# Patient Record
Sex: MALE | URBAN - NONMETROPOLITAN AREA
[De-identification: names, ages, dates, MRNs, and addresses within clinical notes are randomized per-mention and may not be internally consistent; named-entity substitution may affect disease eponyms.]

---

## 2017-05-03 ENCOUNTER — DOCTOR'S OFFICE (OUTPATIENT)
Dept: URBAN - NONMETROPOLITAN AREA CLINIC 1 | Facility: CLINIC | Age: 57
Setting detail: OPHTHALMOLOGY
End: 2017-05-03
Payer: COMMERCIAL

## 2017-05-03 DIAGNOSIS — H33.22: ICD-10-CM

## 2017-05-03 DIAGNOSIS — H27.8: ICD-10-CM

## 2017-05-03 DIAGNOSIS — D31.31: ICD-10-CM

## 2017-05-03 DIAGNOSIS — H33.8: ICD-10-CM

## 2017-05-03 DIAGNOSIS — T85.22XA: ICD-10-CM

## 2017-05-03 PROBLEM — H11.32 SUBCONJUNCTIVAL HEMORRHAGE: Status: RESOLVED | Noted: 2017-05-03 | Resolved: 2017-05-03

## 2017-05-03 PROCEDURE — 92014 COMPRE OPH EXAM EST PT 1/>: CPT | Performed by: OPHTHALMOLOGY

## 2017-05-03 PROCEDURE — 92226 OPHTHALMOSCOPY EXT SUBSEQUENT: CPT | Performed by: OPHTHALMOLOGY

## 2017-05-03 ASSESSMENT — VISUAL ACUITY
OS_BCVA: 20/CF @FACE
OD_BCVA: 20/40-2

## 2017-05-03 ASSESSMENT — REFRACTION_CURRENTRX
OD_OVR_VA: 20/
OS_OVR_VA: 20/
OD_OVR_VA: 20/
OD_OVR_VA: 20/
OS_OVR_VA: 20/
OS_OVR_VA: 20/

## 2017-05-03 ASSESSMENT — REFRACTION_MANIFEST
OU_VA: 20/
OS_VA2: 20/
OD_VA1: 20/
OD_VA2: 20/
OD_VA1: 20/
OU_VA: 20/
OS_VA3: 20/
OS_VA2: 20/
OD_VA2: 20/
OD_VA3: 20/
OS_VA1: 20/
OS_VA3: 20/
OS_VA2: 20/
OD_VA2: 20/
OU_VA: 20/
OS_VA3: 20/
OD_VA3: 20/
OD_VA3: 20/
OS_VA1: 20/
OD_VA1: 20/
OS_VA1: 20/

## 2017-05-03 ASSESSMENT — CORNEAL EDEMA CLINICAL DESCRIPTION: OD_CORNEALEDEMA: T

## 2017-05-03 ASSESSMENT — CONFRONTATIONAL VISUAL FIELD TEST (CVF)
OS_FINDINGS: FULL
OD_FINDINGS: FULL

## 2017-05-09 ENCOUNTER — RX ONLY (RX ONLY)
Age: 57
End: 2017-05-09

## 2017-05-09 ENCOUNTER — DOCTOR'S OFFICE (OUTPATIENT)
Dept: URBAN - NONMETROPOLITAN AREA CLINIC 1 | Facility: CLINIC | Age: 57
Setting detail: OPHTHALMOLOGY
End: 2017-05-09
Payer: COMMERCIAL

## 2017-05-09 DIAGNOSIS — H27.8: ICD-10-CM

## 2017-05-09 DIAGNOSIS — T85.22XD: ICD-10-CM

## 2017-05-09 DIAGNOSIS — H43.811: ICD-10-CM

## 2017-05-09 DIAGNOSIS — H33.8: ICD-10-CM

## 2017-05-09 DIAGNOSIS — D31.31: ICD-10-CM

## 2017-05-09 PROCEDURE — 92226 OPHTHALMOSCOPY EXT SUBSEQUENT: CPT | Performed by: OPHTHALMOLOGY

## 2017-05-09 PROCEDURE — 92014 COMPRE OPH EXAM EST PT 1/>: CPT | Performed by: OPHTHALMOLOGY

## 2017-05-09 ASSESSMENT — REFRACTION_MANIFEST
OS_VA2: 20/
OS_VA1: 20/
OS_VA1: 20/
OD_VA1: 20/
OU_VA: 20/
OD_VA2: 20/
OS_VA2: 20/
OS_VA3: 20/
OD_VA3: 20/
OD_VA2: 20/
OS_VA3: 20/
OD_VA1: 20/
OD_VA1: 20/
OU_VA: 20/
OS_VA2: 20/
OD_VA2: 20/
OS_VA3: 20/
OD_VA3: 20/
OD_VA3: 20/
OS_VA1: 20/
OU_VA: 20/

## 2017-05-09 ASSESSMENT — REFRACTION_CURRENTRX
OS_OVR_VA: 20/
OD_OVR_VA: 20/

## 2017-05-09 ASSESSMENT — CONFRONTATIONAL VISUAL FIELD TEST (CVF)
OD_FINDINGS: FULL
OS_FINDINGS: FULL

## 2017-05-09 ASSESSMENT — VISUAL ACUITY: OD_BCVA: 20/40-2

## 2017-05-09 ASSESSMENT — CORNEAL EDEMA CLINICAL DESCRIPTION: OD_CORNEALEDEMA: T

## 2017-05-10 ENCOUNTER — TRANSCRIBE ORDERS (OUTPATIENT)
Dept: ADMINISTRATIVE | Facility: HOSPITAL | Age: 57
End: 2017-05-10

## 2017-05-10 ENCOUNTER — LAB (OUTPATIENT)
Dept: LAB | Facility: HOSPITAL | Age: 57
End: 2017-05-10
Payer: COMMERCIAL

## 2017-05-10 DIAGNOSIS — I10 ESSENTIAL HYPERTENSION, MALIGNANT: Primary | ICD-10-CM

## 2017-05-10 DIAGNOSIS — I10 ESSENTIAL HYPERTENSION, MALIGNANT: ICD-10-CM

## 2017-05-10 PROCEDURE — 93005 ELECTROCARDIOGRAM TRACING: CPT

## 2017-05-11 ENCOUNTER — OUTPATIENT HOSPITAL (OUTPATIENT)
Dept: URBAN - METROPOLITAN AREA CLINIC 130 | Facility: CLINIC | Age: 57
Setting detail: OPHTHALMOLOGY
End: 2017-05-11
Payer: COMMERCIAL

## 2017-05-11 DIAGNOSIS — H33.8: ICD-10-CM

## 2017-05-11 DIAGNOSIS — H43.811: ICD-10-CM

## 2017-05-11 DIAGNOSIS — T85.22XD: ICD-10-CM

## 2017-05-11 LAB
ATRIAL RATE: 105 BPM
P AXIS: 60 DEGREES
PR INTERVAL: 138 MS
QRS AXIS: 49 DEGREES
QRSD INTERVAL: 84 MS
QT INTERVAL: 346 MS
QTC INTERVAL: 457 MS
T WAVE AXIS: 69 DEGREES
VENTRICULAR RATE: 105 BPM

## 2017-05-11 PROCEDURE — 67108 REPAIR DETACHED RETINA: CPT | Performed by: OPHTHALMOLOGY

## 2017-05-11 PROCEDURE — 66825 REPOSITION INTRAOCULAR LENS: CPT | Performed by: OPHTHALMOLOGY

## 2017-05-12 ENCOUNTER — RX ONLY (RX ONLY)
Age: 57
End: 2017-05-12

## 2017-05-12 ENCOUNTER — DOCTOR'S OFFICE (OUTPATIENT)
Dept: URBAN - NONMETROPOLITAN AREA CLINIC 1 | Facility: CLINIC | Age: 57
Setting detail: OPHTHALMOLOGY
End: 2017-05-12
Payer: COMMERCIAL

## 2017-05-12 DIAGNOSIS — T85.22XD: ICD-10-CM

## 2017-05-12 PROCEDURE — 99024 POSTOP FOLLOW-UP VISIT: CPT | Performed by: OPTOMETRIST

## 2017-05-12 ASSESSMENT — VISUAL ACUITY
OS_BCVA: CF 4FT
OD_BCVA: 20/40-2

## 2017-05-12 ASSESSMENT — CORNEAL EDEMA CLINICAL DESCRIPTION: OD_CORNEALEDEMA: 2+

## 2017-05-12 ASSESSMENT — REFRACTION_CURRENTRX
OD_OVR_VA: 20/
OD_OVR_VA: 20/
OS_OVR_VA: 20/
OS_OVR_VA: 20/
OD_OVR_VA: 20/
OS_OVR_VA: 20/

## 2017-05-12 ASSESSMENT — REFRACTION_MANIFEST
OS_VA2: 20/
OU_VA: 20/
OS_VA2: 20/
OS_VA3: 20/
OS_VA1: 20/
OU_VA: 20/
OD_VA2: 20/
OS_VA3: 20/
OD_VA3: 20/
OD_VA3: 20/
OD_VA1: 20/
OS_VA1: 20/
OS_VA1: 20/
OD_VA2: 20/
OS_VA3: 20/
OD_VA3: 20/
OU_VA: 20/
OD_VA1: 20/
OD_VA1: 20/
OD_VA2: 20/
OS_VA2: 20/

## 2017-05-12 ASSESSMENT — VASCULARIZATION: OD_VASCULARIZATION: INFERIOR

## 2017-05-23 ENCOUNTER — DOCTOR'S OFFICE (OUTPATIENT)
Dept: URBAN - NONMETROPOLITAN AREA CLINIC 1 | Facility: CLINIC | Age: 57
Setting detail: OPHTHALMOLOGY
End: 2017-05-23
Payer: COMMERCIAL

## 2017-05-23 DIAGNOSIS — T85.22XS: ICD-10-CM

## 2017-05-23 DIAGNOSIS — H33.001: ICD-10-CM

## 2017-05-23 PROCEDURE — 99024 POSTOP FOLLOW-UP VISIT: CPT | Performed by: OPHTHALMOLOGY

## 2017-05-23 PROCEDURE — 92226 OPHTHALMOSCOPY EXT SUBSEQUENT: CPT | Performed by: OPHTHALMOLOGY

## 2017-05-23 ASSESSMENT — REFRACTION_MANIFEST
OD_VA1: 20/
OD_VA1: 20/
OD_VA2: 20/
OD_VA2: 20/
OD_VA3: 20/
OS_VA1: 20/
OS_VA2: 20/
OU_VA: 20/
OS_VA3: 20/
OD_VA3: 20/
OU_VA: 20/
OS_VA2: 20/
OS_VA1: 20/
OS_VA2: 20/
OD_VA3: 20/
OD_VA1: 20/
OS_VA3: 20/
OD_VA2: 20/
OS_VA3: 20/
OU_VA: 20/
OS_VA1: 20/

## 2017-05-23 ASSESSMENT — REFRACTION_CURRENTRX
OD_OVR_VA: 20/
OS_OVR_VA: 20/
OS_OVR_VA: 20/
OD_OVR_VA: 20/
OD_OVR_VA: 20/
OS_OVR_VA: 20/

## 2017-05-23 ASSESSMENT — CORNEAL EDEMA CLINICAL DESCRIPTION: OD_CORNEALEDEMA: 2+

## 2017-05-23 ASSESSMENT — CONFRONTATIONAL VISUAL FIELD TEST (CVF)
OS_FINDINGS: FULL
OD_FINDINGS: FULL

## 2017-05-23 ASSESSMENT — VASCULARIZATION: OD_VASCULARIZATION: INFERIOR

## 2017-05-23 ASSESSMENT — VISUAL ACUITY
OD_BCVA: 20/40-2
OS_BCVA: CF 4FT

## 2017-06-14 ENCOUNTER — DOCTOR'S OFFICE (OUTPATIENT)
Dept: URBAN - NONMETROPOLITAN AREA CLINIC 1 | Facility: CLINIC | Age: 57
Setting detail: OPHTHALMOLOGY
End: 2017-06-14
Payer: COMMERCIAL

## 2017-06-14 DIAGNOSIS — T85.22XS: ICD-10-CM

## 2017-06-14 DIAGNOSIS — H33.001: ICD-10-CM

## 2017-06-14 PROCEDURE — 99024 POSTOP FOLLOW-UP VISIT: CPT | Performed by: OPHTHALMOLOGY

## 2017-06-14 ASSESSMENT — REFRACTION_MANIFEST
OU_VA: 20/
OS_VA2: 20/
OD_VA1: 20/
OU_VA: 20/
OD_VA2: 20/
OS_VA1: 20/
OS_VA2: 20/
OD_VA1: 20/
OS_VA2: 20/
OU_VA: 20/
OD_VA3: 20/
OS_VA1: 20/
OD_VA1: 20/
OD_VA2: 20/
OS_VA1: 20/
OD_VA3: 20/
OS_VA3: 20/
OD_VA3: 20/
OD_VA2: 20/
OS_VA3: 20/
OS_VA3: 20/

## 2017-06-14 ASSESSMENT — VISUAL ACUITY
OS_BCVA: 20/150-1
OD_BCVA: 20/25

## 2017-06-14 ASSESSMENT — REFRACTION_CURRENTRX
OS_OVR_VA: 20/
OS_OVR_VA: 20/
OD_OVR_VA: 20/
OD_OVR_VA: 20/
OS_OVR_VA: 20/
OD_OVR_VA: 20/

## 2017-06-14 ASSESSMENT — VASCULARIZATION: OD_VASCULARIZATION: INFERIOR

## 2017-06-14 ASSESSMENT — CONFRONTATIONAL VISUAL FIELD TEST (CVF)
OS_FINDINGS: FULL
OD_FINDINGS: FULL

## 2017-06-14 ASSESSMENT — CORNEAL EDEMA CLINICAL DESCRIPTION: OD_CORNEALEDEMA: 2+

## 2017-07-05 ENCOUNTER — DOCTOR'S OFFICE (OUTPATIENT)
Dept: URBAN - NONMETROPOLITAN AREA CLINIC 1 | Facility: CLINIC | Age: 57
Setting detail: OPHTHALMOLOGY
End: 2017-07-05
Payer: COMMERCIAL

## 2017-07-05 DIAGNOSIS — T85.22XS: ICD-10-CM

## 2017-07-05 DIAGNOSIS — H33.001: ICD-10-CM

## 2017-07-05 PROCEDURE — 99024 POSTOP FOLLOW-UP VISIT: CPT | Performed by: OPHTHALMOLOGY

## 2017-07-05 ASSESSMENT — CONFRONTATIONAL VISUAL FIELD TEST (CVF)
OS_FINDINGS: FULL
OD_FINDINGS: FULL

## 2017-07-05 ASSESSMENT — REFRACTION_CURRENTRX
OD_OVR_VA: 20/
OD_OVR_VA: 20/
OS_OVR_VA: 20/
OD_OVR_VA: 20/

## 2017-07-05 ASSESSMENT — REFRACTION_MANIFEST
OS_VA3: 20/
OS_VA3: 20/
OD_VA1: 20/
OD_VA3: 20/
OU_VA: 20/
OD_VA2: 20/
OS_VA2: 20/
OS_VA1: 20/
OD_VA2: 20/
OD_VA1: 20/
OS_VA2: 20/
OD_VA2: 20/
OS_VA2: 20/
OS_VA3: 20/
OS_VA1: 20/
OD_VA1: 20/
OD_VA3: 20/
OU_VA: 20/
OS_VA1: 20/
OD_VA3: 20/
OU_VA: 20/

## 2017-07-05 ASSESSMENT — CORNEAL EDEMA CLINICAL DESCRIPTION: OD_CORNEALEDEMA: ABSENT

## 2017-07-05 ASSESSMENT — VISUAL ACUITY
OD_BCVA: 20/30
OS_BCVA: 20/80-1

## 2017-07-05 ASSESSMENT — VASCULARIZATION: OD_VASCULARIZATION: INFERIOR

## 2017-07-12 ENCOUNTER — DOCTOR'S OFFICE (OUTPATIENT)
Dept: URBAN - NONMETROPOLITAN AREA CLINIC 1 | Facility: CLINIC | Age: 57
Setting detail: OPHTHALMOLOGY
End: 2017-07-12
Payer: COMMERCIAL

## 2017-07-12 DIAGNOSIS — H33.001: ICD-10-CM

## 2017-07-12 DIAGNOSIS — T85.22XS: ICD-10-CM

## 2017-07-12 DIAGNOSIS — Z96.1: ICD-10-CM

## 2017-07-12 DIAGNOSIS — D31.31: ICD-10-CM

## 2017-07-12 PROCEDURE — 99024 POSTOP FOLLOW-UP VISIT: CPT | Performed by: OPHTHALMOLOGY

## 2017-07-12 ASSESSMENT — REFRACTION_MANIFEST
OD_VA3: 20/
OD_VA1: 20/
OU_VA: 20/
OD_VA3: 20/
OD_VA2: 20/
OS_VA1: 20/
OS_VA3: 20/
OU_VA: 20/
OS_VA2: 20/
OS_VA1: 20/
OD_VA2: 20/
OS_VA3: 20/
OD_VA3: 20/
OS_VA2: 20/
OD_VA1: 20/
OS_VA1: 20/
OU_VA: 20/
OS_VA3: 20/
OD_VA2: 20/
OS_VA2: 20/
OD_VA1: 20/

## 2017-07-12 ASSESSMENT — REFRACTION_CURRENTRX
OD_OVR_VA: 20/
OS_OVR_VA: 20/

## 2017-07-12 ASSESSMENT — VISUAL ACUITY
OD_BCVA: 20/30
OS_BCVA: 20/80-1

## 2017-07-12 ASSESSMENT — CONFRONTATIONAL VISUAL FIELD TEST (CVF)
OS_FINDINGS: FULL
OD_FINDINGS: FULL

## 2017-07-12 ASSESSMENT — VASCULARIZATION: OD_VASCULARIZATION: INFERIOR

## 2017-07-12 ASSESSMENT — CORNEAL EDEMA CLINICAL DESCRIPTION: OD_CORNEALEDEMA: ABSENT

## 2018-05-18 ENCOUNTER — DOCTOR'S OFFICE (OUTPATIENT)
Dept: URBAN - NONMETROPOLITAN AREA CLINIC 1 | Facility: CLINIC | Age: 58
Setting detail: OPHTHALMOLOGY
End: 2018-05-18
Payer: COMMERCIAL

## 2018-05-18 DIAGNOSIS — H33.001: ICD-10-CM

## 2018-05-18 DIAGNOSIS — H53.122: ICD-10-CM

## 2018-05-18 DIAGNOSIS — D31.31: ICD-10-CM

## 2018-05-18 DIAGNOSIS — H33.22: ICD-10-CM

## 2018-05-18 PROCEDURE — 92226 OPHTHALMOSCOPY EXT SUBSEQUENT: CPT | Performed by: OPHTHALMOLOGY

## 2018-05-18 PROCEDURE — 92235 FLUORESCEIN ANGRPH MLTIFRAME: CPT | Performed by: OPHTHALMOLOGY

## 2018-05-18 PROCEDURE — 92134 CPTRZ OPH DX IMG PST SGM RTA: CPT | Performed by: OPHTHALMOLOGY

## 2018-05-18 PROCEDURE — 92014 COMPRE OPH EXAM EST PT 1/>: CPT | Performed by: OPHTHALMOLOGY

## 2018-05-18 PROCEDURE — 92250 FUNDUS PHOTOGRAPHY W/I&R: CPT | Performed by: OPHTHALMOLOGY

## 2018-05-18 ASSESSMENT — REFRACTION_MANIFEST
OU_VA: 20/
OD_VA3: 20/
OD_VA2: 20/
OS_VA1: 20/
OD_VA1: 20/
OU_VA: 20/
OD_VA3: 20/
OS_VA2: 20/
OD_VA3: 20/
OS_VA3: 20/
OS_VA3: 20/
OD_VA2: 20/
OU_VA: 20/
OD_VA1: 20/
OS_VA2: 20/
OS_VA1: 20/
OD_VA1: 20/
OD_VA2: 20/
OS_VA3: 20/
OS_VA1: 20/
OS_VA2: 20/

## 2018-05-18 ASSESSMENT — REFRACTION_CURRENTRX
OS_OVR_VA: 20/
OS_OVR_VA: 20/
OD_OVR_VA: 20/
OS_OVR_VA: 20/

## 2018-05-18 ASSESSMENT — VASCULARIZATION: OD_VASCULARIZATION: INFERIOR

## 2018-05-18 ASSESSMENT — CONFRONTATIONAL VISUAL FIELD TEST (CVF)
OD_FINDINGS: FULL
OS_FINDINGS: FULL

## 2018-05-18 ASSESSMENT — VISUAL ACUITY
OS_BCVA: 20/70
OD_BCVA: CF 3FT

## 2018-05-18 ASSESSMENT — CORNEAL EDEMA CLINICAL DESCRIPTION: OD_CORNEALEDEMA: ABSENT

## 2018-05-21 ENCOUNTER — RX ONLY (RX ONLY)
Age: 58
End: 2018-05-21

## 2018-05-21 ENCOUNTER — DOCTOR'S OFFICE (OUTPATIENT)
Dept: URBAN - NONMETROPOLITAN AREA CLINIC 1 | Facility: CLINIC | Age: 58
Setting detail: OPHTHALMOLOGY
End: 2018-05-21
Payer: COMMERCIAL

## 2018-05-21 DIAGNOSIS — H33.22: ICD-10-CM

## 2018-05-21 DIAGNOSIS — H53.122: ICD-10-CM

## 2018-05-21 DIAGNOSIS — H40.052: ICD-10-CM

## 2018-05-21 DIAGNOSIS — T85.22XS: ICD-10-CM

## 2018-05-21 DIAGNOSIS — D31.31: ICD-10-CM

## 2018-05-21 DIAGNOSIS — H33.001: ICD-10-CM

## 2018-05-21 DIAGNOSIS — H27.132: ICD-10-CM

## 2018-05-21 PROCEDURE — 92226 OPHTHALMOSCOPY EXT SUBSEQUENT: CPT | Performed by: OPHTHALMOLOGY

## 2018-05-21 PROCEDURE — 92014 COMPRE OPH EXAM EST PT 1/>: CPT | Performed by: OPHTHALMOLOGY

## 2018-05-21 PROCEDURE — 92134 CPTRZ OPH DX IMG PST SGM RTA: CPT | Performed by: OPHTHALMOLOGY

## 2018-05-21 ASSESSMENT — REFRACTION_MANIFEST
OS_VA3: 20/
OD_VA1: 20/
OS_VA3: 20/
OD_VA2: 20/
OS_VA2: 20/
OD_VA3: 20/
OD_VA2: 20/
OS_VA1: 20/
OS_VA1: 20/
OU_VA: 20/
OS_VA3: 20/
OU_VA: 20/
OS_VA2: 20/
OU_VA: 20/
OD_VA3: 20/
OS_VA1: 20/
OS_VA2: 20/
OD_VA3: 20/
OD_VA1: 20/
OD_VA1: 20/
OD_VA2: 20/

## 2018-05-21 ASSESSMENT — CORNEAL EDEMA CLINICAL DESCRIPTION: OD_CORNEALEDEMA: ABSENT

## 2018-05-21 ASSESSMENT — VASCULARIZATION: OD_VASCULARIZATION: INFERIOR

## 2018-05-21 ASSESSMENT — VISUAL ACUITY
OS_BCVA: 20/70-2
OD_BCVA: 20/CF X 3'

## 2018-05-21 ASSESSMENT — REFRACTION_CURRENTRX
OD_OVR_VA: 20/
OD_OVR_VA: 20/
OS_OVR_VA: 20/
OD_OVR_VA: 20/
OS_OVR_VA: 20/
OS_OVR_VA: 20/

## 2018-05-21 ASSESSMENT — CONFRONTATIONAL VISUAL FIELD TEST (CVF)
OD_FINDINGS: FULL
OS_FINDINGS: FULL

## 2018-06-07 ENCOUNTER — DOCTOR'S OFFICE (OUTPATIENT)
Dept: URBAN - NONMETROPOLITAN AREA CLINIC 1 | Facility: CLINIC | Age: 58
Setting detail: OPHTHALMOLOGY
End: 2018-06-07
Payer: COMMERCIAL

## 2018-06-07 ENCOUNTER — RX ONLY (RX ONLY)
Age: 58
End: 2018-06-07

## 2018-06-07 DIAGNOSIS — H40.052: ICD-10-CM

## 2018-06-07 DIAGNOSIS — H27.132: ICD-10-CM

## 2018-06-07 PROBLEM — H33.001: Status: ACTIVE | Noted: 2017-05-23

## 2018-06-07 PROBLEM — D31.31 BENIGN NEOPLASM OF RIGHT CHOROID: Status: ACTIVE | Noted: 2017-05-03

## 2018-06-07 PROBLEM — H43.811 POSTERIOR VITREOUS DETACHMENT; RIGHT EYE: Status: ACTIVE | Noted: 2017-05-09

## 2018-06-07 PROBLEM — T85.22XS: Status: ACTIVE | Noted: 2017-05-23

## 2018-06-07 PROBLEM — H33.22: Status: ACTIVE | Noted: 2017-05-03

## 2018-06-07 PROBLEM — Z96.1 PSEUDOPHAKIA: Status: ACTIVE | Noted: 2017-07-12

## 2018-06-07 PROBLEM — H53.122: Status: ACTIVE | Noted: 2018-05-18

## 2018-06-07 PROCEDURE — 92014 COMPRE OPH EXAM EST PT 1/>: CPT | Performed by: OPHTHALMOLOGY

## 2018-06-07 ASSESSMENT — VISUAL ACUITY
OD_BCVA: CFX2'
OS_BCVA: 20/70+1

## 2018-06-07 ASSESSMENT — REFRACTION_CURRENTRX
OD_OVR_VA: 20/
OD_OVR_VA: 20/
OS_OVR_VA: 20/
OS_OVR_VA: 20/
OD_OVR_VA: 20/
OS_OVR_VA: 20/

## 2018-06-07 ASSESSMENT — REFRACTION_MANIFEST
OU_VA: 20/
OS_VA3: 20/
OS_VA2: 20/
OS_VA1: 20/
OS_VA1: 20/
OD_VA3: 20/
OS_VA2: 20/
OS_VA2: 20/
OD_VA3: 20/
OS_VA1: 20/
OD_VA2: 20/
OD_VA3: 20/
OS_VA3: 20/
OD_VA1: 20/
OU_VA: 20/
OD_VA2: 20/
OD_VA1: 20/
OU_VA: 20/
OD_VA1: 20/
OD_VA2: 20/
OS_VA3: 20/

## 2018-06-07 ASSESSMENT — CONFRONTATIONAL VISUAL FIELD TEST (CVF)
OS_FINDINGS: FULL
OD_FINDINGS: FULL

## 2018-06-07 ASSESSMENT — VASCULARIZATION: OD_VASCULARIZATION: INFERIOR

## 2018-06-07 ASSESSMENT — CORNEAL EDEMA CLINICAL DESCRIPTION: OD_CORNEALEDEMA: ABSENT

## 2018-09-17 ENCOUNTER — HOSPITAL ENCOUNTER (EMERGENCY)
Facility: HOSPITAL | Age: 58
End: 2018-09-18
Admitting: EMERGENCY MEDICINE

## 2018-09-17 DIAGNOSIS — R45.851 DEPRESSION WITH SUICIDAL IDEATION: Primary | ICD-10-CM

## 2018-09-17 DIAGNOSIS — R73.9 HYPERGLYCEMIA: ICD-10-CM

## 2018-09-17 DIAGNOSIS — F32.A DEPRESSION WITH SUICIDAL IDEATION: Primary | ICD-10-CM

## 2018-09-17 LAB
AMPHETAMINES SERPL QL SCN: NEGATIVE
BARBITURATES UR QL: NEGATIVE
BENZODIAZ UR QL: NEGATIVE
COCAINE UR QL: NEGATIVE
ETHANOL SERPL-MCNC: <10 MG/DL
METHADONE UR QL: NEGATIVE
OPIATES UR QL SCN: NEGATIVE
PCP UR QL: NEGATIVE
THC UR QL: NEGATIVE

## 2018-09-17 PROCEDURE — 81003 URINALYSIS AUTO W/O SCOPE: CPT | Performed by: EMERGENCY MEDICINE

## 2018-09-17 PROCEDURE — 80307 DRUG TEST PRSMV CHEM ANLYZR: CPT

## 2018-09-17 PROCEDURE — 80320 DRUG SCREEN QUANTALCOHOLS: CPT

## 2018-09-17 PROCEDURE — 36415 COLL VENOUS BLD VENIPUNCTURE: CPT

## 2018-09-17 RX ORDER — LISINOPRIL AND HYDROCHLOROTHIAZIDE 12.5; 1 MG/1; MG/1
1 TABLET ORAL DAILY
Status: ON HOLD | COMMUNITY
End: 2018-09-25

## 2018-09-17 NOTE — ED PROVIDER NOTES
History  Chief Complaint   Patient presents with    Psychiatric Evaluation     patient states he is jobless and has legal issues  States he has suicidal ideation because "everything is falling apart" would overdose     Sanjay Miller is a 60-year-old male who was brought to the emergency department by ambulance crew due to depression with the suicidal ideation that has been going on for the last several days prior to arrival in the emergency department  Patient states that everything is falling apart  Patient has legal problems that has difficulty resolving recently  Patient further states that does have plans for suicide by taking a lot of pills  History provided by:  Patient and EMS personnel   used: No    Psychiatric Evaluation   Presenting symptoms: depression, suicidal thoughts and suicidal threats    Degree of incapacity (severity):  Severe  Onset quality:  Gradual  Duration: Several   Timing:  Constant  Progression:  Worsening  Chronicity:  Recurrent  Context: not alcohol use, not drug abuse, not medication, not noncompliant and not recent medication change    Treatment compliance:  Untreated  Relieved by:  Nothing  Worsened by:  Nothing  Ineffective treatments:  None tried  Associated symptoms: anxiety and poor judgment    Associated symptoms: no abdominal pain, no anhedonia, no appetite change, no chest pain, no decreased need for sleep, not distractible, no euphoric mood, no fatigue, no feelings of worthlessness, no headaches, no hypersomnia, no hyperventilation, no insomnia, no irritability and no weight change    Risk factors: hx of mental illness        Prior to Admission Medications   Prescriptions Last Dose Informant Patient Reported? Taking?    Cyclobenzaprine HCl (FLEXERIL PO)   Yes No   Sig: Take 1 tablet by mouth Pt does not know dose   lisinopril-hydrochlorothiazide (PRINZIDE,ZESTORETIC) 10-12 5 MG per tablet   Yes Yes   Sig: Take 1 tablet by mouth daily loratadine (CLARITIN) 10 mg tablet   Yes No   Sig: Take 10 mg by mouth daily      Facility-Administered Medications: None       Past Medical History:   Diagnosis Date    Hypertension        Past Surgical History:   Procedure Laterality Date    RETINAL DETACHMENT SURGERY      SHOULDER ARTHROSCOPY         History reviewed  No pertinent family history  I have reviewed and agree with the history as documented  Social History   Substance Use Topics    Smoking status: Never Smoker    Smokeless tobacco: Never Used    Alcohol use Yes      Comment: occassional        Review of Systems   Constitutional: Negative for appetite change, fatigue and irritability  HENT: Negative  Eyes: Negative  Respiratory: Negative  Cardiovascular: Negative for chest pain  Gastrointestinal: Negative for abdominal pain  Endocrine: Negative  Genitourinary: Negative  Musculoskeletal: Negative  Skin: Negative  Allergic/Immunologic: Negative  Neurological: Negative for headaches  Hematological: Negative  Psychiatric/Behavioral: Positive for suicidal ideas  The patient is nervous/anxious  The patient does not have insomnia  Physical Exam  Physical Exam   Constitutional: He is oriented to person, place, and time  He appears well-developed and well-nourished  No distress  HENT:   Head: Normocephalic and atraumatic  Right Ear: External ear normal    Left Ear: External ear normal    Nose: Nose normal    Mouth/Throat: Oropharynx is clear and moist  No oropharyngeal exudate  Eyes: Conjunctivae and EOM are normal  Pupils are equal, round, and reactive to light  Right eye exhibits no discharge  Left eye exhibits no discharge  No scleral icterus  Neck: Normal range of motion  Neck supple  No tracheal deviation present  No thyromegaly present  Cardiovascular: Normal rate, regular rhythm, normal heart sounds and intact distal pulses      Pulmonary/Chest: Effort normal and breath sounds normal  Abdominal: Soft  Bowel sounds are normal  He exhibits no distension  There is no tenderness  Musculoskeletal: Normal range of motion  He exhibits no edema, tenderness or deformity  Lymphadenopathy:     He has no cervical adenopathy  Neurological: He is alert and oriented to person, place, and time  No cranial nerve deficit or sensory deficit  He exhibits normal muscle tone  Coordination normal    Skin: Skin is warm  No rash noted  He is not diaphoretic  No erythema  No pallor  Psychiatric: His speech is normal and behavior is normal  Cognition and memory are normal  He expresses impulsivity  He exhibits a depressed mood  He expresses suicidal ideation  He expresses suicidal plans  Nursing note and vitals reviewed  Vital Signs  ED Triage Vitals [09/17/18 1658]   Temperature Pulse Respirations Blood Pressure SpO2   98 4 °F (36 9 °C) 81 16 145/72 96 %      Temp Source Heart Rate Source Patient Position - Orthostatic VS BP Location FiO2 (%)   Temporal Monitor Sitting Left arm --      Pain Score       No Pain           Vitals:    09/17/18 1658   BP: 145/72   Pulse: 81   Patient Position - Orthostatic VS: Sitting       Visual Acuity      ED Medications  Medications - No data to display    Diagnostic Studies  Results Reviewed     Procedure Component Value Units Date/Time    Rapid drug screen, urine [[id number]]  (Normal) Collected:  09/17/18 1740    Lab Status:  Final result Specimen:  Urine from Urine, Clean Catch Updated:  09/17/18 1814     Amph/Meth UR Negative     Barbiturate Ur Negative     Benzodiazepine Urine Negative     Cocaine Urine Negative     Methadone Urine Negative     Opiate Urine Negative     PCP Ur Negative     THC Urine Negative    Narrative:         FOR MEDICAL PURPOSES ONLY  IF CONFIRMATION NEEDED PLEASE CONTACT THE LAB WITHIN 5 DAYS      Drug Screen Cutoff Levels:  AMPHETAMINE/METHAMPHETAMINES  1000 ng/mL  BARBITURATES     200 ng/mL  BENZODIAZEPINES     200 ng/mL  COCAINE      300 ng/mL  METHADONE      300 ng/mL  OPIATES      300 ng/mL  PHENCYCLIDINE     25 ng/mL  THC       50 ng/mL    Ethanol [[id number]]  (Normal) Collected:  09/17/18 1740    Lab Status:  Final result Specimen:  Blood from Arm, Right Updated:  09/17/18 1807     Ethanol Lvl <10 mg/dL                  No orders to display              Procedures  Procedures       Phone Contacts  ED Phone Contact    ED Course  ED Course as of Sep 17 1907   Mon Sep 17, 2018   1818 Medically cleared for Mili Lara evaluation    1906 ED care was transferred to Dr Gordy Obrien Time    Disposition  Final diagnoses:   Depression with suicidal ideation     Time reflects when diagnosis was documented in both MDM as applicable and the Disposition within this note     Time User Action Codes Description Comment    9/17/2018  4:58 PM JEAN-PAUL Jhaveri 65 [F32 9,  H84 [id number]] Depression with suicidal ideation       ED Disposition     None      Follow-up Information    None         Patient's Medications   Discharge Prescriptions    No medications on file     No discharge procedures on file      ED Provider  Electronically Signed by           Lucy Bingham MD  09/17/18 6973

## 2018-09-18 ENCOUNTER — HOSPITAL ENCOUNTER (INPATIENT)
Facility: HOSPITAL | Age: 58
LOS: 13 days | Discharge: HOME | DRG: 885 | End: 2018-10-01
Attending: PSYCHIATRY & NEUROLOGY | Admitting: PSYCHIATRY & NEUROLOGY
Payer: OTHER GOVERNMENT

## 2018-09-18 ENCOUNTER — APPOINTMENT (EMERGENCY)
Dept: RADIOLOGY | Facility: HOSPITAL | Age: 58
End: 2018-09-18

## 2018-09-18 VITALS
OXYGEN SATURATION: 98 % | HEIGHT: 66 IN | BODY MASS INDEX: 27.32 KG/M2 | WEIGHT: 170 LBS | TEMPERATURE: 98.2 F | RESPIRATION RATE: 21 BRPM | DIASTOLIC BLOOD PRESSURE: 95 MMHG | SYSTOLIC BLOOD PRESSURE: 169 MMHG | HEART RATE: 66 BPM

## 2018-09-18 DIAGNOSIS — I10 HYPERTENSION: Primary | ICD-10-CM

## 2018-09-18 DIAGNOSIS — E53.8 B12 DEFICIENCY: ICD-10-CM

## 2018-09-18 DIAGNOSIS — R73.9 HYPERGLYCEMIA: ICD-10-CM

## 2018-09-18 DIAGNOSIS — M19.90 DJD (DEGENERATIVE JOINT DISEASE): ICD-10-CM

## 2018-09-18 DIAGNOSIS — F32.2 CURRENT SEVERE EPISODE OF MAJOR DEPRESSIVE DISORDER WITHOUT PSYCHOTIC FEATURES WITHOUT PRIOR EPISODE (HCC): ICD-10-CM

## 2018-09-18 DIAGNOSIS — E55.9 VITAMIN D DEFICIENCY: ICD-10-CM

## 2018-09-18 DIAGNOSIS — E11.9 DIABETES (HCC): ICD-10-CM

## 2018-09-18 LAB
ALBUMIN SERPL BCP-MCNC: 3.7 G/DL (ref 3.5–5.7)
ALP SERPL-CCNC: 58 U/L (ref 40–150)
ALT SERPL W P-5'-P-CCNC: 22 U/L (ref 7–52)
ANION GAP SERPL CALCULATED.3IONS-SCNC: 7 MMOL/L (ref 4–13)
AST SERPL W P-5'-P-CCNC: 16 U/L (ref 13–39)
ATRIAL RATE: 71 BPM
BASOPHILS # BLD AUTO: 0 THOUSANDS/ΜL (ref 0–0.1)
BASOPHILS NFR BLD AUTO: 1 % (ref 0–2)
BILIRUB SERPL-MCNC: 0.7 MG/DL (ref 0.2–1)
BILIRUB UR QL STRIP: NEGATIVE
BUN SERPL-MCNC: 13 MG/DL (ref 7–25)
CALCIUM SERPL-MCNC: 9.5 MG/DL (ref 8.6–10.5)
CHLORIDE SERPL-SCNC: 96 MMOL/L (ref 98–107)
CLARITY UR: CLEAR
CO2 SERPL-SCNC: 31 MMOL/L (ref 21–31)
COLOR UR: YELLOW
CREAT SERPL-MCNC: 0.94 MG/DL (ref 0.7–1.3)
EOSINOPHIL # BLD AUTO: 0.1 THOUSAND/ΜL (ref 0–0.61)
EOSINOPHIL NFR BLD AUTO: 3 % (ref 0–5)
ERYTHROCYTE [DISTWIDTH] IN BLOOD BY AUTOMATED COUNT: 13.4 % (ref 11.5–14.5)
GFR SERPL CREATININE-BSD FRML MDRD: 90 ML/MIN/1.73SQ M
GLUCOSE SERPL-MCNC: 148 MG/DL
GLUCOSE SERPL-MCNC: 258 MG/DL (ref 65–140)
GLUCOSE SERPL-MCNC: 345 MG/DL (ref 65–99)
GLUCOSE UR STRIP-MCNC: ABNORMAL MG/DL
HCT VFR BLD AUTO: 41.3 % (ref 36.5–49.3)
HGB BLD-MCNC: 14.6 G/DL (ref 14–18)
HGB UR QL STRIP.AUTO: NEGATIVE
KETONES UR STRIP-MCNC: NEGATIVE MG/DL
LEUKOCYTE ESTERASE UR QL STRIP: NEGATIVE
LYMPHOCYTES # BLD AUTO: 1.2 THOUSANDS/ΜL (ref 0.6–4.47)
LYMPHOCYTES NFR BLD AUTO: 31 % (ref 21–51)
MCH RBC QN AUTO: 32.7 PG (ref 26–34)
MCHC RBC AUTO-ENTMCNC: 35.2 G/DL (ref 31–37)
MCV RBC AUTO: 93 FL (ref 81–99)
MONOCYTES # BLD AUTO: 0.2 THOUSAND/ΜL (ref 0.17–1.22)
MONOCYTES NFR BLD AUTO: 6 % (ref 2–12)
NEUTROPHILS # BLD AUTO: 2.3 THOUSANDS/ΜL (ref 1.4–6.5)
NEUTS SEG NFR BLD AUTO: 60 % (ref 42–75)
NITRITE UR QL STRIP: NEGATIVE
NRBC BLD AUTO-RTO: 0 /100 WBCS
P AXIS: 69 DEGREES
PH UR STRIP.AUTO: 6 [PH] (ref 5–8)
PLATELET # BLD AUTO: 263 THOUSANDS/UL (ref 149–390)
PMV BLD AUTO: 7.2 FL (ref 8.6–11.7)
POTASSIUM SERPL-SCNC: 3.8 MMOL/L (ref 3.5–5.5)
PR INTERVAL: 130 MS
PROT SERPL-MCNC: 6.4 G/DL (ref 6.4–8.9)
PROT UR STRIP-MCNC: NEGATIVE MG/DL
QRS AXIS: 50 DEGREES
QRSD INTERVAL: 80 MS
QT INTERVAL: 402 MS
QTC INTERVAL: 436 MS
RBC # BLD AUTO: 4.45 MILLION/UL (ref 4.3–5.9)
SODIUM SERPL-SCNC: 134 MMOL/L (ref 134–143)
SP GR UR STRIP.AUTO: 1.01 (ref 1–1.03)
T WAVE AXIS: 57 DEGREES
TSH SERPL DL<=0.05 MIU/L-ACNC: 2.38 UIU/ML (ref 0.45–5.33)
UROBILINOGEN UR QL STRIP.AUTO: 0.2 E.U./DL
VENTRICULAR RATE: 71 BPM
WBC # BLD AUTO: 3.9 THOUSAND/UL (ref 4.8–10.8)

## 2018-09-18 PROCEDURE — 83036 HEMOGLOBIN GLYCOSYLATED A1C: CPT | Performed by: FAMILY MEDICINE

## 2018-09-18 PROCEDURE — 84443 ASSAY THYROID STIM HORMONE: CPT | Performed by: EMERGENCY MEDICINE

## 2018-09-18 PROCEDURE — 96372 THER/PROPH/DIAG INJ SC/IM: CPT

## 2018-09-18 PROCEDURE — 80053 COMPREHEN METABOLIC PANEL: CPT | Performed by: EMERGENCY MEDICINE

## 2018-09-18 PROCEDURE — 82607 VITAMIN B-12: CPT | Performed by: FAMILY MEDICINE

## 2018-09-18 PROCEDURE — 82306 VITAMIN D 25 HYDROXY: CPT | Performed by: FAMILY MEDICINE

## 2018-09-18 PROCEDURE — 36415 COLL VENOUS BLD VENIPUNCTURE: CPT | Performed by: EMERGENCY MEDICINE

## 2018-09-18 PROCEDURE — 82948 REAGENT STRIP/BLOOD GLUCOSE: CPT

## 2018-09-18 PROCEDURE — 93005 ELECTROCARDIOGRAM TRACING: CPT

## 2018-09-18 PROCEDURE — 99285 EMERGENCY DEPT VISIT HI MDM: CPT

## 2018-09-18 PROCEDURE — 71045 X-RAY EXAM CHEST 1 VIEW: CPT

## 2018-09-18 PROCEDURE — 85025 COMPLETE CBC W/AUTO DIFF WBC: CPT | Performed by: EMERGENCY MEDICINE

## 2018-09-18 RX ORDER — LORAZEPAM 0.5 MG/1
0.5 TABLET ORAL EVERY 4 HOURS PRN
Status: DISCONTINUED | OUTPATIENT
Start: 2018-09-18 | End: 2018-09-29

## 2018-09-18 RX ORDER — ACETAMINOPHEN 325 MG/1
650 TABLET ORAL EVERY 4 HOURS PRN
Status: CANCELLED | OUTPATIENT
Start: 2018-09-18

## 2018-09-18 RX ORDER — TRAZODONE HYDROCHLORIDE 50 MG/1
50 TABLET ORAL
Status: DISCONTINUED | OUTPATIENT
Start: 2018-09-18 | End: 2018-09-27

## 2018-09-18 RX ORDER — TRAZODONE HYDROCHLORIDE 50 MG/1
50 TABLET ORAL
Status: CANCELLED | OUTPATIENT
Start: 2018-09-18

## 2018-09-18 RX ORDER — HYDROCHLOROTHIAZIDE 12.5 MG/1
12.5 CAPSULE, GELATIN COATED ORAL DAILY
Status: DISCONTINUED | OUTPATIENT
Start: 2018-09-18 | End: 2018-10-01 | Stop reason: HOSPADM

## 2018-09-18 RX ORDER — ACETAMINOPHEN 325 MG/1
650 TABLET ORAL EVERY 4 HOURS PRN
Status: DISCONTINUED | OUTPATIENT
Start: 2018-09-18 | End: 2018-09-19

## 2018-09-18 RX ORDER — LISINOPRIL 10 MG/1
10 TABLET ORAL DAILY
Status: DISCONTINUED | OUTPATIENT
Start: 2018-09-18 | End: 2018-10-01 | Stop reason: HOSPADM

## 2018-09-18 RX ORDER — ACETAMINOPHEN 325 MG/1
650 TABLET ORAL EVERY 6 HOURS PRN
Status: CANCELLED | OUTPATIENT
Start: 2018-09-18

## 2018-09-18 RX ORDER — LORAZEPAM 2 MG/ML
1 INJECTION INTRAMUSCULAR EVERY 4 HOURS PRN
Status: DISCONTINUED | OUTPATIENT
Start: 2018-09-18 | End: 2018-10-01 | Stop reason: HOSPADM

## 2018-09-18 RX ORDER — RISPERIDONE 0.5 MG/1
0.5 TABLET, ORALLY DISINTEGRATING ORAL EVERY 8 HOURS PRN
Status: CANCELLED | OUTPATIENT
Start: 2018-09-18

## 2018-09-18 RX ORDER — LORATADINE 10 MG/1
10 TABLET ORAL DAILY
Status: DISCONTINUED | OUTPATIENT
Start: 2018-09-19 | End: 2018-10-01 | Stop reason: HOSPADM

## 2018-09-18 RX ORDER — LISINOPRIL 10 MG/1
10 TABLET ORAL ONCE
Status: COMPLETED | OUTPATIENT
Start: 2018-09-18 | End: 2018-09-18

## 2018-09-18 RX ORDER — RISPERIDONE 0.5 MG/1
0.5 TABLET, ORALLY DISINTEGRATING ORAL EVERY 8 HOURS PRN
Status: DISCONTINUED | OUTPATIENT
Start: 2018-09-18 | End: 2018-10-01 | Stop reason: HOSPADM

## 2018-09-18 RX ORDER — HYDROCHLOROTHIAZIDE 12.5 MG/1
12.5 TABLET ORAL DAILY
Status: DISCONTINUED | OUTPATIENT
Start: 2018-09-18 | End: 2018-09-18 | Stop reason: HOSPADM

## 2018-09-18 RX ORDER — LORAZEPAM 2 MG/ML
1 INJECTION INTRAMUSCULAR EVERY 4 HOURS PRN
Status: CANCELLED | OUTPATIENT
Start: 2018-09-18

## 2018-09-18 RX ORDER — ACETAMINOPHEN 325 MG/1
650 TABLET ORAL EVERY 6 HOURS PRN
Status: DISCONTINUED | OUTPATIENT
Start: 2018-09-18 | End: 2018-09-19

## 2018-09-18 RX ORDER — LORAZEPAM 0.5 MG/1
0.5 TABLET ORAL EVERY 4 HOURS PRN
Status: CANCELLED | OUTPATIENT
Start: 2018-09-18

## 2018-09-18 RX ADMIN — LISINOPRIL 10 MG: 10 TABLET ORAL at 17:06

## 2018-09-18 RX ADMIN — HYDROCHLOROTHIAZIDE 12.5 MG: 12.5 TABLET ORAL at 17:08

## 2018-09-18 RX ADMIN — INSULIN HUMAN 12 UNITS: 100 INJECTION, SOLUTION PARENTERAL at 14:39

## 2018-09-18 NOTE — ED NOTES
Susan Hutton, Crisis Lead confirmed no precert needed for VA benefits  At her direction, call to Truman Suh to confirm if notification is needed  No answer  Voice mail message left requesting a return call with direction

## 2018-09-18 NOTE — ED NOTES
Patient is accepted at [address] [place] [address] Drive, Christiano Sanchez  Patient is accepted by Harshad Jackson  Transportation arranged though [facility]   at 17:30  Nurse report is to be called to [phone] prior to patient transfer

## 2018-09-18 NOTE — ED NOTES
Usman Bains of the [place] called in response to referral   She indicated that, per Dr Deanne Mario, they cannot accept the patient at this time  They would request that a fasting blood sugar be repeated in the morning and he could be re-referred at that time  Patient will be referred to [facility], as he previously expressed that he is comfortable with that

## 2018-09-18 NOTE — ED NOTES
Crisis worker met with patient who presents to ED with suicidal ideations, plan, and access to means  Patient plans to overdose on muscle relaxers to get rid of the stressors in his life  Patient reports stressors such as the loss of his job, loss of unemployment, brother moving to [place], and pending legal charges  Patient willing to sign self in for treatment  Patient has VA benefits and a , Royce Kim  Crisis worker to have patient sign 201 and bed search at the [facility]  09/17/2018 2000

## 2018-09-18 NOTE — ED CARE HANDOFF
Emergency Department Sign Out Note        Sign out and transfer of care from Dr Brynn Pisano  See Separate Emergency Department note  The patient, Valery Lackey, was evaluated by the previous provider for psychiatric evaluation      Workup Completed:  Labs Reviewed   UA W REFLEX TO MICROSCOPIC WITH REFLEX TO CULTURE - Abnormal        Result Value Ref Range Status    Color, UA Yellow  Yellow, Straw Final    Clarity, UA Clear  Hazy, Clear Final    Specific Gravity, UA 1 010  >1 005-<1 030 Final    pH, UA 6 0  5 0-<8 0 Final    Leukocytes, UA Negative  Negative Final    Nitrite, UA Negative  Negative Final    Protein, UA Negative  Negative, Interference- unable to analyze mg/dl Final    Glucose, UA 3+ (*) Negative mg/dl Final    Ketones, UA Negative  Negative mg/dl Final    Urobilinogen, UA 0 2  0 2, 1 0 E U /dl E U /dl Final    Bilirubin, UA Negative  Negative Final    Blood, UA Negative  Negative Final   CBC AND DIFFERENTIAL - Abnormal     WBC 3 90 (*) 4 80 - 10 80 Thousand/uL Final    RBC 4 45  4 30 - 5 90 Million/uL Final    Hemoglobin 14 6  14 0 - 18 0 g/dL Final    Hematocrit 41 3  36 5 - 49 3 % Final    MCV 93  81 - 99 fL Final    MCH 32 7  26 0 - 34 0 pg Final    MCHC 35 2  31 0 - 37 0 g/dL Final    RDW 13 4  11 5 - 14 5 % Final    MPV 7 2 (*) 8 6 - 11 7 fL Final    Platelets 978  752 - 390 Thousands/uL Final    nRBC 0  /100 WBCs Final    Neutrophils Relative 60  42 - 75 % Final    Lymphocytes Relative 31  21 - 51 % Final    Monocytes Relative 6  2 - 12 % Final    Eosinophils Relative 3  0 - 5 % Final    Basophils Relative 1  0 - 2 % Final    Neutrophils Absolute 2 30  1 40 - 6 50 Thousands/µL Final    Lymphocytes Absolute 1 20  0 60 - 4 47 Thousands/µL Final    Monocytes Absolute 0 20  0 17 - 1 22 Thousand/µL Final    Eosinophils Absolute 0 10  0 00 - 0 61 Thousand/µL Final    Basophils Absolute 0 00  0 00 - 0 10 Thousands/µL Final   COMPREHENSIVE METABOLIC PANEL - Abnormal     Sodium 134  134 - 143 mmol/L Final    Potassium 3 8  3 5 - 5 5 mmol/L Final    Chloride 96 (*) 98 - 107 mmol/L Final    CO2 31  21 - 31 mmol/L Final    ANION GAP 7  4 - 13 mmol/L Final    BUN 13  7 - 25 mg/dL Final    Creatinine 0 94  0 70 - 1 30 mg/dL Final    Comment: Standardized to IDMS reference method    Glucose 345 (*) 65 - 99 mg/dL Final    Comment:   If the patient is fasting, the ADA then defines impaired fasting glucose as > 100 mg/dL and diabetes as > or equal to 123 mg/dL  Specimen collection should occur prior to Sulfasalazine administration due to the potential for falsely depressed results  Specimen collection should occur prior to Sulfapyridine administration due to the potential for falsely elevated results  Calcium 9 5  8 6 - 10 5 mg/dL Final    AST 16  13 - 39 U/L Final    Comment:   Specimen collection should occur prior to Sulfasalazine administration due to the potential for falsely depressed results  ALT 22  7 - 52 U/L Final    Comment:   Specimen collection should occur prior to Sulfasalazine administration due to the potential for falsely depressed results  Alkaline Phosphatase 58  40 - 150 U/L Final    Total Protein 6 4  6 4 - 8 9 g/dL Final    Albumin 3 7  3 5 - 5 7 g/dL Final    Total Bilirubin 0 70  0 20 - 1 00 mg/dL Final    eGFR 90  ml/min/1 73sq m Final    Narrative:     National Kidney Disease Education Program recommendations are as follows:  GFR calculation is accurate only with a steady state creatinine  Chronic Kidney disease less than 60 ml/min/1 73 sq  meters  Kidney failure less than 15 ml/min/1 73 sq  meters     POCT GLUCOSE - Abnormal     POC Glucose 258 (*) 65 - 140 mg/dl Final   RAPID DRUG SCREEN, URINE - Normal    Amph/Meth UR Negative  Negative Final    Barbiturate Ur Negative  Negative Final    Benzodiazepine Urine Negative  Negative Final    Cocaine Urine Negative  Negative Final    Methadone Urine Negative  Negative Final    Opiate Urine Negative  Negative Final    PCP Ur Negative Negative Final    THC Urine Negative  Negative Final    Narrative:     FOR MEDICAL PURPOSES ONLY  IF CONFIRMATION NEEDED PLEASE CONTACT THE LAB WITHIN 5 DAYS  Drug Screen Cutoff Levels:  AMPHETAMINE/METHAMPHETAMINES  1000 ng/mL  BARBITURATES     200 ng/mL  BENZODIAZEPINES     200 ng/mL  COCAINE      300 ng/mL  METHADONE      300 ng/mL  OPIATES      300 ng/mL  PHENCYCLIDINE     25 ng/mL  THC       50 ng/mL   MEDICAL ALCOHOL - Normal    Ethanol Lvl <10  <10 mg/dL Final   TSH, 3RD GENERATION - Normal    TSH 3RD GENERATON 2 380  0 450 - 5 330 uIU/mL Final    Comment: Using supplements with high doses of biotin 20 to more than 300 times greater than the adequate daily intake for adults of 30 mcg/day as established by the [place] of [employer], can cause falsely depress results  Narrative:     Patients undergoing fluorescein dye angiography may retain small amounts of fluorescein in the body for 48-72 hours post procedure  Samples containing fluorescein can produce falsely depressed TSH values  If the patient had this procedure,a specimen should be resubmitted post fluorescein clearance  ED Course / Workup Pending (followup):  0700 - case discussed and care assumed from Dr Ricardo Khoury reports patient is medically stable has signed 61 [phone] for inpatient psychiatric treatment pending bed assignment and inpatient psychiatric placement  Crisis reports that patient is to be referred to the [place] for inpatient psych treatment and they require blood work chest x-ray and EKG these were obtained and are unremarkable except for blood sugar of 300 for which the patient was medicated and his blood sugar came down into the low 200s and he remains medically stable  patient is medically stable for crisis evaluation and disposition  Plan is for transfer to [facility] [address] [facility] unit as this is where bed availability is at this time                       Procedures  YOJANA Hunter Time      Disposition  Final diagnoses:   Depression with suicidal ideation     Time reflects when diagnosis was documented in both MDM as applicable and the Disposition within this note     Time User Action Codes Description Comment    9/17/2018  4:58 PM Will Jhaveri Add [F32 9, E08 848] Depression with suicidal ideation       ED Disposition     ED Disposition Condition Comment    Transfer to [facility] Francisco Dejesus should be transferred out to [facility] and has been medically cleared  Follow-up Information    None       Patient's Medications   Discharge Prescriptions    No medications on file     No discharge procedures on file         ED Provider  Electronically Signed by     Benigno Mendoza DO  09/18/18 9923

## 2018-09-18 NOTE — ED NOTES
Pt moved from hallway to Room 9 per pt request for privacy and reduction in sound/light so pt can sleep       Octaviano Gosselin, RN  09/18/18 4511

## 2018-09-18 NOTE — PLAN OF CARE
Alteration in Thoughts and Perception     Treatment Goal: Gain control of psychotic behaviors/thinking, reduce/eliminate presenting symptoms and demonstrate improved reality functioning upon discharge Progressing     Verbalize thoughts and feelings Progressing     Refrain from acting on delusional thinking/internal stimuli Progressing     Agree to be compliant with medication regime, as prescribed and report medication side effects Progressing     Attend and participate in unit activities, including therapeutic, recreational, and educational groups Progressing     Recognize dysfunctional thoughts, communicate reality-based thoughts at the time of discharge Progressing     Complete daily ADLs, including personal hygiene independently, as able Progressing        Anxiety     Anxiety is at manageable level Progressing        Depression     Treatment Goal: Demonstrate behavioral control of depressive symptoms, verbalize feelings of improved mood/affect, and adopt new coping skills prior to discharge Progressing     Verbalize thoughts and feelings Progressing     Refrain from harming self Progressing     Refrain from isolation Progressing     Refrain from self-neglect Progressing     Attend and participate in unit activities, including therapeutic, recreational, and educational groups Progressing     Complete daily ADLs, including personal hygiene independently, as able Progressing        Individualized Interventions     Patient will verbalize appropriate use of telephone within 5 days Progressing     Patient will verbalize need for hospitalization and will no longer attempt elopement within 5 days Progressing     Patient will recognize inappropriate behaviors and develop alternative behaviors within 5 days Progressing        Ineffective Coping     Cooperates with admission process Progressing     Identifies ineffective coping skills Progressing     Identifies healthy coping skills Progressing     Demonstrates healthy coping skills Progressing     Participates in unit activities Progressing     Patient/Family participate in treatment and DC plans Progressing     Patient/Family verbalizes awareness of resources Progressing     Understands least restrictive measures Progressing     Free from restraint events Progressing        Risk for Self Injury/Neglect     Treatment Goal: Remain safe during length of stay, learn and adopt new coping skills, and be free of self-injurious ideation, impulses and acts at the time of discharge Progressing     Verbalize thoughts and feelings Progressing     Refrain from harming self Progressing     Attend and participate in unit activities, including therapeutic, recreational, and educational groups Progressing     Recognize maladaptive responses and adopt new coping mechanisms Progressing     Complete daily ADLs, including personal hygiene independently, as able Progressing

## 2018-09-18 NOTE — PROGRESS NOTES
Admission note: Patient arrived to unit at [phone] from [facility] ED  Patient admitted for SI with plan to OD on muscle relaxer's, increased depression over recent loss of job and unemployment benefits  Patient calm and cooperative with admission process  Patient showered and skin assessment completed  Patient denies current SI while inpatient and agrees to come to staff if thoughts are active  Patient pleasant and independent with ADLs  Q 7 minute checks maintained  Monitoring continues

## 2018-09-18 NOTE — NURSING NOTE
Patient does not currently have insurance and reports having run out of blood pressure medications listed  Pharmacy ([facility]) states he has not had medications filled in months  Dr Mortimer Pluck called for medical orders regarding lisinopril-hctz and the need for sliding scale insulin due to patient's uncontrolled blood sugar and reported history of maintaining his sugar utilizing a diet-controlled method

## 2018-09-18 NOTE — ED NOTES
Crisis Worker faxed referral to [facility]  Received response from RN, who indicates no provider available for review at this time, but wanted to confirm that patient is comfortable on an older adult unit  Crisis Worker approached patient, who did agree that he would go to [facility], but expressed interest in a referral to the [place] first   Stated that he has VA benefits and this is his preference  Provided a telephone number for someone he has been talking to through the [place], Dennis Arguelles, [phone]  Call placed to the [place]  They are approaching diversion status, but welcomed a referral, which requires (in addition to already available documents), a UA, EKG and chest X-ray  Dr Ashli Cade notified, and voiced intent to order same  Patient updated on need for additional labs

## 2018-09-18 NOTE — ED NOTES
[facility] called to request additional labs  Dr Gordy Negrete notified  Once labs result, they will be faxed to [place] for review  Patient updated and voiced understanding

## 2018-09-18 NOTE — EMTALA/ACUTE CARE TRANSFER
[address] [facility] [address] [id number]  [phone]  Dept: [phone]      EMTALA TRANSFER CONSENT    NAME Nain Mcnair DOB 1960                              MRN [id number]    I have been informed of my rights regarding examination, treatment, and transfer   by Dr Melissa Olson DO    Benefits: Specialized equipment and/or services available at the receiving facility (Include comment)________________________ (inpatient mental health treatment)    Risks: Potential for delay in receiving treatment, Possible worsening of condition or death during transfer, Increased discomfort during transfer      Consent for Transfer:  I acknowledge that my medical condition has been evaluated and explained to me by the emergency department physician or other qualified medical person and/or my attending physician, who has recommended that I be transferred to the service of  Accepting Physician: LASHELL Duckworth at [facility] Name, [address] : [address] [address] [address]  [place] [address]  The above potential benefits of such transfer, the potential risks associated with such transfer, and the probable risks of not being transferred have been explained to me, and I fully understand them  The doctor has explained that, in my case, the benefits of transfer outweigh the risks  I agree to be transferred  I authorize the performance of emergency medical procedures and treatments upon me in both transit and upon arrival at the receiving facility  Additionally, I authorize the release of any and all medical records to the receiving facility and request they be transported with me, if possible  I understand that the safest mode of transportation during a medical emergency is an ambulance and that the Hospital advocates the use of this mode of transport   Risks of traveling to the receiving facility by car, including absence of medical control, life sustaining equipment, such as oxygen, and medical personnel has been explained to me and I fully understand them  (MAGALI CORRECT BOX BELOW)  [  ]  I consent to the stated transfer and to be transported by ambulance/helicopter  [  ]  I consent to the stated transfer, but refuse transportation by ambulance and accept full responsibility for my transportation by car  I understand the risks of non-ambulance transfers and I exonerate the Hospital and its staff from any deterioration in my condition that results from this refusal     X___________________________________________    DATE  18  TIME________  Signature of patient or legally responsible individual signing on patient behalf           RELATIONSHIP TO PATIENT_________________________          Provider Certification    NAME Oneyda Negrete                                        [facility] 1960                              MRN [id number]    A medical screening exam was performed on the above named patient  Based on the examination:    Condition Necessitating Transfer The primary encounter diagnosis was Depression with suicidal ideation  A diagnosis of Hyperglycemia was also pertinent to this visit  Patient Condition: The patient has been stabilized such that within reasonable medical probability, no material deterioration of the patient condition or the condition of the unborn child(vanessa) is likely to result from the transfer    Reason for Transfer: Level of Care needed not available at this facility    Transfer Requirements: [address] [place] [address]   · Space available and qualified personnel available for treatment as acknowledged by Roby Barney [phone]  · Agreed to accept transfer and to provide appropriate medical treatment as acknowledged by       LASHELL Garza  · Appropriate medical records of the examination and treatment of the patient are provided at the time of transfer   [address] _______  · Transfer will be performed by qualified personnel from [facility] [phone]  and appropriate transfer equipment as required, including the use of necessary and appropriate life support measures  Provider Certification: I have examined the patient and explained the following risks and benefits of being transferred/refusing transfer to the patient/family:  General risk, such as traffic hazards, adverse weather conditions, rough terrain or turbulence, possible failure of equipment (including vehicle or aircraft), or consequences of actions of persons outside the control of the transport personnel, Unanticipated needs of medical equipment and personnel during transport, Risk of worsening condition, The possibility of a transport vehicle being unavailable, The patient is stable for psychiatric transfer because they are medically stable, and is protected from harming him/herself or others during transport      Based on these reasonable risks and benefits to the patient and/or the unborn child(vanessa), and based upon the information available at the time of the patients examination, I certify that the medical benefits reasonably to be expected from the provision of appropriate medical treatments at another medical facility outweigh the increasing risks, if any, to the individuals medical condition, and in the case of labor to the unborn child, from effecting the transfer      X____________________________________________ DATE 09/18/18        TIME_______      ORIGINAL - SEND TO MEDICAL RECORDS   COPY - SEND WITH PATIENT DURING TRANSFER

## 2018-09-19 PROBLEM — F41.9 ANXIETY: Status: ACTIVE | Noted: 2018-09-19

## 2018-09-19 PROBLEM — F43.10 PTSD (POST-TRAUMATIC STRESS DISORDER): Status: ACTIVE | Noted: 2018-09-19

## 2018-09-19 LAB
25(OH)D3 SERPL-MCNC: 10.4 NG/ML (ref 30–100)
EST. AVERAGE GLUCOSE BLD GHB EST-MCNC: 278 MG/DL
GLUCOSE SERPL-MCNC: 148 MG/DL (ref 65–140)
GLUCOSE SERPL-MCNC: 193 MG/DL
GLUCOSE SERPL-MCNC: 193 MG/DL (ref 65–140)
GLUCOSE SERPL-MCNC: 265 MG/DL (ref 65–140)
GLUCOSE SERPL-MCNC: 270 MG/DL
GLUCOSE SERPL-MCNC: 270 MG/DL (ref 65–140)
GLUCOSE SERPL-MCNC: 294 MG/DL (ref 65–140)
GLUCOSE SERPL-MCNC: 300 MG/DL
GLUCOSE SERPL-MCNC: 300 MG/DL (ref 65–140)
HBA1C MFR BLD: 11.3 % (ref 4.2–6.3)
VIT B12 SERPL-MCNC: 254 PG/ML (ref 100–900)

## 2018-09-19 PROCEDURE — 82948 REAGENT STRIP/BLOOD GLUCOSE: CPT

## 2018-09-19 PROCEDURE — 99222 1ST HOSP IP/OBS MODERATE 55: CPT | Performed by: PSYCHIATRY & NEUROLOGY

## 2018-09-19 RX ORDER — ERGOCALCIFEROL 1.25 MG/1
50000 CAPSULE ORAL WEEKLY
Status: DISCONTINUED | OUTPATIENT
Start: 2018-09-19 | End: 2018-10-01 | Stop reason: HOSPADM

## 2018-09-19 RX ORDER — LORATADINE 10 MG/1
CAPSULE, LIQUID FILLED ORAL
Status: ON HOLD | COMMUNITY
End: 2018-09-19

## 2018-09-19 RX ORDER — MELATONIN
1000 DAILY
Status: DISCONTINUED | OUTPATIENT
Start: 2018-12-19 | End: 2018-10-01 | Stop reason: HOSPADM

## 2018-09-19 RX ORDER — INSULIN GLARGINE 100 [IU]/ML
15 INJECTION, SOLUTION SUBCUTANEOUS
Status: DISCONTINUED | OUTPATIENT
Start: 2018-09-19 | End: 2018-09-20

## 2018-09-19 RX ORDER — CYCLOBENZAPRINE HCL 10 MG
TABLET ORAL
Status: ON HOLD | COMMUNITY
End: 2018-09-19

## 2018-09-19 RX ORDER — CYANOCOBALAMIN 1000 UG/ML
1000 INJECTION INTRAMUSCULAR; SUBCUTANEOUS
Status: DISCONTINUED | OUTPATIENT
Start: 2018-09-19 | End: 2018-10-01 | Stop reason: HOSPADM

## 2018-09-19 RX ORDER — LISINOPRIL AND HYDROCHLOROTHIAZIDE 12.5; 1 MG/1; MG/1
1 TABLET ORAL DAILY
Status: ON HOLD | COMMUNITY
Start: 2016-12-26 | End: 2018-09-19

## 2018-09-19 RX ORDER — ACETAMINOPHEN 325 MG/1
650 TABLET ORAL EVERY 6 HOURS PRN
Status: DISCONTINUED | OUTPATIENT
Start: 2018-09-19 | End: 2018-09-23

## 2018-09-19 RX ORDER — INSULIN GLARGINE 100 [IU]/ML
20 INJECTION, SOLUTION SUBCUTANEOUS
Status: DISCONTINUED | OUTPATIENT
Start: 2018-09-19 | End: 2018-09-19

## 2018-09-19 RX ADMIN — ERGOCALCIFEROL 50000 UNITS: 1.25 CAPSULE, LIQUID FILLED ORAL at 11:30

## 2018-09-19 RX ADMIN — METFORMIN HYDROCHLORIDE 500 MG: 500 TABLET ORAL at 16:32

## 2018-09-19 RX ADMIN — HYDROCHLOROTHIAZIDE 12.5 MG: 12.5 CAPSULE ORAL at 08:54

## 2018-09-19 RX ADMIN — INSULIN LISPRO 4 UNITS: 100 INJECTION, SOLUTION INTRAVENOUS; SUBCUTANEOUS at 12:27

## 2018-09-19 RX ADMIN — INSULIN LISPRO 3 UNITS: 100 INJECTION, SOLUTION INTRAVENOUS; SUBCUTANEOUS at 16:33

## 2018-09-19 RX ADMIN — INSULIN GLARGINE 15 UNITS: 100 INJECTION, SOLUTION SUBCUTANEOUS at 21:28

## 2018-09-19 RX ADMIN — INSULIN LISPRO 2 UNITS: 100 INJECTION, SOLUTION INTRAVENOUS; SUBCUTANEOUS at 08:53

## 2018-09-19 RX ADMIN — LISINOPRIL 10 MG: 10 TABLET ORAL at 08:54

## 2018-09-19 RX ADMIN — TRAZODONE HYDROCHLORIDE 50 MG: 50 TABLET ORAL at 21:26

## 2018-09-19 RX ADMIN — CYANOCOBALAMIN 1000 MCG: 1000 INJECTION, SOLUTION INTRAMUSCULAR at 11:25

## 2018-09-19 RX ADMIN — LORATADINE 10 MG: 10 TABLET ORAL at 08:54

## 2018-09-19 RX ADMIN — SERTRALINE HYDROCHLORIDE 50 MG: 50 TABLET ORAL at 11:30
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
## 2018-09-19 NOTE — CASE MANAGEMENT
Writer spoke to Yudelka of [facility] ext [phone] to notify of admission  Yudelka requested information regarding referrals to [facility], [facility] and [place] before  She can approve admission for patient  Writer will follow up with ayleen

## 2018-09-19 NOTE — ED NOTES
Crisis Worker received a call from Donovan Majano, [facility]  Joni Brown assisted with the referral to the [place] on 9/18 and was calling for a status update  Crisis staff explained the requests for multiple labs throughout the day, the denial, the decision to admit to [facility] and the details of the conversations today with Brielle Haji and Mihaela Villareal  She agreed that if the patient was denied at the [facility] for concerns about his medical status (pertaining to the request for fasting glucose in the a m ), then he would not have been accepted to other [place] facilities  She also agreed that it was not appropriate to perpetuate the patient's ED stay  She agreed to speak with people at the [place] in an effort to clear up the issue, and was provided with contact information [facility] should a transfer to another [place] facility be recommended

## 2018-09-19 NOTE — H&P
Elvia Mehta  :1960 [address] Postin  OR:[id number]    DAA:[id number]  Adm Date: 2018  5:56 PM   ATT PHY: Do Harley Schofieldtlin         Chief Complaint:    Increasing depression and anxiety symptoms    History of Presenting Illness: Venessa Ovalle is a(n) 65y o  year old male  Is admitted to [facility] unit because of increasing anxiety and depression symptoms  On examination at bedside patient denied any active suicidal homicidal ideations        Allergies   Allergen Reactions    Amoxicillin GI Intolerance       Current Facility-Administered Medications on File Prior to Encounter   Medication Dose Route Frequency Provider Last Rate Last Dose    [COMPLETED] insulin regular (HumuLIN R,NovoLIN R) injection 12 Units  12 Units Subcutaneous Once [facility], DO   12 Units at 18 1439    [COMPLETED] lisinopril (ZESTRIL) tablet 10 mg  10 mg Oral Once [facility], DO   10 mg at 18 1706    [DISCONTINUED] hydrochlorothiazide (HYDRODIURIL) tablet 12 5 mg  12 5 mg Oral Daily [facility], DO   12 5 mg at 18 1708     Current Outpatient Prescriptions on File Prior to Encounter   Medication Sig Dispense Refill    lisinopril-hydrochlorothiazide (PRINZIDE,ZESTORETIC) 10-12 5 MG per tablet Take 1 tablet by mouth daily      Cyclobenzaprine HCl (FLEXERIL PO) Take 1 tablet by mouth Pt does not know dose      loratadine (CLARITIN) 10 mg tablet Take 10 mg by mouth daily         Active Ambulatory Problems     Diagnosis Date Noted    Current severe episode of major depressive disorder without psychotic features without prior episode ([facility] [place] ) 2018     Resolved Ambulatory Problems     Diagnosis Date Noted    No Resolved Ambulatory Problems     Past Medical History:   Diagnosis Date    Allergic rhinitis     Anxiety     Depression     Diabetes ([facility] [place] )     Hypertension     PTSD (post-traumatic stress disorder)     Sleep difficulties        Past Surgical History:   Procedure Laterality Date    INGUINAL HERNIA REPAIR      KNEE SURGERY      RETINAL DETACHMENT SURGERY      SHOULDER ARTHROSCOPY         Social History:   Social History     Social History    Marital status: Single     Spouse name: N/A    Number of children: N/A    Years of education: N/A     Social History Main Topics    Smoking status: Former Smoker     Packs/day: 0 25     Years: 5 00     Types: Cigarettes     Quit date: 9/18/1988    Smokeless tobacco: Former User     Quit date: 9/18/1988      Comment: patient quit smoking 30 yrs ago    Alcohol use 0 6 oz/week     1 Cans of beer per week      Comment: socially; monthly    Drug use: No    Sexual activity: Not Currently     Other Topics Concern    None     Social History Narrative    None       Family History:   Family History   Problem Relation Age of Onset    Psychiatric Illness Father        Review of Systems   Musculoskeletal: Positive for arthralgias and back pain  All other systems reviewed and are negative  Physical Exam   Constitutional: Awake and Alert  Well-developed and well-nourished  No distress  HENT:   Pupils irregular in shape but reactive,EOMI, conjunctiva normal  Head: Normocephalic and atraumatic  Mouth/Throat: Oropharynx is clear and moist     Eyes: Conjunctivae and EOM are normal  Pupils are equal, round, and reactive to light  Right and left eye exhibits no discharge  Neck: Neck supple  No tracheal deviation present  No thyromegaly present  Cardiovascular: Normal rate, regular rhythm and normal heart sounds  Exam reveals no friction rub  No murmur heard  Pulmonary/Chest: Effort normal and breath sounds normal  No respiratory distress  She has no wheezes  Abdominal: Soft  Bowel sounds are normal  She exhibits no distension  There is no tenderness  There is no rebound and no guarding  Neurological: Cranial Nerves grossly intact  No sensory deficit   Coordination normal    Musculoskeletal:   Nontender spine  Skin: Skin is warm and dry  No rash noted  No diaphoresis  No erythema  No edema  No cyanosis  Assessment     Shad Mitchell is a(n) 65y o  year old male with  MDD with psychotic features    1  Cardiac with history of hypertension:  Patient patient will be continued on HCTZ  and lisinopril  2  Newly diagnosed type 2 diabetes mellitus:  I will start the patient on   Lantus insulin 20 units at bedtime along with metformin 500 mg 2 times daily  I will also get Accu-Cheks 3 times daily along with low-dose sliding scale coverage  Patient's hemoglobin A1c was found to be 11 3  I have consulted dietitian for evaluation and diabetic teaching for the patient  3  Allergic rhinitis:  Loratadine  4  New vitamin B12 deficiency: patient's vitamin B12 levels were found to be low 254  Patient has been put on monthly B12 injections  5  New vitamin-D deficiency:   Patient's vitamin-D levels were found to be very low 10 4  Patient has been put on vitamin-D bolus doses for 14 weeks followed by vitamin D3 1000 units daily  6  DJD/osteoarthritis with chronic lower back pain and right knee pain:  Patient may continue to receive Tylenol on as needed basis  7  Major depressive disorder along with psychotic features:  Patient is being seen by psychiatrist     Prognosis: fair  Discharge Plan: in progress  Advanced Directives: I have discussed in detail the patient the advanced directives  Patient's brother Peggy Traore is patient's legal power-of- and his phone number is [phone]  Patient reports that he does have a living will  When discussing cardiac and pulmonary resuscitation efforts with the patient, the patient wishes to be a FULL CODE  I have spent more than 50 minutes gathering data, doing physical examination, and discussing the advanced directives, which was witnessed by caring staff

## 2018-09-19 NOTE — PLAN OF CARE
Alteration in Thoughts and Perception     Treatment Goal: Gain control of psychotic behaviors/thinking, reduce/eliminate presenting symptoms and demonstrate improved reality functioning upon discharge Progressing     Verbalize thoughts and feelings Progressing     Refrain from acting on delusional thinking/internal stimuli Progressing     Agree to be compliant with medication regime, as prescribed and report medication side effects Progressing     Attend and participate in unit activities, including therapeutic, recreational, and educational groups Progressing     Recognize dysfunctional thoughts, communicate reality-based thoughts at the time of discharge Progressing     Complete daily ADLs, including personal hygiene independently, as able Progressing        Anxiety     Anxiety is at manageable level Progressing        Depression     Treatment Goal: Demonstrate behavioral control of depressive symptoms, verbalize feelings of improved mood/affect, and adopt new coping skills prior to discharge Progressing     Verbalize thoughts and feelings Progressing     Refrain from harming self Progressing     Refrain from isolation Progressing     Refrain from self-neglect Progressing     Attend and participate in unit activities, including therapeutic, recreational, and educational groups Progressing     Complete daily ADLs, including personal hygiene independently, as able [address] Discharge to post-acute care or home with appropriate resources Progressing        Individualized Interventions     Patient will verbalize appropriate use of telephone within 5 days Progressing     Patient will verbalize need for hospitalization and will no longer attempt elopement within 5 days Progressing     Patient will recognize inappropriate behaviors and develop alternative behaviors within 5 days Progressing        Ineffective Coping     Cooperates with admission process Progressing     Identifies ineffective coping skills Progressing     Identifies healthy coping skills Progressing     Demonstrates healthy coping skills Progressing     Participates in unit activities Progressing     Patient/Family participate in treatment and DC plans Progressing     Patient/Family verbalizes awareness of resources Progressing     Understands least restrictive measures Progressing     Free from restraint events Progressing        Nutrition/Hydration-ADULT     Nutrient/Hydration intake appropriate for improving, restoring or maintaining nutritional needs Progressing        Risk for Self Injury/Neglect     Treatment Goal: Remain safe during length of stay, learn and adopt new coping skills, and be free of self-injurious ideation, impulses and acts at the time of discharge Progressing     Verbalize thoughts and feelings Progressing     Refrain from harming self Progressing     Attend and participate in unit activities, including therapeutic, recreational, and educational groups Progressing     Recognize maladaptive responses and adopt new coping mechanisms Progressing     Complete daily ADLs, including personal hygiene independently, as able Progressing          Anxiety     Anxiety is at manageable level Progressing

## 2018-09-19 NOTE — PROGRESS NOTES
Patient remains in bed, sleeping  No acute behaviors observed overnight by staff  Q7 minute checks for patient safety in place  Will continue to monitor

## 2018-09-19 NOTE — PLAN OF CARE
Problem: Ineffective Coping  Goal: Participates in unit activities  Interventions:  - Provide therapeutic environment   - Provide required programming   - Redirect inappropriate behaviors    Outcome: Progressing  Patient attended groups this AM he was engaged and appropriate with his participation  He has been visible on unit spending time with peers socializing

## 2018-09-19 NOTE — PROGRESS NOTES
The pt has been visible and interactive with select peers in the milieu, pt calm, cooperative and compliant with medications, pt rated depression and anxiety as  5/10 but denied current suicidal ideation and verbally contracted for safety, pt denies A/VH, pt is appropriate in conversation with fair eye contact, pt is capable of making needs know and offers no complaints at this time, will continue to monitor for safety and for change of status

## 2018-09-19 NOTE — ED NOTES
Per Marquis Elliott, Utilization Review, a call was placed to the [facility] and a confidential voice mail message was left for Mihaela Villareal at [phone] ext  [facility], notifying her of the patient's admission for coordination of benefits  Voice mail message included Crisis contact information for further clarification as needed, and UR contact information as needed, as well as identifying patient information, date of admission, and facility location

## 2018-09-19 NOTE — PROGRESS NOTES
Patient is currently in bed sleeping; No acute behaviors observed thus far  During the evening hours, patient was present in the milieu socializing with another male patient while watching television  BP meds held as patient informed he received his daily medication earlier in the day  He denies any pain  Patient rates his anxiety and depression both 5/10; denies SI, HI and hallucinations  Will continue to monitor  Q7 minute safety checks in place
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
## 2018-09-19 NOTE — PLAN OF CARE
Problem: Nutrition/Hydration-ADULT  Goal: Nutrient/Hydration intake appropriate for improving, restoring or maintaining nutritional needs  Monitor and assess patient's nutrition/hydration status for malnutrition (ex- brittle hair, bruises, dry skin, pale skin and conjunctiva, muscle wasting, smooth red tongue, and disorientation)  Collaborate with interdisciplinary team and initiate plan and interventions as ordered  Monitor patient's weight and dietary intake as ordered or per policy  Utilize nutrition screening tool and intervene per policy  Determine patient's food preferences and provide high-protein, high-caloric foods as appropriate  INTERVENTIONS:  - Monitor oral intake, urinary output, labs, and treatment plans  - Assess nutrition and hydration status and recommend course of action  - Evaluate amount of meals eaten  - Assist patient with eating if necessary   - Allow adequate time for meals  - Recommend/ encourage appropriate diets, oral nutritional supplements, and vitamin/mineral supplements  - Order, calculate, and assess calorie counts as needed  - Recommend, monitor, and adjust tube feedings and TPN/PPN based on assessed needs  - Assess need for intravenous fluids  - Provide specific nutrition/hydration education as appropriate  - Include patient/family/caregiver in decisions related to nutrition  Outcome: Azeem Clap is receiving a Level 1 CCD diet  His intakes are %  He is receptive to education, will attempt to help him identify carbohydrate containing foods  Strongly recommend follow up after discharge for outpatient mnt

## 2018-09-19 NOTE — ED NOTES
Mayur Keith, of the [facility] called at 1500 asking if the patient was going to be referred for inpatient psychiatric treatment today  Crisis Worker explained that without guarantee of acceptance and after the referral process lasted the duration of the workday, a decision was made to refer the patient to the [facility] rather than continue to hold him in the ED with no assurance that he would be accepted and with no direction to refer to other [place] facilities  Crisis Worker explained that this has been discussed with other [place] staff, including Domingo Herron and Mihaela Villareal with [employer]  She seemed unaware of this  Crisis Worker agreed to call back after completing a current call

## 2018-09-19 NOTE — ED NOTES
Elba Diehl was contacted in response to a previous call  Crisis Worker elaborated on the difficulties with the referral yesterday and explained that after all efforts to refer the patient resulted in a denial with no assurance of future acceptance upon re-referral, the patient was admitted to [facility] as it appeared to be in the patient's best interests to avoid holding him in the ED indefinitely  She apologized, explaining that both she and Augie Perez (the staff member processing the referral in the earlier part of the day on 9/18) are new to the [place] and learning the process, so they were not aware enough to request all the labs initially and/or to advise Crisis staff to refer to other [place] facilities  Crisis explained that patient can be transferred upon request of [place] staff, but that Crisis was awaiting a response from Leonardo Bernard and/or Mihaela Villareal in [facility] per previous conversations  Mirian Maurer admitted that there has been no communication on her part with either of them; she is not familiar with Randi Haines  A voice mail message was left for Aries Weeks, updating her

## 2018-09-19 NOTE — ED NOTES
Aries Weeks [facility], called Crisis Worker to report that per Raquel Granger at the [place], the patient should have been referred to other [place] facilities before being admitted to [facility], and that since he was not, then his stay may not be covered  Rl Brar requested that Crisis Worker contact [facility] to clarify  [phone] ext  [phone], and explained that a referral was made to the [facility] yesterday morning at the patient's request; that no insurance was showing  Crisis Worker explained that contact continued throughout the day with Augie Perez, then [employer], and that multiple requests were made for additional labs every time a request for labs was satisfied, and that the patient was ultimately denied after 1600 hours after initiating a referral before 9 a m  Crisis Worker explained that at no time was it suggested that referrals be made to other [place] facilities  A denial was issued with the option of re-referral in the a m  With additional labs (fasting glucose), but there was no guarantee of acceptance, and the patient could not continue to be held in the ED  Consequently, a referral was made to [facility] and he was accepted and transferred  She voiced understanding, extended apologies for the lengthy referral process and failure to direct for additional [place] referrals, and expressed her intent to review with her director and call back with further direction  Aries Weeks was updated

## 2018-09-20 LAB
GLUCOSE SERPL-MCNC: 197 MG/DL (ref 65–140)
GLUCOSE SERPL-MCNC: 242 MG/DL
GLUCOSE SERPL-MCNC: 260 MG/DL
GLUCOSE SERPL-MCNC: 269 MG/DL

## 2018-09-20 PROCEDURE — 99231 SBSQ HOSP IP/OBS SF/LOW 25: CPT | Performed by: PSYCHIATRY & NEUROLOGY

## 2018-09-20 PROCEDURE — 82948 REAGENT STRIP/BLOOD GLUCOSE: CPT

## 2018-09-20 RX ORDER — INSULIN GLARGINE 100 [IU]/ML
18 INJECTION, SOLUTION SUBCUTANEOUS
Status: DISCONTINUED | OUTPATIENT
Start: 2018-09-20 | End: 2018-10-01 | Stop reason: HOSPADM

## 2018-09-20 RX ADMIN — METFORMIN HYDROCHLORIDE 500 MG: 500 TABLET ORAL at 08:26

## 2018-09-20 RX ADMIN — METFORMIN HYDROCHLORIDE 500 MG: 500 TABLET ORAL at 16:30

## 2018-09-20 RX ADMIN — INSULIN GLARGINE 18 UNITS: 100 INJECTION, SOLUTION SUBCUTANEOUS at 21:22

## 2018-09-20 RX ADMIN — LISINOPRIL 10 MG: 10 TABLET ORAL at 08:25

## 2018-09-20 RX ADMIN — LORATADINE 10 MG: 10 TABLET ORAL at 08:25

## 2018-09-20 RX ADMIN — HYDROCHLOROTHIAZIDE 12.5 MG: 12.5 CAPSULE ORAL at 08:26

## 2018-09-20 RX ADMIN — INSULIN LISPRO 3 UNITS: 100 INJECTION, SOLUTION INTRAVENOUS; SUBCUTANEOUS at 16:28

## 2018-09-20 RX ADMIN — SERTRALINE HYDROCHLORIDE 50 MG: 50 TABLET ORAL at 08:26

## 2018-09-20 RX ADMIN — INSULIN LISPRO 3 UNITS: 100 INJECTION, SOLUTION INTRAVENOUS; SUBCUTANEOUS at 12:11

## 2018-09-20 RX ADMIN — INSULIN LISPRO 2 UNITS: 100 INJECTION, SOLUTION INTRAVENOUS; SUBCUTANEOUS at 07:59

## 2018-09-20 RX ADMIN — TRAZODONE HYDROCHLORIDE 50 MG: 50 TABLET ORAL at 21:12

## 2018-09-20 NOTE — NURSING NOTE
Pt pleasant and cooperative follows unit policies and programing; Denies current SI or HI  No acute behavioral issues noted

## 2018-09-20 NOTE — PROGRESS NOTES
Progress Note - Jose Manuel Ramos 65 y o  male MRN: [id number]    Unit/Bed#: [facility] 839-34 Encounter: [id number]        Subjective:   Patient seen and examined at bedside after reviewing the chart and discussing the case with the caring staff  On examination patient has no acute issues  Patient's blood sugars have improved  This morning patient's fasting blood sugar was found to be 197  Patient is currently on Lantus 15 units at bedtime along with metformin 2 times daily  Physical Exam   Vitals: Blood pressure 125/74, pulse 69, temperature 98 2 °F (36 8 °C), temperature source Tympanic, resp  rate 18, height 5' 6" (1 676 m), weight 75 6 kg (166 lb 9 6 oz), SpO2 95 %  ,Body mass index is 26 89 kg/m²  Constitutional: He appears well-developed  HEENT: PERR, EOMI, MMM  Cardiovascular: Normal rate and regular rhythm  Pulmonary/Chest: Effort normal and breath sounds normal    Abdomen: Soft, + BS, NT    Assessment/Plan:  Jose Manuel Ramos is a(n) 65y o  year old male with  MDD with psychotic features     1  Cardiac with history of hypertension:  Patient patient will be continued on HCTZ  and lisinopril  2  Newly diagnosed type 2 diabetes mellitus:  I will increase patient's  Lantus insulin 18 units at bedtime along with metformin 500 mg 2 times daily  I will also get Accu-Cheks 3 times daily along with low-dose sliding scale coverage  Patient's hemoglobin A1c was found to be 11 3  I have consulted dietitian for evaluation and diabetic teaching for the patient  3  Allergic rhinitis:  Loratadine  4  New vitamin B12 deficiency: patient's vitamin B12 levels were found to be low 254  Patient has been put on monthly B12 injections  5  New vitamin-D deficiency:   Patient's vitamin-D levels were found to be very low 10 4  Patient has been put on vitamin-D bolus doses for 14 weeks followed by vitamin D3 1000 units daily  6   DJD/osteoarthritis with chronic lower back pain and right knee pain:  Patient may continue to receive Tylenol on as needed basis

## 2018-09-20 NOTE — CASE MANAGEMENT
Writer left voice message for Stew Mckenzie at [facility] requesting a call back to receive an update on pt's admission status after reading Noel Thorpees note on 9/19/18

## 2018-09-20 NOTE — PLAN OF CARE
Alteration in Thoughts and Perception     Treatment Goal: Gain control of psychotic behaviors/thinking, reduce/eliminate presenting symptoms and demonstrate improved reality functioning upon discharge Progressing     Verbalize thoughts and feelings Progressing     Refrain from acting on delusional thinking/internal stimuli Progressing     Agree to be compliant with medication regime, as prescribed and report medication side effects Progressing     Attend and participate in unit activities, including therapeutic, recreational, and educational groups Progressing     Recognize dysfunctional thoughts, communicate reality-based thoughts at the time of discharge Progressing     Complete daily ADLs, including personal hygiene independently, as able Progressing        Anxiety     Anxiety is at manageable level Progressing        Depression     Treatment Goal: Demonstrate behavioral control of depressive symptoms, verbalize feelings of improved mood/affect, and adopt new coping skills prior to discharge Progressing     Verbalize thoughts and feelings Progressing     Refrain from harming self Progressing     Refrain from isolation Progressing     Refrain from self-neglect Progressing     Attend and participate in unit activities, including therapeutic, recreational, and educational groups Progressing     Complete daily ADLs, including personal hygiene independently, as able [address] Discharge to post-acute care or home with appropriate resources Progressing        Individualized Interventions     Patient will verbalize appropriate use of telephone within 5 days Progressing     Patient will verbalize need for hospitalization and will no longer attempt elopement within 5 days Progressing     Patient will recognize inappropriate behaviors and develop alternative behaviors within 5 days Progressing        Ineffective Coping     Cooperates with admission process Progressing     Identifies ineffective coping skills Progressing     Identifies healthy coping skills Progressing     Demonstrates healthy coping skills Progressing     Participates in unit activities Progressing     Patient/Family participate in treatment and DC plans Progressing     Patient/Family verbalizes awareness of resources Progressing     Understands least restrictive measures Progressing     Free from restraint events Progressing        Nutrition/Hydration-ADULT     Nutrient/Hydration intake appropriate for improving, restoring or maintaining nutritional needs Progressing        Risk for Self Injury/Neglect     Treatment Goal: Remain safe during length of stay, learn and adopt new coping skills, and be free of self-injurious ideation, impulses and acts at the time of discharge Progressing     Verbalize thoughts and feelings Progressing     Refrain from harming self Progressing     Attend and participate in unit activities, including therapeutic, recreational, and educational groups Progressing     Recognize maladaptive responses and adopt new coping mechanisms Progressing     Complete daily ADLs, including personal hygiene independently, as able Progressing

## 2018-09-20 NOTE — PROGRESS NOTES
The pt has been visible and interactive in the milieu, pt has been calm, cooperative and compliant with medications, when asked concerning depression and anxiety pt stated, "it's better", appeared guarded, reluctant to rate or discuss topic further, the pt denied current suicidal ideation and verbally contracted for safety, pt denies A/VH, pt is appropriate in conversation with fair eye contact, pt is capable of making needs know and offers no complaints at this time, will continue to monitor for safety and for change of status

## 2018-09-20 NOTE — PROGRESS NOTES
Progress Note - Behavioral Health   Cherrie Closs 65 y o  male MRN: [id number]  Unit/Bed#: Vikki [facility] 879-49 Encounter: [id number]    The patient was seen for continuing care and reviewed with treatment team     Mental Status Evaluation:  Appearance:  Adequate hygiene and grooming   Behavior:  cooperative   Mood:  depressed   Affect: Flat   Speech: Soft   Thought Process:  Goal directed and coherent   Thought Content:  Does not verbalize delusional material   Perceptual Disturbances: Denies hallucinations and does not appear to be responding to internal stimuli   Risk Potential: No suicidal or homicidal ideation   Attention/Concentration attention span and concentration were age appropriate   Orientation:   Oriented x 3   Gait/Station: normal gait/station and normal balance   Motor Activity: No abnormal movement noted     Progress Toward Goals: Patient seen in smaller TV room, isolates  He is verbal about financial stressors, but no longer having suicidal thoughts and feels safe here in the hospital   He is tolerating the addition of Zoloft without complaints of side-effects  Reports ongoing anxiety, but "better"  Slept through the night with the use of PRN trazodone  Appetite is 100%  Assessment/Plan    Principal Problem:    Current severe episode of major depressive disorder without psychotic features without prior episode ([facility] [address] )  Active Problems:    PTSD (post-traumatic stress disorder)    Anxiety      Recommended Treatment: Continue with pharmacotherapy, group therapy, milieu therapy and occupational therapy    The patient will be maintained on the following medications:    Current Facility-Administered Medications:  acetaminophen 650 mg Oral Q6H PRN Meeta García MD   [START ON 12/19/2018] cholecalciferol 1,000 Units Oral Daily Meeta García MD   cyanocobalamin 1,000 mcg Intramuscular Q30 Days Meeta García MD   ergocalciferol 50,000 Units Oral Weekly Meeta García MD   lisinopril 10 mg Oral Daily Hong Jesus Madhu Mcginnis MD   And       hydrochlorothiazide 12 5 mg Oral Daily Ulisses Sims MD   insulin glargine 18 Units Subcutaneous HS Ulisses Sims MD   insulin lispro 1-6 Units Subcutaneous TID [facility] Ulisses iSms MD   loratadine 10 mg Oral Daily Ulisses Sims MD   LORazepam 1 mg Intramuscular Q4H PRN Aayush Backer, CRNP   LORazepam 0 5 mg Oral Q4H PRN Aayush Backer, CRNP   metFORMIN 500 mg Oral BID With Meals Ulisses Sims MD   risperiDONE 0 5 mg Oral Q8H PRN Aayush Backer, CRNP   sertraline 50 mg Oral Daily Jess Flannery III, DO   traZODone 50 mg Oral HS PRN Aayush Backer, CRNP       Risks, benefits and possible side effects of Medications:   Risks, benefits, and possible side effects of medications explained to patient and patient verbalizes understanding

## 2018-09-20 NOTE — PLAN OF CARE
Problem: Ineffective Coping  Goal: Participates in unit activities  Interventions:  - Provide therapeutic environment   - Provide required programming   - Redirect inappropriate behaviors    Outcome: Progressing  Patient attended groups this AM he was appropriate with his participation  Will continue to encourage groups daily to keep him from isolating and socialized with peers and staff

## 2018-09-20 NOTE — SOCIAL WORK
SW met with patient for Treatment Plan review and signature  The patient was observed in the dining room at a table with peers  Quiet and receptive to approach  Mood remains depressed/anxious with appropriate affect  Denies SI/intent/plan  Does admit AH at times,  of his friend who committed suicide in [place] in 2006  Patient states he sees and hears him, especially when upset/distressed  States his friend sometimes asks if "I'm coming with him " Patient states he just doesn't listen to that  Reports not having experienced auditory or visual hallucinations today  States he slept better with the aide of medications at   Goals and Objectives reviewed; Patient signed the Treatment Plan

## 2018-09-20 NOTE — SOCIAL WORK
SW spoke with patient re: status of his access to firearms  Patient reports that he does not have access to firearms, stating the reference guns are his brothers and are locked in his brother's safe that requires a digital combination that he does not have  Will f/u with brother, [facility], to confirm

## 2018-09-20 NOTE — CASE MANAGEMENT
Writer received call from Yudelka at [address] will approve pt's admission to [address] [place] [address] and will be responsible for claim  Yudelka asked all clinical be faxed to her along with medication list   Authorization will be generated once Pt  Is discharged

## 2018-09-20 NOTE — NURSING NOTE
[phone]  Pt found to be sleeping on most of authors rounds  No acute behavioral issues noted  Q 7 min checks maintained  Falling star protocol in place

## 2018-09-21 LAB
GLUCOSE SERPL-MCNC: 173 MG/DL (ref 65–140)
GLUCOSE SERPL-MCNC: 201 MG/DL
GLUCOSE SERPL-MCNC: 242 MG/DL (ref 65–140)
GLUCOSE SERPL-MCNC: 244 MG/DL (ref 65–140)
GLUCOSE SERPL-MCNC: 258 MG/DL
GLUCOSE SERPL-MCNC: 258 MG/DL
GLUCOSE SERPL-MCNC: 260 MG/DL (ref 65–140)
GLUCOSE SERPL-MCNC: 269 MG/DL (ref 65–140)

## 2018-09-21 PROCEDURE — 82948 REAGENT STRIP/BLOOD GLUCOSE: CPT

## 2018-09-21 PROCEDURE — 99231 SBSQ HOSP IP/OBS SF/LOW 25: CPT | Performed by: PSYCHIATRY & NEUROLOGY

## 2018-09-21 RX ADMIN — ACETAMINOPHEN 650 MG: 325 TABLET ORAL at 20:01

## 2018-09-21 RX ADMIN — INSULIN GLARGINE 18 UNITS: 100 INJECTION, SOLUTION SUBCUTANEOUS at 21:40

## 2018-09-21 RX ADMIN — TRAZODONE HYDROCHLORIDE 50 MG: 50 TABLET ORAL at 21:40

## 2018-09-21 RX ADMIN — ACETAMINOPHEN 650 MG: 325 TABLET ORAL at 10:15

## 2018-09-21 RX ADMIN — HYDROCHLOROTHIAZIDE 12.5 MG: 12.5 CAPSULE ORAL at 08:36

## 2018-09-21 RX ADMIN — METFORMIN HYDROCHLORIDE 500 MG: 500 TABLET ORAL at 16:07

## 2018-09-21 RX ADMIN — LORATADINE 10 MG: 10 TABLET ORAL at 08:35

## 2018-09-21 RX ADMIN — LISINOPRIL 10 MG: 10 TABLET ORAL at 08:35

## 2018-09-21 RX ADMIN — INSULIN LISPRO 3 UNITS: 100 INJECTION, SOLUTION INTRAVENOUS; SUBCUTANEOUS at 16:06

## 2018-09-21 RX ADMIN — INSULIN LISPRO 3 UNITS: 100 INJECTION, SOLUTION INTRAVENOUS; SUBCUTANEOUS at 08:35

## 2018-09-21 RX ADMIN — INSULIN LISPRO 1 UNITS: 100 INJECTION, SOLUTION INTRAVENOUS; SUBCUTANEOUS at 12:10

## 2018-09-21 RX ADMIN — SERTRALINE HYDROCHLORIDE 50 MG: 50 TABLET ORAL at 08:35

## 2018-09-21 RX ADMIN — METFORMIN HYDROCHLORIDE 500 MG: 500 TABLET ORAL at 08:36

## 2018-09-21 NOTE — CASE MANAGEMENT
[facility] along with progress notes for days 9/20 and 9/21/18 faxed to the [place] Fax# [phone]

## 2018-09-21 NOTE — PROGRESS NOTES
Pt visible on the unit, compliant with medications and meals  Pt denies any SI/HI  No changes from previous shift  Pt pleasant and cooperative  Will continue to monitor for behaviors and changes

## 2018-09-21 NOTE — PROGRESS NOTES
Alen,#  :1960 Rod Ibrahim  BCS:[id number]    IBS:[id number]  Adm Date: 2018  5:56 PM   ATT PHY: Do Harley Torres     The patient was seen after reviewing the chart and discussing the case with caring staff  Today during our encounter, the patient reported no acute concerns other than right knee pain, which he states stems from multiple surgeries on that knee  Objective     Vitals:    18 0643   BP: 118/66   Pulse: 77   Resp: 18   Temp: 97 9 °F (36 6 °C)   SpO2: 94%       General Appearance: Awake and Alert  No acute distress  HEENT: Normocephalic, atraumatic  PERRLA, EOMI, MMM  Heart: RRR, no murmurs  Normal S1 and S2   Lungs: CTA bilaterally with fair air entry  Assessment     Deep franco(n) 65y o  year old male with  MDD with psychotic features     1  Cardiac with history of hypertension: HCTZ and lisinopril  2  Newly diagnosed type 2 diabetes mellitus: Lantus insulin 18 units at bedtime along with metformin 500 mg 2 times daily  Accu-Cheks are 3 times daily along with low-dose sliding scale coverage   Patient's hemoglobin A1c is 11 3  Dietitian is on consult for evaluation and diabetic teaching for the patient  3  Allergic rhinitis:  Loratadine  4  New vitamin B12 deficiency: Monthly B12 injections  5  New vitamin-D deficiency:  Vitamin-D bolus doses for 14 weeks followed by vitamin D3 1000 units daily  6  DJD/osteoarthritis with chronic lower back pain and right knee pain:  Patient may continue to receive Tylenol on as needed basis  The patient was discussed with Dr Broderick Lai and he is in agreement with the above note

## 2018-09-21 NOTE — SOCIAL WORK
sw placed phone call to [employer] - spoke to Officer Cresencio Collet - no pending charges with [facility];   Officer Cresencio Collet directed sw to obtain further information from patient including location of incident and date;  Officer also directed sw to contact state police once that information is obtained as they will likely be the arresting department;  amy thanked Officer Cresencio Collet for assistance;  Call mutually ended

## 2018-09-21 NOTE — PLAN OF CARE
Alteration in Thoughts and Perception     Treatment Goal: Gain control of psychotic behaviors/thinking, reduce/eliminate presenting symptoms and demonstrate improved reality functioning upon discharge Progressing     Verbalize thoughts and feelings Progressing     Refrain from acting on delusional thinking/internal stimuli Progressing     Agree to be compliant with medication regime, as prescribed and report medication side effects Progressing     Attend and participate in unit activities, including therapeutic, recreational, and educational groups Progressing     Recognize dysfunctional thoughts, communicate reality-based thoughts at the time of discharge Progressing     Complete daily ADLs, including personal hygiene independently, as able Progressing        Anxiety     Anxiety is at manageable level Progressing        Depression     Treatment Goal: Demonstrate behavioral control of depressive symptoms, verbalize feelings of improved mood/affect, and adopt new coping skills prior to discharge Progressing     Verbalize thoughts and feelings Progressing     Refrain from harming self Progressing     Refrain from isolation Progressing     Refrain from self-neglect Progressing     Attend and participate in unit activities, including therapeutic, recreational, and educational groups Progressing     Complete daily ADLs, including personal hygiene independently, as able [address] Discharge to post-acute care or home with appropriate resources Progressing        Individualized Interventions     Patient will verbalize appropriate use of telephone within 5 days Progressing     Patient will verbalize need for hospitalization and will no longer attempt elopement within 5 days Progressing     Patient will recognize inappropriate behaviors and develop alternative behaviors within 5 days Progressing        Ineffective Coping     Cooperates with admission process Progressing     Identifies ineffective coping skills Progressing     Identifies healthy coping skills Progressing     Demonstrates healthy coping skills Progressing     Participates in unit activities Progressing     Patient/Family participate in treatment and DC plans Progressing     Patient/Family verbalizes awareness of resources Progressing     Understands least restrictive measures Progressing     Free from restraint events Progressing        Nutrition/Hydration-ADULT     Nutrient/Hydration intake appropriate for improving, restoring or maintaining nutritional needs Progressing        Risk for Self Injury/Neglect     Treatment Goal: Remain safe during length of stay, learn and adopt new coping skills, and be free of self-injurious ideation, impulses and acts at the time of discharge Progressing     Verbalize thoughts and feelings Progressing     Refrain from harming self Progressing     Attend and participate in unit activities, including therapeutic, recreational, and educational groups Progressing     Recognize maladaptive responses and adopt new coping mechanisms Progressing     Complete daily ADLs, including personal hygiene independently, as able Progressing

## 2018-09-21 NOTE — PROGRESS NOTES
Progress Note - Behavioral Health   Peggy Farrar 65 y o  male MRN: [id number]  Unit/Bed#: [facility]on [address] 315-01 Encounter: [id number]    The patient was seen for continuing care and reviewed with treatment team     Mental Status Evaluation:  Appearance:  Adequate hygiene and grooming   Behavior:  calm and cooperative   Mood:  anxious and depressed   Affect: Flat   Speech: Normal rate and Normal volume   Thought Process:  Goal directed and coherent   Thought Content:  Does not verbalize delusional material   Perceptual Disturbances: Denies hallucinations and does not appear to be responding to internal stimuli   Risk Potential: No suicidal or homicidal ideation   Attention/Concentration attention span and concentration were age appropriate   Orientation:   Oriented x 3   Gait/Station: normal gait/station and normal balance   Motor Activity: No abnormal movement noted     Progress Toward Goals: Mood continues depressed and anxious, appears flat/depressed, brightens on approach  Tolerating Zoloft 50 mg day 2  Seems motivated for treatment and hopeful, but overwhelmed by stressors  He will attend groups on the unit  Sleeping with PRN Trazodone 50 mg, appetite is 100%  He is denying any active suicidal thoughts and feels safe here on the unit  No hallucinations or flashbacks today  Assessment/Plan    Principal Problem:    Current severe episode of major depressive disorder without psychotic features without prior episode ([facility] [address] )  Active Problems:    PTSD (post-traumatic stress disorder)    Anxiety      Recommended Treatment: Continue with pharmacotherapy, group therapy, milieu therapy and occupational therapy    The patient will be maintained on the following medications:    Current Facility-Administered Medications:  acetaminophen 650 mg Oral Q6H PRN Parvez Billy MD   [START ON 12/19/2018] cholecalciferol 1,000 Units Oral Daily Parvez Billy MD   cyanocobalamin 1,000 mcg Intramuscular Q30 Days Parvez Billy MD ergocalciferol 50,000 Units Oral Weekly Niki Abreu MD   lisinopril 10 mg Oral Daily Niki Abreu MD   And       hydrochlorothiazide 12 5 mg Oral Daily Niki Abreu MD   insulin glargine 18 Units Subcutaneous HS Niki Abreu MD   insulin lispro 1-6 Units Subcutaneous TID [facility] Niki Abreu MD   loratadine 10 mg Oral Daily Niki Abreu MD   LORazepam 1 mg Intramuscular Q4H PRN LASHELL Francisco   LORazepam 0 5 mg Oral Q4H PRN LASHELL Francisco   metFORMIN 500 mg Oral BID With Meals Niki Abreu MD   risperiDONE 0 5 mg Oral Q8H PRN LASHELL Francisco   sertraline 50 mg Oral Daily Brent Cover III, DO   traZODone 50 mg Oral HS PRN LASHELL Francisco       Risks, benefits and possible side effects of Medications:   Risks, benefits, and possible side effects of medications explained to patient and patient verbalizes understanding

## 2018-09-21 NOTE — NURSING NOTE
Pt withdrawn to self this shift; brightens on approach; reports ongoing anxiety and depression continues to deny SI; pt contacts for safety on and off the unit; no acute behavioral issues noted  Will continue to monitor

## 2018-09-21 NOTE — PLAN OF CARE
Problem: Ineffective Coping  Goal: Participates in unit activities  Interventions:  - Provide therapeutic environment   - Provide required programming   - Redirect inappropriate behaviors    Outcome: Progressing  Patient attended groups this AM, he was engaged and involved and fully participating  He enjoys being with peers and watching TV during downtime on the unit

## 2018-09-21 NOTE — NURSING NOTE
[phone]  Pt found to be sleeping on most of authors rounds  No acute behavioral issues noted  Q 7 min checks maintained  Falling star protocol in place

## 2018-09-21 NOTE — PLAN OF CARE
Alteration in Thoughts and Perception     Treatment Goal: Gain control of psychotic behaviors/thinking, reduce/eliminate presenting symptoms and demonstrate improved reality functioning upon discharge Progressing     Verbalize thoughts and feelings Progressing     Refrain from acting on delusional thinking/internal stimuli Progressing     Agree to be compliant with medication regime, as prescribed and report medication side effects Progressing     Attend and participate in unit activities, including therapeutic, recreational, and educational groups Progressing     Recognize dysfunctional thoughts, communicate reality-based thoughts at the time of discharge Progressing     Complete daily ADLs, including personal hygiene independently, as able Progressing        Anxiety     Anxiety is at manageable level Progressing        Depression     Treatment Goal: Demonstrate behavioral control of depressive symptoms, verbalize feelings of improved mood/affect, and adopt new coping skills prior to discharge Progressing     Verbalize thoughts and feelings Progressing     Refrain from harming self Progressing     Refrain from isolation Progressing     Refrain from self-neglect Progressing     Attend and participate in unit activities, including therapeutic, recreational, and educational groups Progressing     Complete daily ADLs, including personal hygiene independently, as able Progressing        Individualized Interventions     Patient will verbalize appropriate use of telephone within 5 days Progressing     Patient will verbalize need for hospitalization and will no longer attempt elopement within 5 days Progressing     Patient will recognize inappropriate behaviors and develop alternative behaviors within 5 days Progressing        Ineffective Coping     Cooperates with admission process Progressing     Identifies ineffective coping skills Progressing     Identifies healthy coping skills Progressing     Demonstrates healthy coping skills Progressing     Participates in unit activities Progressing     Patient/Family participate in treatment and DC plans Progressing     Patient/Family verbalizes awareness of resources Progressing     Understands least restrictive measures Progressing     Free from restraint events Progressing        Nutrition/Hydration-ADULT     Nutrient/Hydration intake appropriate for improving, restoring or maintaining nutritional needs Progressing        Risk for Self Injury/Neglect     Treatment Goal: Remain safe during length of stay, learn and adopt new coping skills, and be free of self-injurious ideation, impulses and acts at the time of discharge Progressing     Verbalize thoughts and feelings Progressing     Refrain from harming self Progressing     Attend and participate in unit activities, including therapeutic, recreational, and educational groups Progressing     Recognize maladaptive responses and adopt new coping mechanisms Progressing     Complete daily ADLs, including personal hygiene independently, as able Progressing

## 2018-09-22 LAB
GLUCOSE SERPL-MCNC: 147 MG/DL (ref 65–140)
GLUCOSE SERPL-MCNC: 161 MG/DL
GLUCOSE SERPL-MCNC: 161 MG/DL (ref 65–140)
GLUCOSE SERPL-MCNC: 182 MG/DL (ref 65–140)
GLUCOSE SERPL-MCNC: 201 MG/DL (ref 65–140)
GLUCOSE SERPL-MCNC: 250 MG/DL (ref 65–140)
GLUCOSE SERPL-MCNC: 258 MG/DL (ref 65–140)

## 2018-09-22 PROCEDURE — 82948 REAGENT STRIP/BLOOD GLUCOSE: CPT

## 2018-09-22 PROCEDURE — 99231 SBSQ HOSP IP/OBS SF/LOW 25: CPT | Performed by: PSYCHIATRY & NEUROLOGY

## 2018-09-22 RX ADMIN — LORATADINE 10 MG: 10 TABLET ORAL at 08:54

## 2018-09-22 RX ADMIN — INSULIN LISPRO 3 UNITS: 100 INJECTION, SOLUTION INTRAVENOUS; SUBCUTANEOUS at 11:59

## 2018-09-22 RX ADMIN — METFORMIN HYDROCHLORIDE 500 MG: 500 TABLET ORAL at 16:43

## 2018-09-22 RX ADMIN — SERTRALINE HYDROCHLORIDE 50 MG: 50 TABLET ORAL at 08:55

## 2018-09-22 RX ADMIN — METFORMIN HYDROCHLORIDE 500 MG: 500 TABLET ORAL at 08:53

## 2018-09-22 RX ADMIN — TRAZODONE HYDROCHLORIDE 50 MG: 50 TABLET ORAL at 22:01

## 2018-09-22 RX ADMIN — ACETAMINOPHEN 650 MG: 325 TABLET ORAL at 18:52

## 2018-09-22 RX ADMIN — INSULIN LISPRO 1 UNITS: 100 INJECTION, SOLUTION INTRAVENOUS; SUBCUTANEOUS at 16:43

## 2018-09-22 RX ADMIN — INSULIN GLARGINE 18 UNITS: 100 INJECTION, SOLUTION SUBCUTANEOUS at 21:55

## 2018-09-22 RX ADMIN — LISINOPRIL 10 MG: 10 TABLET ORAL at 08:55

## 2018-09-22 RX ADMIN — ACETAMINOPHEN 650 MG: 325 TABLET ORAL at 09:14

## 2018-09-22 RX ADMIN — HYDROCHLOROTHIAZIDE 12.5 MG: 12.5 CAPSULE ORAL at 08:55

## 2018-09-22 NOTE — PROGRESS NOTES
Patient has remained in bed throughout the night, sleeping  He appears comfortable and in no distress; he is lying on his right side  Breathing easy and non-labored  No acute behaviors observed  Q7 minute safety checks in place for patient safety  Will CTM

## 2018-09-22 NOTE — PROGRESS NOTES
Evajuni#  :1960 Shakira Wright  PRU:[id number]    POX:[id number]  Adm Date: 2018  5:56 PM   ATT PHY: Do Harley Haile     The patient was seen after reviewing the chart and discussing the case with caring staff  Today during our encounter, the patient reported no acute concerns other than continued right knee pain, which he states stems from multiple surgeries on that knee  He is also requesting eyes drops for dry eyes  Objective     Vitals:    18 0658   BP: 106/70   Pulse: 77   Resp: 18   Temp: 97 5 °F (36 4 °C)   SpO2: 95%       General Appearance: Awake and Alert  No acute distress  HEENT: Normocephalic, atraumatic  PERRLA, EOMI, MMM  Heart: RRR, no murmurs  Normal S1 and S2   Lungs: CTA bilaterally with fair air entry  Assessment     Kj franco(n) 65y o  year old male with  MDD with psychotic features     1  Cardiac with history of hypertension: HCTZ and lisinopril  2  Newly diagnosed type 2 diabetes mellitus: Lantus insulin 18 units at bedtime along with metformin 500 mg 2 times daily  Accu-Cheks are 3 times daily along with low-dose sliding scale coverage   Patient's hemoglobin A1c is 11 3  Dietitian is on consult for evaluation and diabetic teaching for the patient  3  Allergic rhinitis:  Loratadine  4  New vitamin B12 deficiency: Monthly B12 injections  5  New vitamin-D deficiency:  Vitamin-D bolus doses for 14 weeks followed by vitamin D3 1000 units daily  6  Dry Eyes  Artificial Tears as needed  7  DJD/osteoarthritis with chronic lower back pain and right knee pain:  Patient may continue to receive Tylenol on as needed basis  The patient was discussed with Dr Frankie Prater and he is in agreement with the above note

## 2018-09-22 NOTE — PROGRESS NOTES
Safe bag-   2 pocket knives   Seatbelt cutter   Two conair mikel   Small scissors   2 screws   folic acid   Viagra bottle empty   Testosterone   Muscle growth    With pt-    4 pairs of socks   5 pairs of boxers  Black joel shirt   Navy blue joel shirt   Grey sweatshirt   Black sweatpants string cut out     Duffle bag of miscellaneous items

## 2018-09-22 NOTE — PROGRESS NOTES
Patient was present in the milieu this evening  He is pleasant and cooperative with his care  He c/o right knee pain with a rating of 6/10 for which he received acetaminophen  Pain medication reduced his pain to a 4/10 which he informs is tolerable for him  He rates both his anxiety and depression 5/10  Patient denies SI, HI and hallucinations  Patient was medication compliant at HS  He also received scheduled Lantus insulin; blood glucose checked by staff with a result of 201  Patient informed he has been having difficulty with sleep so he requested PRN Trazodone which was administered as per order  No acute behaviors observed  Q7 minute safety checks in progress  Will CTM

## 2018-09-22 NOTE — PROGRESS NOTES
Progress Note - Behavioral Health   Tessa Anders 65 y o  male MRN: [id number]  Unit/Bed#: [facility] 315-01 Encounter: [id number]    Assessment/Plan   Principal Problem:    Current severe episode of major depressive disorder without psychotic features without prior episode ([facility] [address] )  Active Problems:    PTSD (post-traumatic stress disorder)    Anxiety      Behavior over the last 24 hours:  unchanged  Sleep: normal  Appetite: normal  Medication side effects: No  ROS: no complaints    Mental Status Evaluation:  Appearance:  casually dressed   Behavior:  psychomotor retardation   Speech:  soft   Mood:  anxious and depressed   Affect:  constricted   Thought Process:  goal directed   Thought Content:  anxious ruminations   Perceptual Disturbances: flashbacks   Risk Potential: Suicidal Ideations none   Sensorium:  person and place   Cognition:  grossly intact   Consciousness:  awake    Attention: attention span and concentration were age appropriate   Insight:  age appropriate   Judgment: fair   Gait/Station: normal gait/station   Motor Activity: no abnormal movements     Progress Toward Goals: Pt cont to struggle with low mood and anhedonia but he is trying to stay positive,still admits to flashbacks with some anxiety related symptoms but tolerating Zoloft 50mg daily which was only recently started  Recommended Treatment:   1-Cont current treatment  2-Continue with group therapy, milieu therapy and occupational therapy  Risks, benefits and possible side effects of Medications:   Risks, benefits, and possible side effects of medications explained to patient and patient verbalizes understanding        Medications:   current meds:   Current Facility-Administered Medications   Medication Dose Route Frequency    acetaminophen (TYLENOL) tablet 650 mg  650 mg Oral Q6H PRN    [START ON 12/19/2018] cholecalciferol (VITAMIN D3) tablet 1,000 Units  1,000 Units Oral Daily    cyanocobalamin injection 1,000 mcg  1,000 mcg Intramuscular Q30 Days    dextran 70-hypromellose (GENTEAL TEARS) 0 1-0 3 % ophthalmic solution 1 drop  1 drop Both Eyes Q3H PRN    ergocalciferol (VITAMIN D2) capsule 50,000 Units  50,000 Units Oral Weekly    lisinopril (ZESTRIL) tablet 10 mg  10 mg Oral Daily    And    hydrochlorothiazide (MICROZIDE) capsule 12 5 mg  12 5 mg Oral Daily    insulin glargine (LANTUS) subcutaneous injection 18 Units 0 18 mL  18 Units Subcutaneous HS    insulin lispro (HumaLOG) 100 units/mL subcutaneous injection 1-6 Units  1-6 Units Subcutaneous TID AC    loratadine (CLARITIN) tablet 10 mg  10 mg Oral Daily    LORazepam (ATIVAN) 2 mg/mL injection 1 mg  1 mg Intramuscular Q4H PRN    LORazepam (ATIVAN) tablet 0 5 mg  0 5 mg Oral Q4H PRN    metFORMIN (GLUCOPHAGE) tablet 500 mg  500 mg Oral BID With Meals    risperiDONE (RisperDAL M-TABS) dispersible tablet 0 5 mg  0 5 mg Oral Q8H PRN    sertraline (ZOLOFT) tablet 50 mg  50 mg Oral Daily    traZODone (DESYREL) tablet 50 mg  50 mg Oral HS PRN     Labs: I have personally reviewed pt's labs    Counseling / Coordination of Care  Total floor / unit time spent today 25 minutes  Greater than 50% of total time was spent with the patient and / or family counseling and / or coordination of care   A description of the counseling / coordination of care:

## 2018-09-22 NOTE — PLAN OF CARE
Alteration in Thoughts and Perception     Treatment Goal: Gain control of psychotic behaviors/thinking, reduce/eliminate presenting symptoms and demonstrate improved reality functioning upon discharge Progressing     Verbalize thoughts and feelings Progressing     Refrain from acting on delusional thinking/internal stimuli Progressing     Agree to be compliant with medication regime, as prescribed and report medication side effects Progressing     Attend and participate in unit activities, including therapeutic, recreational, and educational groups Progressing     Recognize dysfunctional thoughts, communicate reality-based thoughts at the time of discharge Progressing     Complete daily ADLs, including personal hygiene independently, as able Progressing        Anxiety     Anxiety is at manageable level Progressing        Depression     Treatment Goal: Demonstrate behavioral control of depressive symptoms, verbalize feelings of improved mood/affect, and adopt new coping skills prior to discharge Progressing     Verbalize thoughts and feelings Progressing     Refrain from harming self Progressing     Refrain from isolation Progressing     Refrain from self-neglect Progressing     Attend and participate in unit activities, including therapeutic, recreational, and educational groups Progressing     Complete daily ADLs, including personal hygiene independently, as able [address] Discharge to post-acute care or home with appropriate resources Progressing        Individualized Interventions     Patient will verbalize appropriate use of telephone within 5 days Progressing     Patient will verbalize need for hospitalization and will no longer attempt elopement within 5 days Progressing     Patient will recognize inappropriate behaviors and develop alternative behaviors within 5 days Progressing        Ineffective Coping     Cooperates with admission process Progressing     Identifies ineffective coping skills Progressing     Identifies healthy coping skills Progressing     Demonstrates healthy coping skills Progressing     Participates in unit activities Progressing     Patient/Family participate in treatment and DC plans Progressing     Patient/Family verbalizes awareness of resources Progressing     Understands least restrictive measures Progressing     Free from restraint events Progressing        Nutrition/Hydration-ADULT     Nutrient/Hydration intake appropriate for improving, restoring or maintaining nutritional needs Progressing        Risk for Self Injury/Neglect     Treatment Goal: Remain safe during length of stay, learn and adopt new coping skills, and be free of self-injurious ideation, impulses and acts at the time of discharge Progressing     Verbalize thoughts and feelings Progressing     Refrain from harming self Progressing     Attend and participate in unit activities, including therapeutic, recreational, and educational groups Progressing     Recognize maladaptive responses and adopt new coping mechanisms Progressing     Complete daily ADLs, including personal hygiene independently, as able Progressing

## 2018-09-22 NOTE — PROGRESS NOTES
Pt  Present in the milieu throughout the day, selectively socializing with peers and staff  Pt affect brightens upon approach  Calm and cooperative with meals and medications  Pt rates depression and anxiety at 5/10 and denies S/I   Pt eager to discuss upcoming plan to apply for [employer] benefits  Pt given tylenol for knee 8/10 knee pain  Medication was effective with pain level reduced to 5/10

## 2018-09-23 LAB
GLUCOSE SERPL-MCNC: 124 MG/DL (ref 65–140)
GLUCOSE SERPL-MCNC: 131 MG/DL (ref 65–140)
GLUCOSE SERPL-MCNC: 194 MG/DL
GLUCOSE SERPL-MCNC: 194 MG/DL (ref 65–140)
GLUCOSE SERPL-MCNC: 217 MG/DL
GLUCOSE SERPL-MCNC: 217 MG/DL (ref 65–140)

## 2018-09-23 PROCEDURE — 99231 SBSQ HOSP IP/OBS SF/LOW 25: CPT | Performed by: PSYCHIATRY & NEUROLOGY

## 2018-09-23 PROCEDURE — 82948 REAGENT STRIP/BLOOD GLUCOSE: CPT

## 2018-09-23 RX ORDER — NAPROXEN 500 MG/1
500 TABLET ORAL EVERY 12 HOURS PRN
Status: DISCONTINUED | OUTPATIENT
Start: 2018-09-23 | End: 2018-09-28

## 2018-09-23 RX ORDER — ACETAMINOPHEN 325 MG/1
325 TABLET ORAL EVERY 4 HOURS PRN
Status: DISCONTINUED | OUTPATIENT
Start: 2018-09-23 | End: 2018-10-01 | Stop reason: HOSPADM

## 2018-09-23 RX ADMIN — HYDROCHLOROTHIAZIDE 12.5 MG: 12.5 CAPSULE ORAL at 09:06

## 2018-09-23 RX ADMIN — METFORMIN HYDROCHLORIDE 500 MG: 500 TABLET ORAL at 16:26

## 2018-09-23 RX ADMIN — TRAZODONE HYDROCHLORIDE 50 MG: 50 TABLET ORAL at 21:16

## 2018-09-23 RX ADMIN — INSULIN LISPRO 2 UNITS: 100 INJECTION, SOLUTION INTRAVENOUS; SUBCUTANEOUS at 16:25

## 2018-09-23 RX ADMIN — METFORMIN HYDROCHLORIDE 500 MG: 500 TABLET ORAL at 09:07

## 2018-09-23 RX ADMIN — INSULIN GLARGINE 18 UNITS: 100 INJECTION, SOLUTION SUBCUTANEOUS at 21:15

## 2018-09-23 RX ADMIN — LORATADINE 10 MG: 10 TABLET ORAL at 09:05

## 2018-09-23 RX ADMIN — SERTRALINE HYDROCHLORIDE 50 MG: 50 TABLET ORAL at 09:06

## 2018-09-23 RX ADMIN — LISINOPRIL 10 MG: 10 TABLET ORAL at 09:06

## 2018-09-23 RX ADMIN — NAPROXEN 500 MG: 500 TABLET ORAL at 19:55

## 2018-09-23 NOTE — PROGRESS NOTES
Evacliffjessica,#  :1960 Therese Herrera  EOK:[id number]    NMP:[id number]  Adm Date: 2018 175  5:56 PM   ATT PHY: Sherwin Chamorro, Do  [address]         Subjective     The patient was seen after reviewing the chart and discussing the case with caring staff  Today during our encounter, the patient reported no acute concerns other than continued right knee pain, for which he is requesting an NSAID  He states having success with Meloxicam in the past     Objective     Vitals:    18 0800   BP: 117/63   Pulse: 91   Resp: 18   Temp: 97 9 °F (36 6 °C)   SpO2: 97%       General Appearance: Awake and Alert  No acute distress  HEENT: Normocephalic, atraumatic  PERRLA, EOMI, MMM  Heart: RRR, no murmurs  Normal S1 and S2   Lungs: CTA bilaterally with fair air entry  Assessment     Jonathan franco(n) 65y o  year old male with  MDD with psychotic features     1  Cardiac with history of hypertension: HCTZ and lisinopril  2  Newly diagnosed type 2 diabetes mellitus: Lantus insulin 18 units at bedtime along with metformin 500 mg 2 times daily  Accu-Cheks are 3 times daily along with low-dose sliding scale coverage   Patient's hemoglobin A1c is 11 3  Dietitian is on consult for evaluation and diabetic teaching for the patient  3  Allergic rhinitis:  Loratadine  4  New vitamin B12 deficiency: Monthly B12 injections  5  New vitamin-D deficiency:  Vitamin-D bolus doses for 14 weeks followed by vitamin D3 1000 units daily  6  Dry Eyes  Artificial Tears as needed  7  DJD/osteoarthritis with chronic lower back pain and right knee pain:  Patient may continue to receive Tylenol on as needed basis for mild pain, and I will add naproxen 500mg every 12 hours as needed for moderate pain  The patient was discussed with Dr Yaya Aquino and he is in agreement with the above note

## 2018-09-23 NOTE — PLAN OF CARE
Problem: Depression  Goal: Verbalize thoughts and feelings  Interventions:  - Assess and re-assess patient's level of risk   - Engage patient in 1:1 interactions, daily, for a minimum of 15 minutes   - Encourage patient to express feelings, fears, frustrations, hopes    Outcome: Progressing

## 2018-09-23 NOTE — PROGRESS NOTES
Patient was present in the milieu during the evening  He was socializing with peers  No acute behaviors observed  He c/o chronic right knee pain for which he has already received pain medication  He rates his anxiety and depression both 5/10  He denies SI, HI and hallucinations  Q7 minute safety checks in progress  Will CTM

## 2018-09-23 NOTE — PROGRESS NOTES
Pt up ad gabriel & gait is steady  Pt denies any suicidal or homicidal ideations  Q 7 min checks maintained to monitor pt's behavior & safety  Pt denies any discomfort thus far today  Pt pleasant & socializes with other patients  Pt c/o depression 5/10 & anxiety 5/10  Skin warm & dry to touch

## 2018-09-23 NOTE — PROGRESS NOTES
Patient has appeared to be sleeping throughout the night  No acute behaviors exhibited by patient  Bed is in lowest position  Q7 minute safety checks in progress  Will CTM

## 2018-09-23 NOTE — PROGRESS NOTES
Progress Note - Behavioral Health   Abida Ballard 65 y o  male MRN: [id number]  Unit/Bed#: [facility] 315-01 Encounter: [id number]    Assessment/Plan   Principal Problem:    Current severe episode of major depressive disorder without psychotic features without prior episode ([address] )  Active Problems:    PTSD (post-traumatic stress disorder)    Anxiety      Behavior over the last 24 hours:  unchanged  Sleep: normal  Appetite: normal  Medication side effects: No  ROS: no complaints    Mental Status Evaluation:  Appearance:  casually dressed   Behavior:  psychomotor retardation   Speech:  soft   Mood:  anxious and depressed   Affect:  constricted   Thought Process:  goal directed   Thought Content:  anxious ruminations   Perceptual Disturbances: flashbacks   Risk Potential: Suicidal Ideations none   Sensorium:  person and place   Cognition:  grossly intact   Consciousness:  awake    Attention: attention span and concentration were age appropriate   Insight:  age appropriate   Judgment: fair   Gait/Station: normal gait/station   Motor Activity: no abnormal movements     Progress Toward Goals: Pt does report a modest improvement in his mood though he cont to struggle with despair, he is trying to stay positive,still admits to flashbacks with some anxiety related symptoms but tolerating Zoloft 50mg daily which was only recently started  Staff report no behavioral issues,he is engaged in unit milue  Recommended Treatment:   1-Cont current treatment  2-Continue with group therapy, milieu therapy and occupational therapy  Risks, benefits and possible side effects of Medications:   Risks, benefits, and possible side effects of medications explained to patient and patient verbalizes understanding        Medications:   current meds:   Current Facility-Administered Medications   Medication Dose Route Frequency    acetaminophen (TYLENOL) tablet 325 mg  325 mg Oral Q4H PRN    [START ON 12/19/2018] cholecalciferol (VITAMIN D3) tablet 1,000 Units  1,000 Units Oral Daily    cyanocobalamin injection 1,000 mcg  1,000 mcg Intramuscular Q30 Days    dextran 70-hypromellose (GENTEAL TEARS) 0 1-0 3 % ophthalmic solution 1 drop  1 drop Both Eyes Q3H PRN    ergocalciferol (VITAMIN D2) capsule 50,000 Units  50,000 Units Oral Weekly    lisinopril (ZESTRIL) tablet 10 mg  10 mg Oral Daily    And    hydrochlorothiazide (MICROZIDE) capsule 12 5 mg  12 5 mg Oral Daily    insulin glargine (LANTUS) subcutaneous injection 18 Units 0 18 mL  18 Units Subcutaneous HS    insulin lispro (HumaLOG) 100 units/mL subcutaneous injection 1-6 Units  1-6 Units Subcutaneous TID AC    loratadine (CLARITIN) tablet 10 mg  10 mg Oral Daily    LORazepam (ATIVAN) 2 mg/mL injection 1 mg  1 mg Intramuscular Q4H PRN    LORazepam (ATIVAN) tablet 0 5 mg  0 5 mg Oral Q4H PRN    metFORMIN (GLUCOPHAGE) tablet 500 mg  500 mg Oral BID With Meals    naproxen (NAPROSYN) tablet 500 mg  500 mg Oral Q12H PRN    risperiDONE (RisperDAL M-TABS) dispersible tablet 0 5 mg  0 5 mg Oral Q8H PRN    sertraline (ZOLOFT) tablet 50 mg  50 mg Oral Daily    traZODone (DESYREL) tablet 50 mg  50 mg Oral HS PRN     Labs: I have personally reviewed pt's labs    Counseling / Coordination of Care  Total floor / unit time spent today 25 minutes  Greater than 50% of total time was spent with the patient and / or family counseling and / or coordination of care   A description of the counseling / coordination of care:

## 2018-09-24 LAB
GLUCOSE SERPL-MCNC: 136 MG/DL (ref 65–140)
GLUCOSE SERPL-MCNC: 167 MG/DL (ref 65–140)
GLUCOSE SERPL-MCNC: 185 MG/DL
GLUCOSE SERPL-MCNC: 204 MG/DL (ref 65–140)

## 2018-09-24 PROCEDURE — 82948 REAGENT STRIP/BLOOD GLUCOSE: CPT

## 2018-09-24 PROCEDURE — 99232 SBSQ HOSP IP/OBS MODERATE 35: CPT | Performed by: PSYCHIATRY & NEUROLOGY

## 2018-09-24 RX ORDER — SERTRALINE HYDROCHLORIDE 100 MG/1
100 TABLET, FILM COATED ORAL DAILY
Status: DISCONTINUED | OUTPATIENT
Start: 2018-09-25 | End: 2018-09-28

## 2018-09-24 RX ADMIN — TRAZODONE HYDROCHLORIDE 50 MG: 50 TABLET ORAL at 21:24

## 2018-09-24 RX ADMIN — INSULIN LISPRO 1 UNITS: 100 INJECTION, SOLUTION INTRAVENOUS; SUBCUTANEOUS at 08:23

## 2018-09-24 RX ADMIN — HYDROCHLOROTHIAZIDE 12.5 MG: 12.5 CAPSULE ORAL at 08:22

## 2018-09-24 RX ADMIN — LORATADINE 10 MG: 10 TABLET ORAL at 08:22

## 2018-09-24 RX ADMIN — INSULIN GLARGINE 18 UNITS: 100 INJECTION, SOLUTION SUBCUTANEOUS at 21:24

## 2018-09-24 RX ADMIN — METFORMIN HYDROCHLORIDE 500 MG: 500 TABLET ORAL at 08:21

## 2018-09-24 RX ADMIN — SERTRALINE HYDROCHLORIDE 50 MG: 50 TABLET ORAL at 08:22

## 2018-09-24 RX ADMIN — LISINOPRIL 10 MG: 10 TABLET ORAL at 08:21

## 2018-09-24 RX ADMIN — METFORMIN HYDROCHLORIDE 500 MG: 500 TABLET ORAL at 15:56

## 2018-09-24 NOTE — PROGRESS NOTES
Alen,#  :1960 Chase Chen  CSD:[id number]    ILX:[id number]  Adm Date: 2018  5:56 PM   ATT PHY: Do Harley Kramer         Subjective     The patient was seen after reviewing the chart and discussing the case with caring staff  Today during our encounter, the patient reported no acute concerns  He states that the naproxen was effective in relieving his pain  Objective     Vitals:    18 0821   BP: 122/72   Pulse:    Resp:    Temp:    SpO2:        General Appearance: Awake and Alert  No acute distress  HEENT: Normocephalic, atraumatic  PERRLA, EOMI, MMM  Heart: RRR, no murmurs  Normal S1 and S2   Lungs: CTA bilaterally with fair air entry  Assessment     Epifanio franco(n) 65y o  year old male with  MDD with psychotic features     1  Cardiac with history of hypertension: HCTZ and lisinopril  2  Newly diagnosed type 2 diabetes mellitus: Lantus insulin 18 units at bedtime along with metformin 500 mg 2 times daily  Accu-Cheks are 3 times daily along with low-dose sliding scale coverage   Patient's hemoglobin A1c is 11 3  Dietitian is on consult for evaluation and diabetic teaching for the patient  3  Allergic rhinitis:  Loratadine  4  New vitamin B12 deficiency: Monthly B12 injections  5  New vitamin-D deficiency:  Vitamin-D bolus doses for 14 weeks followed by vitamin D3 1000 units daily  6  Dry Eyes  Artificial Tears as needed  7  DJD/osteoarthritis with chronic lower back pain and right knee pain:  Patient may continue to receive Tylenol on as needed basis for mild pain and naproxen 500mg every 12 hours as needed for moderate pain  The patient was discussed with Dr Deanne Thomas and he is in agreement with the above note

## 2018-09-24 NOTE — PROGRESS NOTES
Patient remains in his room, sleeping at this time  He appears to be comfortable with no distress observed  No acute behaviors overnight  Q7 minute safety checks in progress

## 2018-09-24 NOTE — PLAN OF CARE
Problem: Ineffective Coping  Goal: Participates in unit activities  Interventions:  - Provide therapeutic environment   - Provide required programming   - Redirect inappropriate behaviors    Outcome: Progressing  Patient attended groups this morning; he was engaged and fully participated  He was looking to talk with RT this AM; he wanted to share what happend over the weekend  He was was brighter and very social with staff and peers

## 2018-09-24 NOTE — PROGRESS NOTES
Progress Note - Behavioral Health   Tessa Anders 65 y o  male MRN: [id number]  Unit/Bed#: Hallie Singh Encounter: [id number]    The patient was seen for continuing care and reviewed with treatment team     Mental Status Evaluation:  Appearance:  Adequate hygiene and grooming   Behavior:  calm and cooperative   Mood:  anxious and depressed   Affect: Flat   Speech: Normal rate and Normal volume   Thought Process:  Goal directed and coherent   Thought Content:  Does not verbalize delusional material   Perceptual Disturbances: Denies hallucinations and does not appear to be responding to internal stimuli   Risk Potential: Hopeless with death wishes   Attention/Concentration attention span and concentration were age appropriate   Orientation:   Oriented x 3   Gait/Station: normal gait/station and normal balance   Motor Activity: No abnormal movement noted     Progress Toward Goals: Patient reports he does not feel as good today as he did on Friday, as he is starting to think of all he must face on discharge and fears his suicidal thoughts may return  Will increase his Zoloft to 100 mg  He denies any active suicidal thoughts and feels safe her in the hospital   Sleeping through the night with PRN trazodone  Appetite is 100%  Currently denying any flashbacks or hallucinations today  Assessment/Plan    Principal Problem:    Current severe episode of major depressive disorder without psychotic features without prior episode ([facility] [address] )  Active Problems:    PTSD (post-traumatic stress disorder)    Anxiety      Recommended Treatment: Continue with pharmacotherapy, group therapy, milieu therapy and occupational therapy    The patient will be maintained on the following medications:    Current Facility-Administered Medications:  acetaminophen 325 mg Oral Q4H PRN Ema Thomas PA-C   [START ON 12/19/2018] cholecalciferol 1,000 Units Oral Daily Cheryle Smoker, MD   cyanocobalamin 1,000 mcg Intramuscular Q30 Days Cheryle Smoker, MD   dextran 70-hypromellose 1 drop Both Eyes Q3H PRN Mendel Majestic Rockovits, PA-C   ergocalciferol 50,000 Units Oral Weekly Matti Ramirez MD   lisinopril 10 mg Oral Daily Matti Ramirez MD   And       hydrochlorothiazide 12 5 mg Oral Daily Matti Ramirez MD   insulin glargine 18 Units Subcutaneous HS Matti Ramirez MD   insulin lispro 1-6 Units Subcutaneous TID LAKSHMI Thomas PA-C   loratadine 10 mg Oral Daily Matti Ramirez MD   LORazepam 1 mg Intramuscular Q4H PRN LASHELL Frias   LORazepam 0 5 mg Oral Q4H PRN LASHELL Frias   metFORMIN 500 mg Oral BID With Meals Matti Ramirez MD   naproxen 500 mg Oral Q12H PRN Jeremy Rogers PA-C   risperiDONE 0 5 mg Oral Q8H PRN LASHELL Frias   [START ON 9/25/2018] sertraline 100 mg Oral Daily LASHELL Frias   traZODone 50 mg Oral HS PRN LASHELL Frias       Risks, benefits and possible side effects of Medications:   Risks, benefits, and possible side effects of medications explained to patient and patient verbalizes understanding

## 2018-09-24 NOTE — PROGRESS NOTES
Pt c/o depression 6/10 & anxiety 6/10  Pt denies any suicidal or homicidal ideations  Pt up ad gabriel & gait is steady  Pt denies any pain or discomfort  Q 7 min checks maintained to monitor pt's behavior & safety  Pt does socialize with other patients

## 2018-09-24 NOTE — PROGRESS NOTES
Patient has been present in the dining room; socializing with peers  He is pleasant  He cooperates with staff  Patient is medication compliant  He does c/o right knee pain with a rating of 5/10  This pain is chronic  Naproxen administered as per order with relief to patient  He rates his anxiety and depression both 6/10  Denies SI, HI and hallucinations  Q7 minute safety checks in progress  Will CTM

## 2018-09-25 LAB
GLUCOSE SERPL-MCNC: 180 MG/DL
GLUCOSE SERPL-MCNC: 180 MG/DL (ref 65–140)
GLUCOSE SERPL-MCNC: 183 MG/DL (ref 65–140)
GLUCOSE SERPL-MCNC: 185 MG/DL (ref 65–140)
GLUCOSE SERPL-MCNC: 186 MG/DL
GLUCOSE SERPL-MCNC: 186 MG/DL (ref 65–140)
GLUCOSE SERPL-MCNC: 229 MG/DL
GLUCOSE SERPL-MCNC: 229 MG/DL (ref 65–140)
GLUCOSE SERPL-MCNC: 236 MG/DL
GLUCOSE SERPL-MCNC: 256 MG/DL (ref 65–140)

## 2018-09-25 PROCEDURE — 99231 SBSQ HOSP IP/OBS SF/LOW 25: CPT | Performed by: PSYCHIATRY & NEUROLOGY

## 2018-09-25 PROCEDURE — 82948 REAGENT STRIP/BLOOD GLUCOSE: CPT

## 2018-09-25 RX ORDER — NAPROXEN 500 MG/1
500 TABLET ORAL EVERY 12 HOURS PRN
Qty: 20 TABLET | Refills: 0 | Status: SHIPPED | OUTPATIENT
Start: 2018-09-25 | End: 2021-01-01 | Stop reason: HOSPADM

## 2018-09-25 RX ORDER — INSULIN GLARGINE 100 [IU]/ML
18 INJECTION, SOLUTION SUBCUTANEOUS
Qty: 10 ML | Refills: 0 | Status: SHIPPED | OUTPATIENT
Start: 2018-09-25

## 2018-09-25 RX ORDER — ERGOCALCIFEROL 1.25 MG/1
50000 CAPSULE ORAL WEEKLY
Qty: 13 CAPSULE | Refills: 0 | Status: SHIPPED | OUTPATIENT
Start: 2018-09-26 | End: 2021-01-01 | Stop reason: HOSPADM

## 2018-09-25 RX ORDER — LISINOPRIL AND HYDROCHLOROTHIAZIDE 12.5; 1 MG/1; MG/1
1 TABLET ORAL DAILY
Qty: 30 TABLET | Refills: 0 | Status: SHIPPED | OUTPATIENT
Start: 2018-09-25 | End: 2021-01-01 | Stop reason: HOSPADM

## 2018-09-25 RX ORDER — CYANOCOBALAMIN 1000 UG/ML
1000 INJECTION INTRAMUSCULAR; SUBCUTANEOUS
Qty: 1 ML | Refills: 1 | Status: SHIPPED | OUTPATIENT
Start: 2018-10-19

## 2018-09-25 RX ADMIN — INSULIN GLARGINE 18 UNITS: 100 INJECTION, SOLUTION SUBCUTANEOUS at 21:04

## 2018-09-25 RX ADMIN — NAPROXEN 500 MG: 500 TABLET ORAL at 08:50

## 2018-09-25 RX ADMIN — METFORMIN HYDROCHLORIDE 500 MG: 500 TABLET ORAL at 08:16

## 2018-09-25 RX ADMIN — TRAZODONE HYDROCHLORIDE 50 MG: 50 TABLET ORAL at 21:14

## 2018-09-25 RX ADMIN — HYDROCHLOROTHIAZIDE 12.5 MG: 12.5 CAPSULE ORAL at 08:17

## 2018-09-25 RX ADMIN — LISINOPRIL 10 MG: 10 TABLET ORAL at 08:16

## 2018-09-25 RX ADMIN — LORATADINE 10 MG: 10 TABLET ORAL at 08:17

## 2018-09-25 RX ADMIN — METFORMIN HYDROCHLORIDE 500 MG: 500 TABLET ORAL at 17:06

## 2018-09-25 RX ADMIN — SERTRALINE HYDROCHLORIDE 100 MG: 100 TABLET ORAL at 08:17

## 2018-09-25 NOTE — CASE MANAGEMENT
Psych Progress notes for days 9/22, 9/23 and 9/24/18 faxed to Yudelka at [place] as requested by [place]

## 2018-09-25 NOTE — PROGRESS NOTES
Progress Note - Behavioral Health   Konstantin Lopes 65 y o  male MRN: [id number]  Unit/Bed#: Ileana Sims Encounter: [id number]    The patient was seen for continuing care and reviewed with treatment team     Mental Status Evaluation:  Appearance:  Adequate hygiene and grooming   Behavior:  cooperative   Mood:  anxious and depressed   Affect: Flat   Speech: Soft   Thought Process:  negative   Thought Content:  Does not verbalize delusional material   Perceptual Disturbances: Denies hallucinations and does not appear to be responding to internal stimuli   Risk Potential: Hopeless with death wishes   Attention/Concentration attention span and concentration were age appropriate   Orientation:   Oriented x 3   Gait/Station: normal gait/station and normal balance   Motor Activity: No abnormal movement noted     Progress Toward Goals: Continues flat and depressed, tells staff his depression is "up a bit today"  Concerns about where he will live on discharge, uncertain he has a place to go  Denying any suicidal thoughts currently, concerned he would not be safe outside the hospital   He started the increased dose of Zoloft today  Required PRN Trazodone for sleep last night  Appetite 100% and attending groups  Assessment/Plan    Principal Problem:    Current severe episode of major depressive disorder without psychotic features without prior episode ([facility] [address] )  Active Problems:    PTSD (post-traumatic stress disorder)    Anxiety      Recommended Treatment: Continue with pharmacotherapy, group therapy, milieu therapy and occupational therapy    The patient will be maintained on the following medications:    Current Facility-Administered Medications:  acetaminophen 325 mg Oral Q4H PRN Delia Thomas PA-C   [START ON 12/19/2018] cholecalciferol 1,000 Units Oral Daily Niki Abreu MD   cyanocobalamin 1,000 mcg Intramuscular Q30 Days Niki Abreu MD   dextran 70-hypromellose 1 drop Both Eyes Q3H PRN SONDRA Sanders ergocalciferol 50,000 Units Oral Weekly Kassy Ewing MD   lisinopril 10 mg Oral Daily Kassy Ewing MD   And       hydrochlorothiazide 12 5 mg Oral Daily Kassy Ewing MD   insulin glargine 18 Units Subcutaneous HS Kassy Ewing MD   insulin lispro 1-6 Units Subcutaneous TID AC Antony Thomas PA-C   loratadine 10 mg Oral Daily Kassy Ewing MD   LORazepam 1 mg Intramuscular Q4H PRN Janae Raul, CRNP   LORazepam 0 5 mg Oral Q4H PRN Janae Raul, CRNP   metFORMIN 500 mg Oral BID With Meals Kassy Ewing MD   naproxen 500 mg Oral Q12H PRN Isidra Pike PA-C   risperiDONE 0 5 mg Oral Q8H PRN Janae Raul, CRNP   sertraline 100 mg Oral Daily Janae Raul, CRNP   traZODone 50 mg Oral HS PRN Janae Raul, CRNP       Risks, benefits and possible side effects of Medications:   Risks, benefits, and possible side effects of medications explained to patient and patient verbalizes understanding

## 2018-09-25 NOTE — PROGRESS NOTES
Patient noted to be visible in common milieu upon initial assessment  The patient's affect noted to be flat and quiet  The patient states that his depression is "up a little bit" and his anxiety is "not that bad " The patient denies si and hi  The patient complained of 6/10 right leg pain and requested Naproxen, medication administered at 0850  The patient states that his pain is a 4/10 upon reassessment at 0950, is satisfied with pain relief and denies the need for further pain intervention  Patient noted to be compliant with meals and medications  Will continue to monitor

## 2018-09-25 NOTE — PROGRESS NOTES
Patient out in the milieu social with peers and staff  Ate HS snack  Upon approach patients mood and affect is flat and depressed  Rates depression 7/10 and anxiety 5/10  Patient sad about some news he heard today that a good friend  of an OD  Calm and cooperative  Denies any pain  Accucheck was 185  Scheduled 18 units of Lantus given  Prn Trazodone give for sleep at   Took medications without difficulty  No behavioral issues  No complaints or concerns  Will continue to monitor safety and behaviors every 7 minutes

## 2018-09-25 NOTE — PROGRESS NOTES
Patient sleeping without difficulty  No behavioral issues  No complaints or concerns  Will continue to monitor safety and behaviors every 7 minutes

## 2018-09-25 NOTE — PROGRESS NOTES
Alen,#  :1960 Marie Joyce  NBR:[id number]    FRM:[id number]  Adm Date: 2018  5:56 PM   ATT PHY: Do Harley Dunn     The patient was seen after reviewing the chart and discussing the case with caring staff  Today during our encounter, the patient reported no acute concerns  Of note, patient is likely scheduled for discharge tomorrow  Patient is medically cleared for discharge  Medication list has been reconciled  Scripts have been filled out  ADDENDUM: Patient is no longer scheduled for discharge tomorrow  Possible discharge later this week  Objective     Vitals:    18 0625   BP: 110/69   Pulse: 78   Resp: 18   Temp: 97 5 °F (36 4 °C)   SpO2: 95%       General Appearance: Awake and Alert  No acute distress  HEENT: Normocephalic, atraumatic  PERRLA, EOMI, MMM  Heart: RRR, no murmurs  Normal S1 and S2   Lungs: CTA bilaterally with fair air entry  Assessment     Christelle franco(n) 65y o  year old male with  MDD with psychotic features    Medical Clearance: Patient is medically cleared for discharge possibly later this week  Medication list has been reconciled  Scripts have been filled out       1  Cardiac with history of hypertension: HCTZ and lisinopril  2  Newly diagnosed type 2 diabetes mellitus: Lantus insulin 18 units at bedtime along with metformin 500 mg 2 times daily  Accu-Cheks are 3 times daily along with low-dose sliding scale coverage   Patient's hemoglobin A1c is 11 3  Dietitian is on consult for evaluation and diabetic teaching for the patient  I will write scripts for metformin and Lantus, but I informed the patient to first follow up with his primary care physician for further management, as it may be more appropriate to increase the metformin and/or add another non-insulin medication   Alternatively, given the patient's high A1c, the PCP may also make the decision for continued Lantus therapy, in which case the patient will require education on proper administration and monitoring  3  Allergic rhinitis:  Loratadine  4  New vitamin B12 deficiency: Monthly B12 injections  5  New vitamin-D deficiency:  Vitamin-D bolus doses for 13 weeks followed by vitamin D3 1000 units daily  6  Dry Eyes  Artificial Tears as needed  7  DJD/osteoarthritis with chronic lower back pain and right knee pain:  Patient may continue to receive Tylenol on as needed basis for mild pain and naproxen 500mg every 12 hours as needed for moderate pain  The patient was discussed with Dr Konstantin Obrien and he is in agreement with the above note

## 2018-09-25 NOTE — PLAN OF CARE
Alteration in Thoughts and Perception     Treatment Goal: Gain control of psychotic behaviors/thinking, reduce/eliminate presenting symptoms and demonstrate improved reality functioning upon discharge Progressing     Verbalize thoughts and feelings Progressing     Refrain from acting on delusional thinking/internal stimuli Progressing     Agree to be compliant with medication regime, as prescribed and report medication side effects Progressing     Attend and participate in unit activities, including therapeutic, recreational, and educational groups Progressing     Recognize dysfunctional thoughts, communicate reality-based thoughts at the time of discharge Progressing     Complete daily ADLs, including personal hygiene independently, as able Progressing        Anxiety     Anxiety is at manageable level Progressing        Depression     Treatment Goal: Demonstrate behavioral control of depressive symptoms, verbalize feelings of improved mood/affect, and adopt new coping skills prior to discharge Progressing     Verbalize thoughts and feelings Progressing     Refrain from harming self Progressing     Refrain from isolation Progressing     Refrain from self-neglect Progressing     Complete daily ADLs, including personal hygiene independently, as able Progressing        Individualized Interventions     Patient will verbalize appropriate use of telephone within 5 days Progressing     Patient will verbalize need for hospitalization and will no longer attempt elopement within 5 days Progressing     Patient will recognize inappropriate behaviors and develop alternative behaviors within 5 days Progressing        Ineffective Coping     Cooperates with admission process Progressing     Identifies ineffective coping skills Progressing     Identifies healthy coping skills Progressing     Demonstrates healthy coping skills Progressing     Participates in unit activities Progressing     Patient/Family participate in treatment and DC plans Progressing     Patient/Family verbalizes awareness of resources Progressing     Understands least restrictive measures Progressing     Free from restraint events Progressing        Risk for Self Injury/Neglect     Treatment Goal: Remain safe during length of stay, learn and adopt new coping skills, and be free of self-injurious ideation, impulses and acts at the time of discharge Progressing     Verbalize thoughts and feelings Progressing     Refrain from harming self Progressing     Attend and participate in unit activities, including therapeutic, recreational, and educational groups Progressing     Recognize maladaptive responses and adopt new coping mechanisms Progressing     Complete daily ADLs, including personal hygiene independently, as able Progressing

## 2018-09-25 NOTE — PROGRESS NOTES
Upon reassessment of PRN Trazodone given at 2124  Patient was sleeping without difficulty   Will continue to monitor safety and behaviors every 7 minutes

## 2018-09-26 LAB
GLUCOSE SERPL-MCNC: 159 MG/DL (ref 65–140)
GLUCOSE SERPL-MCNC: 163 MG/DL (ref 65–140)
GLUCOSE SERPL-MCNC: 181 MG/DL (ref 65–140)
GLUCOSE SERPL-MCNC: 201 MG/DL (ref 65–140)
GLUCOSE SERPL-MCNC: 204 MG/DL
GLUCOSE SERPL-MCNC: 235 MG/DL (ref 65–140)

## 2018-09-26 PROCEDURE — 82948 REAGENT STRIP/BLOOD GLUCOSE: CPT

## 2018-09-26 PROCEDURE — 99231 SBSQ HOSP IP/OBS SF/LOW 25: CPT | Performed by: PSYCHIATRY & NEUROLOGY

## 2018-09-26 RX ADMIN — INSULIN GLARGINE 18 UNITS: 100 INJECTION, SOLUTION SUBCUTANEOUS at 21:35

## 2018-09-26 RX ADMIN — LORATADINE 10 MG: 10 TABLET ORAL at 08:41

## 2018-09-26 RX ADMIN — NAPROXEN 500 MG: 500 TABLET ORAL at 08:41

## 2018-09-26 RX ADMIN — SERTRALINE HYDROCHLORIDE 100 MG: 100 TABLET ORAL at 08:40

## 2018-09-26 RX ADMIN — HYDROCHLOROTHIAZIDE 12.5 MG: 12.5 CAPSULE ORAL at 08:41

## 2018-09-26 RX ADMIN — LISINOPRIL 10 MG: 10 TABLET ORAL at 08:41

## 2018-09-26 RX ADMIN — ERGOCALCIFEROL 50000 UNITS: 1.25 CAPSULE, LIQUID FILLED ORAL at 08:41

## 2018-09-26 RX ADMIN — TRAZODONE HYDROCHLORIDE 50 MG: 50 TABLET ORAL at 21:34

## 2018-09-26 RX ADMIN — METFORMIN HYDROCHLORIDE 500 MG: 500 TABLET ORAL at 16:32

## 2018-09-26 RX ADMIN — METFORMIN HYDROCHLORIDE 500 MG: 500 TABLET ORAL at 08:41

## 2018-09-26 NOTE — PLAN OF CARE
Problem: Ineffective Coping  Goal: Participates in unit activities  Interventions:  - Provide therapeutic environment   - Provide required programming   - Redirect inappropriate behaviors    Outcome: Progressing  Patient is participating in groups daily; he also interacts with peers and staff for socialization during downtime on unit

## 2018-09-26 NOTE — PROGRESS NOTES
Progress Note - Behavioral Health   Cherrie Closs 65 y o  male MRN: [id number]  Unit/Bed#: [facility] Encounter: [id number]    The patient was seen for continuing care and reviewed with treatment team     Mental Status Evaluation:  Appearance:  Adequate hygiene and grooming   Behavior:  cooperative   Mood:  depressed   Affect: Flat   Speech: Normal rate and Normal volume   Thought Process:  Goal directed and coherent   Thought Content:  Does not verbalize delusional material   Perceptual Disturbances: Denies hallucinations and does not appear to be responding to internal stimuli   Risk Potential: No suicidal or homicidal ideation   Attention/Concentration attention span and concentration were age appropriate   Orientation:   Oriented x 3   Gait/Station: normal gait/station and normal balance   Motor Activity: No abnormal movement noted     Progress Toward Goals: Patient continues to report intermittent SI, but no current plan or intent, and feels safe here in the hospital   Starting to feel "a little" improvement in depression and anxiety and addressing issues for discharge  He is out of his room and attending groups, social with select male peers  Sleeps through the night with PRN trazodone  Appetite 100%  Assessment/Plan    Principal Problem:    Current severe episode of major depressive disorder without psychotic features without prior episode ([facility] [address] )  Active Problems:    PTSD (post-traumatic stress disorder)    Anxiety      Recommended Treatment: Continue with pharmacotherapy, group therapy, milieu therapy and occupational therapy    The patient will be maintained on the following medications:    Current Facility-Administered Medications:  acetaminophen 325 mg Oral Q4H PRN Willy Thomas PA-C   [START ON 12/19/2018] cholecalciferol 1,000 Units Oral Daily Meeta García MD   cyanocobalamin 1,000 mcg Intramuscular Q30 Days Meeta García MD   dextran 70-hypromellose 1 drop Both Eyes Q3H PRN Willy Milton SONDRA Thomas   ergocalciferol 50,000 Units Oral Weekly Sara Linares MD   lisinopril 10 mg Oral Daily Sara Linares MD   And       hydrochlorothiazide 12 5 mg Oral Daily Sara Linares MD   insulin glargine 18 Units Subcutaneous HS Sara Linares MD   insulin lispro 1-6 Units Subcutaneous TID AC Antony Thomas PA-C   loratadine 10 mg Oral Daily Sara Linares MD   LORazepam 1 mg Intramuscular Q4H PRN Paige Lye, CRNP   LORazepam 0 5 mg Oral Q4H PRN Paige Lye, CRNP   metFORMIN 500 mg Oral BID With Meals Sara Linares MD   naproxen 500 mg Oral Q12H PRN Georgie Ulrich PA-C   risperiDONE 0 5 mg Oral Q8H PRN Paige Lye, CRNP   sertraline 100 mg Oral Daily Paige Lye, CRNP   traZODone 50 mg Oral HS PRN Paige Lye, CRNP       Risks, benefits and possible side effects of Medications:   Risks, benefits, and possible side effects of medications explained to patient and patient verbalizes understanding

## 2018-09-26 NOTE — PROGRESS NOTES
Patient noted to be visible in milieu upon initial assessment and successive rounds  Patient noted to be socializing with peers  Patient rates anxiety 5/10 and depression 6/10  Patient states that he feels his mood is "better " Patient complaining of  6/10 pain to right knee, Naproxen administered at 0841  Patient states pain improved and rates pain 3/10 upon reassessment at 0940  Patient noted to be compliant with meals, medications and noted to attend group  No further complaints noted at this time  Will continue to monitor

## 2018-09-26 NOTE — PROGRESS NOTES
Alen,#  :1960 Anthony Matthews  XSE:[id number]    XRK:[id number]  Adm Date: 2018  5:56 PM   ATT PHY: Arabella Lora, Do  [address]         Subjective     The patient was seen after reviewing the chart and discussing the case with caring staff  Today during our encounter, the patient reported no acute concerns  Of note, patient is likely scheduled for discharge this week  Patient is medically cleared for discharge  Medication list has been reconciled  Scripts have been filled out  Objective     Vitals:    18 0618   BP: 120/75   Pulse: 83   Resp: 18   Temp: 97 9 °F (36 6 °C)   SpO2: 95%       General Appearance: Awake and Alert  No acute distress  HEENT: Normocephalic, atraumatic  PERRLA, EOMI, MMM  Heart: RRR, no murmurs  Normal S1 and S2   Lungs: CTA bilaterally with fair air entry  Assessment     Mayda franco(n) 65y o  year old male with  MDD with psychotic features    Medical Clearance: Patient is medically cleared for discharge possibly later this week  Medication list has been reconciled  Scripts have been filled out       1  Cardiac with history of hypertension: HCTZ and lisinopril  2  Newly diagnosed type 2 diabetes mellitus: Lantus insulin 18 units at bedtime along with metformin 500 mg 2 times daily  Accu-Cheks are 3 times daily along with low-dose sliding scale coverage   Patient's hemoglobin A1c is 11 3  Dietitian is on consult for evaluation and diabetic teaching for the patient  I will write scripts for metformin and Lantus, but I informed the patient to first follow up with his primary care physician for further management, as it may be more appropriate to increase the metformin and/or add another non-insulin medication   Alternatively, given the patient's high A1c, the PCP may also make the decision for continued Lantus therapy, in which case the patient will require education on proper administration and monitoring  3  Allergic rhinitis:  Loratadine  4  New vitamin B12 deficiency: Monthly B12 injections  5  New vitamin-D deficiency:  Vitamin-D bolus doses for 13 weeks followed by vitamin D3 1000 units daily  6  Dry Eyes  Artificial Tears as needed  7  DJD/osteoarthritis with chronic lower back pain and right knee pain:  Patient may continue to receive Tylenol on as needed basis for mild pain and naproxen 500mg every 12 hours as needed for moderate pain

## 2018-09-26 NOTE — PROGRESS NOTES
Pt sitting in dining room at this time  Rates depression 5-6 and denies anxiety  States of chronic right knee pain at #6  Refused pain medication when offered  Rn started educating patient on self administering insulin in preparation of discharge  Patient states he never gave insulin before but his friend will probably do it for him  Instructed on how to draw up air into needle and insert into the insulin vial   Next to push air into vial and withdraw the desired amount of insulin  Insulin administered in left arm at pt request  Pt then requested trazadone for sleep and same administered  Q 7 minute checks maintained as well as fall precautions  Continue to monitor

## 2018-09-26 NOTE — PLAN OF CARE
Alteration in Thoughts and Perception     Treatment Goal: Gain control of psychotic behaviors/thinking, reduce/eliminate presenting symptoms and demonstrate improved reality functioning upon discharge Progressing     Verbalize thoughts and feelings Progressing     Refrain from acting on delusional thinking/internal stimuli Progressing     Agree to be compliant with medication regime, as prescribed and report medication side effects Progressing     Attend and participate in unit activities, including therapeutic, recreational, and educational groups Progressing     Recognize dysfunctional thoughts, communicate reality-based thoughts at the time of discharge Progressing     Complete daily ADLs, including personal hygiene independently, as able Progressing        Anxiety     Anxiety is at manageable level Progressing        Depression     Treatment Goal: Demonstrate behavioral control of depressive symptoms, verbalize feelings of improved mood/affect, and adopt new coping skills prior to discharge Progressing     Verbalize thoughts and feelings Progressing     Refrain from harming self Progressing     Refrain from isolation Progressing     Refrain from self-neglect Progressing     Attend and participate in unit activities, including therapeutic, recreational, and educational groups Progressing     Complete daily ADLs, including personal hygiene independently, as able Progressing        Individualized Interventions     Patient will verbalize appropriate use of telephone within 5 days Progressing     Patient will verbalize need for hospitalization and will no longer attempt elopement within 5 days Progressing     Patient will recognize inappropriate behaviors and develop alternative behaviors within 5 days Progressing        Ineffective Coping     Cooperates with admission process Progressing     Identifies ineffective coping skills Progressing     Identifies healthy coping skills Progressing     Demonstrates healthy coping skills Progressing     Patient/Family participate in treatment and DC plans Progressing     Patient/Family verbalizes awareness of resources Progressing     Understands least restrictive measures Progressing     Free from restraint events Progressing        Risk for Self Injury/Neglect     Treatment Goal: Remain safe during length of stay, learn and adopt new coping skills, and be free of self-injurious ideation, impulses and acts at the time of discharge Progressing     Verbalize thoughts and feelings Progressing     Refrain from harming self Progressing     Recognize maladaptive responses and adopt new coping mechanisms Progressing     Complete daily ADLs, including personal hygiene independently, as able Progressing

## 2018-09-26 NOTE — SOCIAL WORK
SW met with patient to review the Treatment Plan  The patient was observed in the dining room sitting with peers  Pleasant and engaging on approach  States he continues to experience intermittent  A/V 'hallucinations' of his  friend, which "doesn't go away " Reports fleeting SI without intent/plan  At this point, he is hopeful that the CM at the [place], whom he stated is helping him with housing, "will come through " Patient made aware that d/c would be based on medical necessity and may occur prior to arranging the referenced housing  Declined referral to a shelter, stating he will return to his apt  temporarily, if the landlord agrees, as he hasn't paid September's rent and has no money  Cooperative and continues to be motivated to work through issues/problems  Goals and Objectives of the Treatment Plan were reviewed; patient reported understanding and agreement and signed the Treatment Plan

## 2018-09-26 NOTE — PLAN OF CARE
Problem: Nutrition/Hydration-ADULT  Goal: Nutrient/Hydration intake appropriate for improving, restoring or maintaining nutritional needs  Monitor and assess patient's nutrition/hydration status for malnutrition (ex- brittle hair, bruises, dry skin, pale skin and conjunctiva, muscle wasting, smooth red tongue, and disorientation)  Collaborate with interdisciplinary team and initiate plan and interventions as ordered  Monitor patient's weight and dietary intake as ordered or per policy  Utilize nutrition screening tool and intervene per policy  Determine patient's food preferences and provide high-protein, high-caloric foods as appropriate  INTERVENTIONS:  - Monitor oral intake, urinary output, labs, and treatment plans  - Assess nutrition and hydration status and recommend course of action  - Evaluate amount of meals eaten  - Assist patient with eating if necessary   - Allow adequate time for meals  - Recommend/ encourage appropriate diets, oral nutritional supplements, and vitamin/mineral supplements  - Order, calculate, and assess calorie counts as needed  - Recommend, monitor, and adjust tube feedings and TPN/PPN based on assessed needs  - Assess need for intravenous fluids  - Provide specific nutrition/hydration education as appropriate  - Include patient/family/caregiver in decisions related to nutrition   Outcome: Christi Alaniz has excellent intakes, he is receiving a ccd level 1 diet  He reports he is trying to follow the [employer] eating recommendations

## 2018-09-27 LAB
GLUCOSE SERPL-MCNC: 124 MG/DL
GLUCOSE SERPL-MCNC: 124 MG/DL (ref 65–140)
GLUCOSE SERPL-MCNC: 154 MG/DL (ref 65–140)
GLUCOSE SERPL-MCNC: 194 MG/DL (ref 65–140)
GLUCOSE SERPL-MCNC: 214 MG/DL (ref 65–140)

## 2018-09-27 PROCEDURE — 82948 REAGENT STRIP/BLOOD GLUCOSE: CPT

## 2018-09-27 PROCEDURE — 99231 SBSQ HOSP IP/OBS SF/LOW 25: CPT | Performed by: PSYCHIATRY & NEUROLOGY

## 2018-09-27 RX ORDER — TRAZODONE HYDROCHLORIDE 50 MG/1
100 TABLET ORAL
Status: DISCONTINUED | OUTPATIENT
Start: 2018-09-27 | End: 2018-09-29

## 2018-09-27 RX ADMIN — METFORMIN HYDROCHLORIDE 500 MG: 500 TABLET ORAL at 08:32

## 2018-09-27 RX ADMIN — LISINOPRIL 10 MG: 10 TABLET ORAL at 08:33

## 2018-09-27 RX ADMIN — METFORMIN HYDROCHLORIDE 500 MG: 500 TABLET ORAL at 15:56

## 2018-09-27 RX ADMIN — HYDROCHLOROTHIAZIDE 12.5 MG: 12.5 CAPSULE ORAL at 08:32

## 2018-09-27 RX ADMIN — INSULIN GLARGINE 18 UNITS: 100 INJECTION, SOLUTION SUBCUTANEOUS at 21:43

## 2018-09-27 RX ADMIN — SERTRALINE HYDROCHLORIDE 100 MG: 100 TABLET ORAL at 08:33

## 2018-09-27 RX ADMIN — LORATADINE 10 MG: 10 TABLET ORAL at 08:33

## 2018-09-27 RX ADMIN — TRAZODONE HYDROCHLORIDE 100 MG: 50 TABLET ORAL at 21:43

## 2018-09-27 NOTE — DISCHARGE INSTR - OTHER ORDERS
You are being discharged to your apartment at [address] [employer], [place] Phone: [phone]    Triggers you have identified during your hospital that led to suicidal ideation include financial and medical issues  Coping skills you have identified during your hospitalization include fishing, listening to music, and taking walks  If you are unable to deal with your distressed mood alone please speak to your brother, Peggy Traore, or contact your psychiatric providers at the [place]  If that is not effective and you continue to have suicidal ideation or distressed mood, please call [facility] at [phone], dial 333, or go to the nearest emergency center  [address] [place], [place] [address]way: [address] [facility] [place] Suicide Prevention Lifeline:  8-357.196.4978 or Send a text message  796771 NN connect with a [facility] responder  *Peer Support Talk Line (Seven days a week, 1:00 PM  9:00 PM) Call: 208.541.8716 or Text: [address] on [address] [place] [address]) HELPLINE: 217.574.2816/Website: www norma org  *Substance Abuse and [address] [facility] Administration([facility]) [employer], which is a confidential, free, 24-hour-a-day, 365-day-a-year, information service for individuals and family members facing mental health and/or substance use disorders  This service provides referrals to local treatment facilities, support groups, and community-based organizations  Callers can also order free publications and other information  Call 9-371.379.3531/Website: www [facility] gov  *[facility] 2-1-1: This is a toll free, confidential, 24-hour-a-day service which connects you to a community  in your area who can help you find services and resources that are available to you locally and provide critical services that can improve and save lives    Call: 211  /Website: https://verenice trejo/

## 2018-09-27 NOTE — CASE MANAGEMENT
Psych Progress notes for days 9/25 and 9/26/18 along with med list faxed to Yudelka at [address] [facility]

## 2018-09-27 NOTE — PROGRESS NOTES
Progress Note - Behavioral Health   Lauren Patel 65 y o  male MRN: [id number]  Unit/Bed#: [facility] Encounter: [id number]    The patient was seen for continuing care and reviewed with treatment team     Mental Status Evaluation:  Appearance:  Adequate hygiene and grooming   Behavior:  calm and cooperative   Mood:  anxious and depressed   Affect: Flat   Speech: Normal rate and Normal volume   Thought Process:  Goal directed and coherent   Thought Content:  Does not verbalize delusional material   Perceptual Disturbances: Auditory hallucinations without commands   Risk Potential: No suicidal or homicidal ideation   Attention/Concentration attention span and concentration were age appropriate   Orientation:   Oriented x 3   Gait/Station: normal gait/station and normal balance   Motor Activity: No abnormal movement noted     Progress Toward Goals: Continues to show improvements in depression and now denying any suicidal thoughts  Still with symptoms of PTSD, hears voice of  friend at times  More optimistic about future, with plans in place for assistance from the [place]  Takes PRN trazodone for sleep, appetite is 100%  Assessment/Plan    Principal Problem:    Current severe episode of major depressive disorder without psychotic features without prior episode ([facility] [address] )  Active Problems:    PTSD (post-traumatic stress disorder)    Anxiety      Recommended Treatment: Continue with pharmacotherapy, group therapy, milieu therapy and occupational therapy    The patient will be maintained on the following medications:    Current Facility-Administered Medications:  acetaminophen 325 mg Oral Q4H PRN Chip Thomas PA-C   [START ON 2018] cholecalciferol 1,000 Units Oral Daily Sami Guerra MD   cyanocobalamin 1,000 mcg Intramuscular Q30 Days Sami Guerra MD   dextran 70-hypromellose 1 drop Both Eyes Q3H PRN Chip Thomas PA-C   ergocalciferol 50,000 Units Oral Weekly Sami Guerra MD   lisinopril 10 mg Oral Daily Twila Price MD   And       hydrochlorothiazide 12 5 mg Oral Daily Twila Price MD   insulin glargine 18 Units Subcutaneous HS Twila Price MD   insulin lispro 1-6 Units Subcutaneous TID AC Antony Thomas PA-C   loratadine 10 mg Oral Daily Twila Price MD   LORazepam 1 mg Intramuscular Q4H PRN Phoebe Conception, CRNP   LORazepam 0 5 mg Oral Q4H PRN Phoebe Conception, CRNP   metFORMIN 500 mg Oral BID With Meals Twila Price MD   naproxen 500 mg Oral Q12H PRN Jose Brian PA-C   risperiDONE 0 5 mg Oral Q8H PRN Phoebe Conception, CRNP   sertraline 100 mg Oral Daily Phoebe Conception, CRNP   traZODone 50 mg Oral HS PRN Phoebe Conception, CRNP       Risks, benefits and possible side effects of Medications:   Risks, benefits, and possible side effects of medications explained to patient and patient verbalizes understanding

## 2018-09-27 NOTE — PLAN OF CARE
Problem: Ineffective Coping  Goal: Participates in unit activities  Interventions:  - Provide therapeutic environment   - Provide required programming   - Redirect inappropriate behaviors    Outcome: Progressing  Patient continues to join groups daily with participation; he is engaged in groups offered  He enjoys being social with peers and staff

## 2018-09-27 NOTE — PROGRESS NOTES
Pt observed to be out in the community, interacting with staff and peers  Pt affect flat, mood depressed, he verbalized that he was feeling okay, but not yet ready to go home at this time  Pt compliant with medications and meals  Pt denied suicidal ideation  Pt checked Q7 minutes for safety

## 2018-09-27 NOTE — DISCHARGE INSTR - APPOINTMENTS
The treatment team recommends ongoing medication management upon discharge  A referral has been made on your behalf to [facility] , [place], [place]  Phone: [phone]  Your intake appointment has been scheduled with Maral Beal RN on Thursday, Oct 4th at [address] will be providing your transportation and are scheduled to pick you up at 8am (*they will call the evening prior to confirm your pickup time)  You are to go to the [address] [place] [address] Drive office upon arrival and see Mariella Atwood LCSW to begin your applications for assistance  Peter Donovan will then take you to see Huong Baum suicide prevention  and then to your Mental Health Appointment  Your discharge will be faxed to this provider for continuity of care  You will be seen by the [facility] Team on Tuesday, October 23rd at [facility] for your primary care needs  You will be opened to these services on this date

## 2018-09-27 NOTE — PLAN OF CARE
Problem: Anxiety  Goal: Anxiety is at manageable level  Interventions:  - Assess and monitor patient's anxiety level  - Monitor for signs and symptoms of anxiety both physical and emotional (heart palpitations, chest pain, shortness of breath, headaches, nausea, feeling jumpy, restlessness, irritable, apprehensive)  - Collaborate with interdisciplinary team and initiate plan and interventions as ordered    - [place] patient to unit/surroundings  - Explain treatment plan  - Encourage participation in care  - Encourage verbalization of concerns/fears  - Identify coping mechanisms  - Assist in developing anxiety-reducing skills  - Administer/offer alternative therapies  - Limit or eliminate stimulants   Outcome: Progressing

## 2018-09-27 NOTE — SOCIAL WORK
sw met with patient in sw office to discuss barriers to discharge; Pt states he is uncertain if he can return to apartment and amy Rodriges at [place] working on housing issues;  sw and pt placed phone call to Clari Rodriges at [address] [place] [address] states that if patient can return to apartment, [place] will work towards funding assistance for rent;   If pt cannot return, [facility] will work toward getting [facility] into homeless programs but pt may have to discharge to community shelter in [address] time;  Pt expressed understanding;  sw will contact Clari Rodriges once clarification on housing is made     sw and pt made phone call to brad Wallace (MOIZ obtained); brad states that patient can return to apartment;  amy advised that [place] is working towards rent payment;  Tyrone thanked sw and pt for update;  Call mutually ended      sw placed phone call to Clari Rodriges to advise of update - left message on voicemail, awaiting response

## 2018-09-27 NOTE — SOCIAL WORK
sw received phone call from pt Smiley Ty at [place];  Pt scheduled for mental health intake at [facility] on Thurs 10/4 at 11am;  Pt will be transported by Jennifer Ansari who will call pt evening prior to schedule;   Pt will be seen by Brendon Mars, RN;  Pt is to go to  office prior to RN Appointment to start assistance applications for housing an SS;  Pt will also meet with suicide prevention , Maritza Stallings on 10/4;  Jenny Badillo requested  fax # - sw provided; Pt SW will fax appointment sheet to this  for patient discharge packet;  Call mutually ended

## 2018-09-27 NOTE — NURSING NOTE
[phone]  Pt found to be sleeping on most of authors rounds  No acute behavioral issues noted  Q 7 min checks maintained  Falling star protocol in place

## 2018-09-27 NOTE — PROGRESS NOTES
Pt up ad gabriel & gait is steady  Pt c/o depression 5/10 & anxiety 8/10  Pt denies any suicidal or homicidal ideations  Pt does socialize with other patients  Q 7 min checks maintained to monitor pt's behavior & safety  Pt does contract for safety  Pt denies any pain or discomfort thus far today

## 2018-09-27 NOTE — PROGRESS NOTES
Alen#  :1960 Particia Postin  IFX:[id number]    BAD:[id number]  Adm Date: 2018  5:56 PM   ATT PHY: Paramjit Foote, Do  [address]         Subjective     The patient was seen after reviewing the chart and discussing the case with caring staff  Today during our encounter, the patient reported no acute concerns  Patient is likely scheduled for discharge some time this week  He remains medically cleared for discharge  Medication list has been reconciled  Scripts have been filled out  Objective     Vitals:    18 0833   BP: 114/84   Pulse:    Resp:    Temp:    SpO2:        General Appearance: Awake and Alert  No acute distress  HEENT: Normocephalic, atraumatic  PERRLA, EOMI, MMM  Heart: RRR, no murmurs  Normal S1 and S2   Lungs: CTA bilaterally with fair air entry  Assessment     Karli Medico a(n) 65y o  year old male with  MDD with psychotic features    Medical Clearance: Patient is medically cleared for discharge possibly later this week  Medication list has been reconciled  Scripts have been filled out       1  Cardiac with history of hypertension: HCTZ and lisinopril  2  Newly diagnosed type 2 diabetes mellitus: Lantus insulin 18 units at bedtime along with metformin 500 mg 2 times daily  Accu-Cheks are 3 times daily along with low-dose sliding scale coverage   Patient's hemoglobin A1c was 11 3  Dietitian is on consult for evaluation and diabetic teaching for the patient  I have written scripts for metformin and Lantus, but I informed the patient to first follow up with his primary care physician for further management, as it may be more appropriate to increase the metformin and/or add another non-insulin medication  Alternatively, given the patient's high A1c, the PCP may also make the decision for continued Lantus therapy, in which case the patient will require education on proper administration and monitoring    3  Allergic rhinitis:  Loratadine  4  New vitamin B12 deficiency: Monthly B12 injections  5  New vitamin-D deficiency:  Vitamin-D bolus doses for 13 weeks followed by vitamin D3 1000 units daily  6  Dry Eyes  Artificial Tears as needed  7  DJD/osteoarthritis with chronic lower back pain and right knee pain:  Patient may continue to receive Tylenol on as needed basis for mild pain and naproxen 500mg every 12 hours as needed for moderate pain  The patient was discussed with Dr Yaya Aquino and he is in agreement with the above note

## 2018-09-28 LAB
GLUCOSE SERPL-MCNC: 138 MG/DL (ref 65–140)
GLUCOSE SERPL-MCNC: 152 MG/DL (ref 65–140)
GLUCOSE SERPL-MCNC: 161 MG/DL (ref 65–140)
GLUCOSE SERPL-MCNC: 182 MG/DL (ref 65–140)

## 2018-09-28 PROCEDURE — 82948 REAGENT STRIP/BLOOD GLUCOSE: CPT

## 2018-09-28 PROCEDURE — 99232 SBSQ HOSP IP/OBS MODERATE 35: CPT | Performed by: PSYCHIATRY & NEUROLOGY

## 2018-09-28 RX ADMIN — METFORMIN HYDROCHLORIDE 500 MG: 500 TABLET ORAL at 17:14

## 2018-09-28 RX ADMIN — LISINOPRIL 10 MG: 10 TABLET ORAL at 08:27

## 2018-09-28 RX ADMIN — SERTRALINE HYDROCHLORIDE 100 MG: 100 TABLET ORAL at 08:28

## 2018-09-28 RX ADMIN — TRAZODONE HYDROCHLORIDE 100 MG: 50 TABLET ORAL at 21:58

## 2018-09-28 RX ADMIN — HYDROCHLOROTHIAZIDE 12.5 MG: 12.5 CAPSULE ORAL at 08:28

## 2018-09-28 RX ADMIN — INSULIN GLARGINE 18 UNITS: 100 INJECTION, SOLUTION SUBCUTANEOUS at 21:57

## 2018-09-28 RX ADMIN — LORATADINE 10 MG: 10 TABLET ORAL at 08:26

## 2018-09-28 RX ADMIN — METFORMIN HYDROCHLORIDE 500 MG: 500 TABLET ORAL at 08:26

## 2018-09-28 NOTE — PROGRESS NOTES
Upon assessment patient was watching TV with peer and socializing, no noted distress  Affect flat, guarded, eye contact fair-poor as he would frequently look down when speaking  Patient denied anxiety, rated his depression at #5/10, denied SI/HI  Patient continues with VH, as he frequently seeks his  friend but considers this a comfort for him  Patient aware of medication orders, received PRN Trazodone for insomnia  No glycemic reactions  Remains on 7" checks for safety and behaviors

## 2018-09-28 NOTE — PROGRESS NOTES
Pt c/o depression 5/10 & anxiety 5/10  Pt denies any suicidal or homicidal ideations  Pt up ad gabriel & gait is steady  Pt does socialize with other patients  Q 7 min checks maintained to monitor pt's behavior & safety

## 2018-09-28 NOTE — PROGRESS NOTES
Alen#  :1960 Se Leon  CEY:[id number]    MRV:[id number]  Adm Date: 2018  5:56 PM   ATT PHY: Do Harley Wetzel     The patient was seen after reviewing the chart and discussing the case with caring staff  Today during our encounter, the patient reported no acute concerns other than continued right knee pain  Patient is likely scheduled for discharge Monday  He remains medically cleared for discharge  Medication list has been reconciled  Scripts have been filled out  Objective     Vitals:    18 08   BP: 122/74   Pulse:    Resp:    Temp:    SpO2:        General Appearance: Awake and Alert  No acute distress  HEENT: Normocephalic, atraumatic  PERRLA, EOMI, MMM  Heart: RRR, no murmurs  Normal S1 and S2   Lungs: CTA bilaterally with fair air entry  Assessment     Amanda franco(n) 65y o  year old male with MDD with psychotic features    Medical Clearance: Patient is medically cleared for discharge most likely Monday  Medication list has been reconciled  Scripts have been filled out       1  Cardiac with history of hypertension: HCTZ and lisinopril  2  Newly diagnosed type 2 diabetes mellitus: Lantus insulin 18 units at bedtime along with metformin 500 mg 2 times daily  Accu-Cheks are 3 times daily along with low-dose sliding scale coverage   Patient's hemoglobin A1c was 11 3  Dietitian is on consult for evaluation and diabetic teaching for the patient  I have written scripts for metformin and Lantus, but I informed the patient to first follow up with his primary care physician for further management, as it may be more appropriate to increase the metformin and/or add another non-insulin medication   Alternatively, given the patient's high A1c, the PCP may also make the decision for continued Lantus therapy, in which case the patient will require education on proper administration and monitoring  3  Allergic rhinitis:  Loratadine  4  New vitamin B12 deficiency: Monthly B12 injections  5  New vitamin-D deficiency:  Vitamin-D bolus doses for 13 weeks followed by vitamin D3 1000 units daily  6  Dry Eyes  Artificial Tears as needed  7  DJD/osteoarthritis with chronic lower back pain and right knee pain:  Patient may continue to receive Tylenol on as needed basis for mild pain  I will change the patient from Naproxen 500mg to Voltaren gel for the right knee pain  The patient was discussed with Dr Rm Obrien and he is in agreement with the above note

## 2018-09-28 NOTE — PROGRESS NOTES
Progress Note - Behavioral Health   Kelby Ventura 65 y o  male MRN: [id number]  Unit/Bed#: [facility] Encounter: [id number]    The patient was seen for continuing care and reviewed with treatment team     Mental Status Evaluation:  Appearance:  Adequate hygiene and grooming   Behavior:  calm and cooperative   Mood:  anxious and depressed   Affect: Flat   Speech: Rapid   Thought Process:  Goal directed and coherent   Thought Content:  Does not verbalize delusional material   Perceptual Disturbances: Auditory hallucinations without commands and Visual hallucinations   Risk Potential: No suicidal or homicidal ideation   Attention/Concentration attention span and concentration were age appropriate   Orientation:   Oriented x 3   Gait/Station: normal gait/station and normal balance   Motor Activity: No abnormal movement noted     Progress Toward Goals: Continues with PTSD symptoms, he sometimes "sees" hs  friend  Mood is still anxious and depressed, but feels somewhat improved  He is denying any suicidal thoughts  He is participating in groups and in his discharge planning  Requires PRN Trazodone for sleep  Appetite is 100%  Will increase Zoloft to 150 mg  Assessment/Plan    Principal Problem:    Current severe episode of major depressive disorder without psychotic features without prior episode ([facility] [address] )  Active Problems:    PTSD (post-traumatic stress disorder)    Anxiety      Recommended Treatment: Continue with pharmacotherapy, group therapy, milieu therapy and occupational therapy    The patient will be maintained on the following medications:    Current Facility-Administered Medications:  acetaminophen 325 mg Oral Q4H PRN Mendel Majestic Rockovits, PA-C   [START ON 2018] cholecalciferol 1,000 Units Oral Daily Matti Ramirez MD   cyanocobalamin 1,000 mcg Intramuscular Q30 Days Matti Ramirez MD   dextran 70-hypromellose 1 drop Both Eyes Q3H PRN Mendel Majestic Rockovits, PA-C   diclofenac sodium 2 g Topical 4x Daily PRN Waldemar Thomas PA-C   ergocalciferol 50,000 Units Oral Weekly Sarai Smith MD   lisinopril 10 mg Oral Daily Sarai Smith MD   And       hydrochlorothiazide 12 5 mg Oral Daily Sarai Smith MD   insulin glargine 18 Units Subcutaneous HS Sarai Smith MD   insulin lispro 1-6 Units Subcutaneous TID LAKSHMI Thomas PA-C   loratadine 10 mg Oral Daily Sarai Smith MD   LORazepam 1 mg Intramuscular Q4H PRN LASHELL Gorman   LORazepam 0 5 mg Oral Q4H PRN LASHELL Gorman   metFORMIN 500 mg Oral BID With Meals Sarai Smith MD   risperiDONE 0 5 mg Oral Q8H PRN LASHELL Gorman   [START ON 9/29/2018] sertraline 150 mg Oral Daily LASHELL Gorman   traZODone 100 mg Oral HS PRN LASHELL Gorman       Risks, benefits and possible side effects of Medications:   Risks, benefits, and possible side effects of medications explained to patient and patient verbalizes understanding

## 2018-09-28 NOTE — PLAN OF CARE
Problem: Ineffective Coping  Goal: Participates in unit activities  Interventions:  - Provide therapeutic environment   - Provide required programming   - Redirect inappropriate behaviors    Outcome: Progressing  Patient continues to attend and participate in groups daily Will encouraged continued behaviors to keep patient social with peers and staff

## 2018-09-28 NOTE — PLAN OF CARE
Problem: Anxiety  Goal: Anxiety is at manageable level  Interventions:  - Assess and monitor patient's anxiety level  - Monitor for signs and symptoms of anxiety both physical and emotional (heart palpitations, chest pain, shortness of breath, headaches, nausea, feeling jumpy, restlessness, irritable, apprehensive)  - Collaborate with interdisciplinary team and initiate plan and interventions as ordered    - [place] patient to unit/surroundings  - Explain treatment plan  - Encourage participation in care  - Encourage verbalization of concerns/fears  - Identify coping mechanisms  - Assist in developing anxiety-reducing skills  - Administer/offer alternative therapies  - Limit or eliminate stimulants   Outcome: Progressing

## 2018-09-29 LAB
GLUCOSE SERPL-MCNC: 122 MG/DL (ref 65–140)
GLUCOSE SERPL-MCNC: 132 MG/DL (ref 65–140)
GLUCOSE SERPL-MCNC: 195 MG/DL (ref 65–140)
GLUCOSE SERPL-MCNC: 216 MG/DL (ref 65–140)

## 2018-09-29 PROCEDURE — 99231 SBSQ HOSP IP/OBS SF/LOW 25: CPT | Performed by: PSYCHIATRY & NEUROLOGY

## 2018-09-29 PROCEDURE — 82948 REAGENT STRIP/BLOOD GLUCOSE: CPT

## 2018-09-29 RX ORDER — TRAZODONE HYDROCHLORIDE 150 MG/1
150 TABLET ORAL
Status: DISCONTINUED | OUTPATIENT
Start: 2018-09-29 | End: 2018-10-01 | Stop reason: HOSPADM

## 2018-09-29 RX ORDER — LORAZEPAM 0.5 MG/1
0.5 TABLET ORAL EVERY 6 HOURS PRN
Status: DISCONTINUED | OUTPATIENT
Start: 2018-09-29 | End: 2018-10-01 | Stop reason: HOSPADM

## 2018-09-29 RX ADMIN — SERTRALINE HYDROCHLORIDE 150 MG: 100 TABLET ORAL at 09:03

## 2018-09-29 RX ADMIN — METFORMIN HYDROCHLORIDE 500 MG: 500 TABLET ORAL at 16:40

## 2018-09-29 RX ADMIN — LORAZEPAM 0.5 MG: 0.5 TABLET ORAL at 21:46

## 2018-09-29 RX ADMIN — DICLOFENAC 2 G: 10 GEL TOPICAL at 09:13

## 2018-09-29 RX ADMIN — INSULIN GLARGINE 18 UNITS: 100 INJECTION, SOLUTION SUBCUTANEOUS at 21:05

## 2018-09-29 RX ADMIN — LORATADINE 10 MG: 10 TABLET ORAL at 09:05

## 2018-09-29 RX ADMIN — METFORMIN HYDROCHLORIDE 500 MG: 500 TABLET ORAL at 07:55

## 2018-09-29 RX ADMIN — TRAZODONE HYDROCHLORIDE 150 MG: 150 TABLET ORAL at 21:04

## 2018-09-29 NOTE — PLAN OF CARE
Alteration in Thoughts and Perception     Treatment Goal: Gain control of psychotic behaviors/thinking, reduce/eliminate presenting symptoms and demonstrate improved reality functioning upon discharge Progressing     Verbalize thoughts and feelings Progressing     Refrain from acting on delusional thinking/internal stimuli Progressing     Agree to be compliant with medication regime, as prescribed and report medication side effects Progressing     Attend and participate in unit activities, including therapeutic, recreational, and educational groups Progressing     Recognize dysfunctional thoughts, communicate reality-based thoughts at the time of discharge Progressing     Complete daily ADLs, including personal hygiene independently, as able Progressing        Anxiety     Anxiety is at manageable level Progressing        Depression     Treatment Goal: Demonstrate behavioral control of depressive symptoms, verbalize feelings of improved mood/affect, and adopt new coping skills prior to discharge Progressing     Verbalize thoughts and feelings Progressing     Refrain from harming self Progressing     Refrain from isolation Progressing     Refrain from self-neglect Progressing     Attend and participate in unit activities, including therapeutic, recreational, and educational groups Progressing     Complete daily ADLs, including personal hygiene independently, as able [address] Discharge to post-acute care or home with appropriate resources Progressing        Individualized Interventions     Patient will verbalize appropriate use of telephone within 5 days Progressing     Patient will verbalize need for hospitalization and will no longer attempt elopement within 5 days Progressing     Patient will recognize inappropriate behaviors and develop alternative behaviors within 5 days Progressing        Ineffective Coping     Cooperates with admission process Progressing     Identifies ineffective coping skills Progressing     Identifies healthy coping skills Progressing     Demonstrates healthy coping skills Progressing     Participates in unit activities Progressing     Patient/Family participate in treatment and DC plans Progressing     Patient/Family verbalizes awareness of resources Progressing     Understands least restrictive measures Progressing     Free from restraint events Progressing        Nutrition/Hydration-ADULT     Nutrient/Hydration intake appropriate for improving, restoring or maintaining nutritional needs Progressing        Risk for Self Injury/Neglect     Treatment Goal: Remain safe during length of stay, learn and adopt new coping skills, and be free of self-injurious ideation, impulses and acts at the time of discharge Progressing     Verbalize thoughts and feelings Progressing     Refrain from harming self Progressing     Attend and participate in unit activities, including therapeutic, recreational, and educational groups Progressing     Recognize maladaptive responses and adopt new coping mechanisms Progressing     Complete daily ADLs, including personal hygiene independently, as able Progressing

## 2018-09-29 NOTE — NURSING NOTE
[phone]  Pt found to be sleeping on most of authors rounds  No acute behavioral issues noted  Q 7 min checks maintained  Falling star protocol in place

## 2018-09-29 NOTE — PROGRESS NOTES
No change of behaviors, pt remains calm, cooperative, denies SI and feels that depression and anxiety, "is better", pt is capable of making needs known and offers no complaints at this time

## 2018-09-29 NOTE — PROGRESS NOTES
Progress Note - Behavioral Health   Rosa Jane 65 y o  male MRN: @MRN   Unit/Bed#Georgie Lund 361-96 Encounter: [id number]        The patient was seen for continuing care and reviewed with staff  Report from staff regarding this patient received and discussed, and records reviewed prior to seeing this patient   The patient the was moderately depressed but not suicidal   The adherent to treatment and feeling that he is doing better    Sleep is "so-so"  Appetite     Medication side effects:no complaints  ROS: No exept some pain in left knee, chronic    Mental Status Evaluation:    Appearance:  dressed appropriately, casually dressed   Behavior:  cooperative, calm   Mood:  improved, depressed, anxious   Affect: normal range and intensity, appropriate, reactive    Speech:  normal rate and volume, normal pitch, fluent   Language: appropriate   Thought Process:  concrete   Associations: concrete associations   Thought Content:  negative thinking   Perceptual Disturbances: no auditory hallucinations, no visual hallucinations, denies auditory hallucinations when asked, does not appear responding to internal stimuli   Risk Potential: Suicidal ideation - None, contracts for safety on the unit  Homicidal ideation - None  Potential for aggression - No   Sensorium:  oriented to person, place and time   Memory:  recent and remote memory grossly intact   Consciousness:  alert and awake   Attention: decreased concentration   Fund of Knowledge: awareness of current events appropriate   Insight:  improving   Judgment: improving   Muscle Tone: normal   Gait/Station: normal gait/station and normal balance   Motor Activity: no abnormal movements         Laboratory results:  I have personally reviewed all pertinent laboratory results  No results for input(s): HGBA1C, NA, K, CL, CO2, GLUCOSE, CREATININE, BUN, MG, PHOS in the last 72 hours      Invalid input(s): CA  No results for input(s): WBC, RBC, HGB, HCT, MCV, MCH, RDW, PLT in the last 72 hours  No results for input(s): CREATININE, BUN, NA, K, CL, CO2, GLUCOSE, PROT, ALT, AST, BILIDIR in the last 72 hours  Invalid input(s): CA, AKLPHOS  No results for input(s): ALKPHOS, AST, ALT, GGT, BILITOT, BILIDIR, ALBUMIN, INR, AMYLASE, LIPASE in the last 72 hours  No results for input(s): TROPONINI, CKMB, CKTOTAL in the last 72 hours  Invalid input(s): PBNP  No results for input(s): CHOL, LDLDIRECT, HDL, TRIG in the last 72 hours  No results for input(s): CRP, SEDRATE, WILMAN, HAV, HEPAIGM, HEPBIGM, HEPBCAB, HEPCAB in the last 72 hours  Invalid input(s): HEAG    CBC: No results for input(s): WBC, RBC, HGB, HCT, PLT in the last 72 hours  BMP: No results for input(s): NA, K, CL, CO2, BUN, GLU in the last 72 hours  Invalid input(s): CREA        Progress Toward Goals: improving slowly    Assessment/Plan   Principal Problem:    Current severe episode of major depressive disorder without psychotic features without prior episode (HCC)  Active Problems:    PTSD (post-traumatic stress disorder)    Anxiety      Recommended Treatment:   Continue the same medications because the patient continued to slowly improved  Supportive therapy was provided patient was receptive  Was not suicidal today looking forward his treatment and improvement  Planned medication and treatment changes: All current active medications have been reviewed  Continue treatment with group therapy, milieu therapy, occupational therapy and medication management  Risks / Benefits of Treatment:    Risks, benefits, and possible side effects of medications explained to patient and patient verbalizes understanding and agreement for treatment  Counseling / Coordination of Care:    Patient's progress reviewed with nursing staff  ** Please Note: This note has been constructed using a voice recognition system   **

## 2018-09-29 NOTE — PROGRESS NOTES
[facility]  [facility]1960 Sanju Caro  ANAND:[id number]    KOX:[id number]  Adm Date: 2018  5:56 PM   ATT PHY: Sandeep Del Valle, Do Harley Ly     The patient was seen after reviewing the chart and discussing the case with caring staff  Today during our encounter, the patient reported no acute concerns other than continued right knee pain  Patient is likely scheduled for discharge Monday  He remains medically cleared for discharge  Medication list has been reconciled  Scripts have been filled out  Objective     Vitals:    18 06   BP: 106/67   Pulse: 76   Resp: 18   Temp: 97 6 °F (36 4 °C)   SpO2: 94%       General Appearance: Awake and Alert  No acute distress  HEENT: Normocephalic, atraumatic  PERRLA, EOMI, MMM  Heart: RRR, no murmurs  Normal S1 and S2   Lungs: CTA bilaterally with fair air entry  Assessment     Petr franco(n) 65y o  year old male with MDD with psychotic features    Medical Clearance: Patient is medically cleared for discharge most likely Monday  Medication list has been reconciled  Scripts have been filled out       1  Cardiac with history of hypertension: HCTZ and lisinopril  2  Newly diagnosed type 2 diabetes mellitus: Lantus insulin 18 units at bedtime along with metformin 500 mg 2 times daily  Accu-Cheks are 3 times daily along with low-dose sliding scale coverage   Patient's hemoglobin A1c was 11 3  Dietitian is on consult for evaluation and diabetic teaching for the patient  I have written scripts for metformin and Lantus, but I informed the patient to first follow up with his primary care physician for further management, as it may be more appropriate to increase the metformin and/or add another non-insulin medication   Alternatively, given the patient's high A1c, the PCP may also make the decision for continued Lantus therapy, in which case the patient will require education on proper administration and monitoring  3  Allergic rhinitis:  Loratadine  4  New vitamin B12 deficiency: Monthly B12 injections  5  New vitamin-D deficiency:  Vitamin-D bolus doses for 13 weeks followed by vitamin D3 1000 units daily  6  Dry Eyes  Artificial Tears as needed  7  DJD/osteoarthritis with chronic lower back pain and right knee pain:  Patient may continue to receive Tylenol on as needed basis for mild pain  I will change the patient from Naproxen 500mg to Voltaren gel for the right knee pain

## 2018-09-29 NOTE — PROGRESS NOTES
The pt has been visible and interactive in the milieu with select peers, pt has been calm, cooperative and compliant with medications, when asked concerning depression and anxiety pt stated, "it's better", appeared guarded, reluctant to rate or discuss topic further, the pt denied current suicidal ideation and verbally contracted for safety, pt denies A/VH, pt is appropriate in conversation with fair eye contact, pt is capable of making needs know and offers no complaints at this time, will continue to monitor for safety and for change of status

## 2018-09-29 NOTE — NURSING NOTE
Pt present in the milieu  Social with select peers  Affect bright on approach  Reports ongoing depression and anxiety does report some improvement; no acute behavioral issues noted  Q 15 min checks maintained  Falling star protocol in place

## 2018-09-30 LAB
GLUCOSE SERPL-MCNC: 129 MG/DL (ref 65–140)
GLUCOSE SERPL-MCNC: 134 MG/DL (ref 65–140)
GLUCOSE SERPL-MCNC: 172 MG/DL (ref 65–140)
GLUCOSE SERPL-MCNC: 176 MG/DL (ref 65–140)
GLUCOSE SERPL-MCNC: 209 MG/DL (ref 65–140)

## 2018-09-30 PROCEDURE — 82948 REAGENT STRIP/BLOOD GLUCOSE: CPT

## 2018-09-30 PROCEDURE — 99231 SBSQ HOSP IP/OBS SF/LOW 25: CPT | Performed by: PSYCHIATRY & NEUROLOGY

## 2018-09-30 RX ADMIN — DICLOFENAC 2 G: 10 GEL TOPICAL at 09:56

## 2018-09-30 RX ADMIN — LORAZEPAM 0.5 MG: 0.5 TABLET ORAL at 21:23

## 2018-09-30 RX ADMIN — HYDROCHLOROTHIAZIDE 12.5 MG: 12.5 CAPSULE ORAL at 08:25

## 2018-09-30 RX ADMIN — LISINOPRIL 10 MG: 10 TABLET ORAL at 08:25

## 2018-09-30 RX ADMIN — METFORMIN HYDROCHLORIDE 500 MG: 500 TABLET ORAL at 08:25

## 2018-09-30 RX ADMIN — INSULIN GLARGINE 18 UNITS: 100 INJECTION, SOLUTION SUBCUTANEOUS at 21:21

## 2018-09-30 RX ADMIN — LORATADINE 10 MG: 10 TABLET ORAL at 08:25

## 2018-09-30 RX ADMIN — SERTRALINE HYDROCHLORIDE 150 MG: 100 TABLET ORAL at 08:24

## 2018-09-30 RX ADMIN — METFORMIN HYDROCHLORIDE 500 MG: 500 TABLET ORAL at 16:58
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
## 2018-09-30 NOTE — NURSING NOTE
[id number]     Pt pleasant and cooperative this shift no acute changes in mood; thought process or content  No behavioral issues noted  Q 7 min checks maintained

## 2018-09-30 NOTE — PLAN OF CARE
Alteration in Thoughts and Perception     Treatment Goal: Gain control of psychotic behaviors/thinking, reduce/eliminate presenting symptoms and demonstrate improved reality functioning upon discharge Progressing     Verbalize thoughts and feelings Progressing     Refrain from acting on delusional thinking/internal stimuli Progressing     Agree to be compliant with medication regime, as prescribed and report medication side effects Progressing     Attend and participate in unit activities, including therapeutic, recreational, and educational groups Progressing     Recognize dysfunctional thoughts, communicate reality-based thoughts at the time of discharge Progressing     Complete daily ADLs, including personal hygiene independently, as able Progressing        Anxiety     Anxiety is at manageable level Progressing        Depression     Treatment Goal: Demonstrate behavioral control of depressive symptoms, verbalize feelings of improved mood/affect, and adopt new coping skills prior to discharge Progressing     Verbalize thoughts and feelings Progressing     Refrain from harming self Progressing     Refrain from isolation Progressing     Refrain from self-neglect Progressing     Attend and participate in unit activities, including therapeutic, recreational, and educational groups Progressing     Complete daily ADLs, including personal hygiene independently, as able [address] Discharge to post-acute care or home with appropriate resources Progressing        Individualized Interventions     Patient will verbalize appropriate use of telephone within 5 days Progressing     Patient will verbalize need for hospitalization and will no longer attempt elopement within 5 days Progressing     Patient will recognize inappropriate behaviors and develop alternative behaviors within 5 days Progressing        Ineffective Coping     Cooperates with admission process Progressing     Identifies ineffective coping skills Progressing     Identifies healthy coping skills Progressing     Demonstrates healthy coping skills Progressing     Participates in unit activities Progressing     Patient/Family participate in treatment and DC plans Progressing     Patient/Family verbalizes awareness of resources Progressing     Understands least restrictive measures Progressing     Free from restraint events Progressing        Nutrition/Hydration-ADULT     Nutrient/Hydration intake appropriate for improving, restoring or maintaining nutritional needs Progressing        Risk for Self Injury/Neglect     Treatment Goal: Remain safe during length of stay, learn and adopt new coping skills, and be free of self-injurious ideation, impulses and acts at the time of discharge Progressing     Verbalize thoughts and feelings Progressing     Refrain from harming self Progressing     Attend and participate in unit activities, including therapeutic, recreational, and educational groups Progressing     Recognize maladaptive responses and adopt new coping mechanisms Progressing     Complete daily ADLs, including personal hygiene independently, as able Progressing

## 2018-09-30 NOTE — NURSING NOTE
[phone]  Pt found to be sleeping on most of authors rounds  No acute behavioral issues noted  Q 7 min checks maintained  Falling star protocol in place

## 2018-09-30 NOTE — PROGRESS NOTES
Patient in dining room finishing breakfast  Patient states the ativan worked last night and he was able to sleep through the night  C/o right knee pain #6-7 and chronic  Denies need for pain medication at this time  Rates depression at 4-5 and anxiety 5-6  Denies SI/HI  Q 7 minute checks maintained as well as fall precautions maintained

## 2018-09-30 NOTE — PROGRESS NOTES
Alen,#  :1960 Dave Mejia  VGU:[id number]    TCF:[id number]  Adm Date: 2018  5:56 PM   ATT PHY: Do Harley Patel     The patient was seen after reviewing the chart and discussing the case with caring staff  Today during our encounter, the patient reported no acute concerns other than continued right knee pain  Patient is likely scheduled for discharge Monday  He remains medically cleared for discharge  Medication list has been reconciled  Scripts have been filled out  Objective     Vitals:    18 0628   BP: 120/78   Pulse: 77   Resp: 18   Temp: 98 °F (36 7 °C)   SpO2: 96%       General Appearance: Awake and Alert  No acute distress  HEENT: Normocephalic, atraumatic  PERRLA, EOMI, MMM  Heart: RRR, no murmurs  Normal S1 and S2   Lungs: CTA bilaterally with fair air entry  Assessment     Josefina franco(n) 65y o  year old male with MDD with psychotic features    Medical Clearance: Patient is medically cleared for discharge most likely Monday  Medication list has been reconciled  Scripts have been filled out       1  Cardiac with history of hypertension: HCTZ and lisinopril  2  Newly diagnosed type 2 diabetes mellitus: Lantus insulin 18 units at bedtime along with metformin 500 mg 2 times daily  Accu-Cheks are 3 times daily along with low-dose sliding scale coverage   Patient's hemoglobin A1c was 11 3  Dietitian is on consult for evaluation and diabetic teaching for the patient  I have written scripts for metformin and Lantus, but I informed the patient to first follow up with his primary care physician for further management, as it may be more appropriate to increase the metformin and/or add another non-insulin medication   Alternatively, given the patient's high A1c, the PCP may also make the decision for continued Lantus therapy, in which case the patient will require education on proper administration and monitoring  3  Allergic rhinitis:  Loratadine  4  New vitamin B12 deficiency: Monthly B12 injections  5  New vitamin-D deficiency:  Vitamin-D bolus doses for 13 weeks followed by vitamin D3 1000 units daily  6  Dry Eyes  Artificial Tears as needed  7  DJD/osteoarthritis with chronic lower back pain and right knee pain:  Patient may continue to receive Tylenol on as needed basis for mild pain  I will change the patient from Naproxen 500mg to Voltaren gel for the right knee pain

## 2018-09-30 NOTE — PROGRESS NOTES
Pt currently sitting in small TV room watching football with his peers  Q 7 minute checks maintained  Continue to monitor

## 2018-09-30 NOTE — PROGRESS NOTES
Progress Note - Behavioral Health   Alyson Patient 65 y o  male MRN: @MRN   Unit/Bed#Audry Skiff 953-98 Encounter: [id number]        The patient was seen for continuing care and reviewed with staff  Report from staff regarding this patient received and discussed, and records reviewed prior to seeing this patient   A little bit of flashback, better mood  No Suicidal thoughts  Sleep improved  Sleep is good last night  Appetite is better    Medication side effects:no complaints  ROS: No     Mental Status Evaluation:    Appearance:  dressed appropriately, casually dressed   Behavior:  cooperative, calm   Mood:  improved, depressed, anxious   Affect: normal range and intensity, appropriate, reactive    Speech:  normal rate and volume, normal pitch   Language: appropriate   Thought Process:  normal   Associations: concrete associations   Thought Content:  normal   Perceptual Disturbances: denies auditory hallucinations when asked, does not appear responding to internal stimuli   Risk Potential: Suicidal ideation - None, contracts for safety on the unit  Homicidal ideation - None  Potential for aggression - No   Sensorium:  oriented to person, place and time   Memory:  recent and remote memory grossly intact   Consciousness:  alert and awake   Attention: attention span and concentration are normal   Fund of Knowledge: awareness of current events appropriate   Insight:  improved   Judgment: improved   Muscle Tone: normal   Gait/Station: normal gait/station and normal balance   Motor Activity: no abnormal movements         Laboratory results:  I have personally reviewed all pertinent laboratory results      Progress Toward Goals: improving progressively    Assessment/Plan   Principal Problem:    Current severe episode of major depressive disorder without psychotic features without prior episode ([facility] [address] )  Active Problems:    PTSD (post-traumatic stress disorder)    Anxiety      Recommended Treatment: will continue present medications  Pt condition significantly improved  No suicidal thoughts  Symptoms of PTSD are under better control  Ready for his discharge and continuation of treatment at [place]  Planned medication and treatment changes: All current active medications have been reviewed  Continue treatment with group therapy, milieu therapy, occupational therapy and medication management  Risks / Benefits of Treatment:    Risks, benefits, and possible side effects of medications explained to patient and patient verbalizes understanding and agreement for treatment  Counseling / Coordination of Care:    Patient's progress reviewed with nursing staff  ** Please Note: This note has been constructed using a voice recognition system   **

## 2018-10-01 VITALS
RESPIRATION RATE: 18 BRPM | SYSTOLIC BLOOD PRESSURE: 130 MMHG | WEIGHT: 165.4 LBS | DIASTOLIC BLOOD PRESSURE: 82 MMHG | TEMPERATURE: 98.3 F | OXYGEN SATURATION: 98 % | HEIGHT: 66 IN | HEART RATE: 99 BPM | BODY MASS INDEX: 26.58 KG/M2

## 2018-10-01 LAB
GLUCOSE SERPL-MCNC: 119 MG/DL (ref 65–140)
GLUCOSE SERPL-MCNC: 139 MG/DL (ref 65–140)
GLUCOSE SERPL-MCNC: 159 MG/DL (ref 65–140)

## 2018-10-01 PROCEDURE — 99239 HOSP IP/OBS DSCHRG MGMT >30: CPT | Performed by: PSYCHIATRY & NEUROLOGY

## 2018-10-01 PROCEDURE — 82948 REAGENT STRIP/BLOOD GLUCOSE: CPT

## 2018-10-01 RX ORDER — TRAZODONE HYDROCHLORIDE 150 MG/1
150 TABLET ORAL
Qty: 30 TABLET | Refills: 0 | Status: SHIPPED | OUTPATIENT
Start: 2018-10-01 | End: 2021-01-01 | Stop reason: HOSPADM

## 2018-10-01 RX ADMIN — METFORMIN HYDROCHLORIDE 500 MG: 500 TABLET ORAL at 16:21

## 2018-10-01 RX ADMIN — LISINOPRIL 10 MG: 10 TABLET ORAL at 08:18

## 2018-10-01 RX ADMIN — DICLOFENAC 2 G: 10 GEL TOPICAL at 12:51

## 2018-10-01 RX ADMIN — HYDROCHLOROTHIAZIDE 12.5 MG: 12.5 CAPSULE ORAL at 08:18

## 2018-10-01 RX ADMIN — SERTRALINE HYDROCHLORIDE 150 MG: 100 TABLET ORAL at 08:18

## 2018-10-01 RX ADMIN — LORATADINE 10 MG: 10 TABLET ORAL at 08:18

## 2018-10-01 RX ADMIN — METFORMIN HYDROCHLORIDE 500 MG: 500 TABLET ORAL at 08:18

## 2018-10-01 NOTE — PLAN OF CARE
Alteration in Thoughts and Perception     Treatment Goal: Gain control of psychotic behaviors/thinking, reduce/eliminate presenting symptoms and demonstrate improved reality functioning upon discharge Adequate for Discharge     Verbalize thoughts and feelings Adequate for Discharge     Refrain from acting on delusional thinking/internal stimuli Adequate for Discharge     Agree to be compliant with medication regime, as prescribed and report medication side effects Adequate for Discharge     Attend and participate in unit activities, including therapeutic, recreational, and educational groups Adequate for Discharge     Recognize dysfunctional thoughts, communicate reality-based thoughts at the time of discharge Adequate for Discharge     Complete daily ADLs, including personal hygiene independently, as able Adequate for Discharge        Anxiety     Anxiety is at manageable level Adequate for Discharge        Depression     Treatment Goal: Demonstrate behavioral control of depressive symptoms, verbalize feelings of improved mood/affect, and adopt new coping skills prior to discharge Adequate for Discharge     Verbalize thoughts and feelings Adequate for Discharge     Refrain from harming self Adequate for Discharge     Refrain from isolation Adequate for Discharge     Refrain from self-neglect Adequate for Discharge     Attend and participate in unit activities, including therapeutic, recreational, and educational groups Adequate for Discharge     Complete daily ADLs, including personal hygiene independently, as able Adequate for Discharge        DISCHARGE PLANNING - CARE MANAGEMENT     Discharge to post-acute care or home with appropriate resources Adequate for Discharge        Individualized Interventions     Patient will verbalize appropriate use of telephone within 5 days Adequate for Discharge     Patient will verbalize need for hospitalization and will no longer attempt elopement within 5 days Adequate for Discharge     Patient will recognize inappropriate behaviors and develop alternative behaviors within 5 days Adequate for Discharge        Ineffective Coping     Cooperates with admission process Adequate for Discharge     Identifies ineffective coping skills Adequate for Discharge     Identifies healthy coping skills Adequate for Discharge     Demonstrates healthy coping skills Adequate for Discharge     Participates in unit activities Adequate for Discharge     Patient/Family participate in treatment and DC plans Adequate for Discharge     Patient/Family verbalizes awareness of resources Adequate for Discharge     Understands least restrictive measures Adequate for Discharge     Free from restraint events Adequate for Discharge        Nutrition/Hydration-ADULT     Nutrient/Hydration intake appropriate for improving, restoring or maintaining nutritional needs Adequate for Discharge        Risk for Self Injury/Neglect     Treatment Goal: Remain safe during length of stay, learn and adopt new coping skills, and be free of self-injurious ideation, impulses and acts at the time of discharge Adequate for Discharge     Verbalize thoughts and feelings Adequate for Discharge     Refrain from harming self Adequate for Discharge     Attend and participate in unit activities, including therapeutic, recreational, and educational groups Adequate for Discharge     Recognize maladaptive responses and adopt new coping mechanisms Adequate for Discharge     Complete daily ADLs, including personal hygiene independently, as able Adequate for Discharge

## 2018-10-01 NOTE — DISCHARGE INSTRUCTIONS
Sertraline (By mouth)   Sertraline (SER-tra-tj)  Treats depression, obsessive-compulsive disorder (OCD), posttraumatic stress disorder (PTSD), premenstrual dysphoric disorder (PMDD), social anxiety disorder, and panic disorder  This medicine is an SSRI  Brand Name(s): Zoloft   There may be other brand names for this medicine  When This Medicine Should Not Be Used: This medicine is not right for everyone  Do not use it if you had an allergic reaction to sertraline  How to Use This Medicine:   Liquid, Tablet  · Take your medicine as directed  Your dose may need to be changed several times to find what works best for you  You may need to take it for a few weeks or months before you feel better  · Oral liquid: Use the dropper provided to remove the medicine and mix it with 1/2 cup (4 ounces) of water, ginger ale, lemon-lime soda, lemonade, or orange juice  Drink the mixture right away  It is normal for it to look a bit hazy  · This medicine should come with a Medication Guide  Ask your pharmacist for a copy if you do not have one  · Missed dose: Take a dose as soon as you remember  If it is almost time for your next dose, wait until then and take a regular dose  Do not take extra medicine to make up for a missed dose  · Store the medicine in a closed container at room temperature, away from heat, moisture, and direct light  Drugs and Foods to Avoid:   Ask your doctor or pharmacist before using any other medicine, including over-the-counter medicines, vitamins, and herbal products  · Do not use this medicine together with pimozide  Do not use this medicine and an MAO inhibitor (MAOI) within 14 days of each other  Do not use the oral liquid form of sertraline if you are also using disulfiram   · Some medicines can affect how sertraline works   Tell your doctor if you are using the following:   ¨ Buspirone, cimetidine, cisapride, diazepam, digitoxin, fentanyl, flecainide, lithium, phenytoin, propafenone, Luis Fernando's wort, tramadol, tryptophan supplements, or valproate  ¨ A blood thinner (such as warfarin), a diuretic (water pill), an NSAID pain or arthritis medicine (such as aspirin, diclofenac, ibuprofen), a tricyclic antidepressant, a triptan medicine for migraine headaches  · Do not drink alcohol while you are using this medicine  Warnings While Using This Medicine:   · Tell your doctor if you are pregnant or breastfeeding, or if you have liver disease, bleeding problems, glaucoma, heart disease, or a seizure disorder  · For some children, teenagers, and young adults, this medicine may increase mental or emotional problems  This may lead to thoughts of suicide and violence  Talk with your doctor right away if you have any thoughts or behavior changes that concern you  Tell your doctor if you or anyone in your family has a history of bipolar disorder or suicide attempts  · This medicine may cause the following problems:   ¨ Serotonin syndrome (when taken with certain medicines)  ¨ Low sodium levels (more common in elderly patients and those who take diuretics or become dehydrated)  · Tell your doctor if you are sensitive to latex, because the oral liquid comes with a latex rubber dropper  · This medicine may make you dizzy or drowsy  Do not drive or do anything that could be dangerous until you know how this medicine affects you  · Do not stop using this medicine suddenly  Your doctor will need to slowly decrease your dose before you stop it completely  · Your doctor will check your progress and the effects of this medicine at regular visits  Keep all appointments  · Keep all medicine out of the reach of children  Never share your medicine with anyone    Possible Side Effects While Using This Medicine:   Call your doctor right away if you notice any of these side effects:  · Allergic reaction: Itching or hives, swelling in your face or hands, swelling or tingling in your mouth or throat, chest tightness, trouble breathing  · Anxiety, restlessness, fast heartbeat, fever, sweating, muscle spasms, twitching, nausea, vomiting, diarrhea, seeing or hearing things that are not there  · Blistering, peeling, or red skin rash  · Confusion, weakness, and muscle twitching  · Eye pain, vision changes, seeing halos around lights  · Feeling more excited or energetic than usual  · Thoughts of hurting yourself or others, unusual behavior  · Unusual bleeding or bruising  If you notice these less serious side effects, talk with your doctor:   · Dry mouth  · Loss of appetite, weight loss  · Mild diarrhea, constipation, nausea, vomiting  · Sexual problems  · Sleepiness, or trouble sleeping  If you notice other side effects that you think are caused by this medicine, tell your doctor  Call your doctor for medical advice about side effects  You may report side effects to FDA at 5-843-FDA-7115  © 2017 [address] [employer] Information is for End User's use only and may not be sold, redistributed or otherwise used for commercial purposes  The above information is an  only  It is not intended as medical advice for individual conditions or treatments  Talk to your doctor, nurse or pharmacist before following any medical regimen to see if it is safe and effective for you  Depression   WHAT YOU NEED TO KNOW:   What is depression? Depression is a medical condition that causes feelings of sadness or hopelessness that do not go away  Depression may cause you to lose interest in things you used to enjoy  These feelings may interfere with your daily life  What causes or increases my risk for depression? Depression may be caused by changes in brain chemicals that affect your mood   Your risk for depression may be higher if you have any of the following:  · Stressful events such as the death of a loved one, unemployment, childhood trauma, divorce, or domestic abuse    · A chronic medical condition such as diabetes, heart disease, or cancer    · Parents, siblings, or other family members with a history of depression    · Drug or alcohol abuse  What are the signs and symptoms of depression? · Appetite changes, or weight gain or loss    · Trouble going to sleep or staying asleep, or sleeping too much    · Fatigue or lack of energy    · Feeling restless, irritable, or withdrawn    · Feeling worthless, hopeless, discouraged, or guilty    · Trouble concentrating, remembering things, doing daily tasks, or making decisions    · Thoughts about hurting or killing yourself  How is depression diagnosed? Your healthcare provider will ask about your symptoms and how long you have had them  He or she will ask if you have any family members with depression  Tell your healthcare provider about any stressful events in your life  He or she may ask about any other health conditions or medicines you take  How is depression treated? · Therapy  may be used to treat your depression  A therapist will help you learn to cope with your thoughts and feelings  This can be done alone or in a group  It may also be done with family members or a significant other  · Antidepressant medicine  may be given to improve or balance your mood  You may need to take this medicine for several weeks before you begin to feel better  Tell your healthcare provider about any side effects or problems you have with your medicine  The type or amount of medicine may need to be changed  How can I manage depression? · Get regular physical activity  Try to exercise for 30 minutes, 3 to 5 days a week  Work with your healthcare provider to develop an exercise plan that you enjoy  Physical activity may improve your symptoms  · Get enough sleep  Create a routine to help you relax before bed  You can listen to music, read, or do yoga  Try to go to bed and wake up at the same time every day  Sleep is important for emotional health       · Eat a variety of healthy foods from all of the food groups  A healthy meal plan is low in fat, salt, and added sugar  Ask your healthcare provider for more information about a meal plan that is right for you  · Do not drink alcohol or use drugs  Alcohol and drugs can make your symptoms worse  Call 911 for any of the following:   · You think about harming yourself or someone else  When should I contact my healthcare provider? · Your symptoms do not improve  · You cannot make it to your next appointment  · You have new symptoms  · You have questions or concerns about your condition or care  CARE AGREEMENT:   You have the right to help plan your care  Learn about your health condition and how it may be treated  Discuss treatment options with your caregivers to decide what care you want to receive  You always have the right to refuse treatment  The above information is an  only  It is not intended as medical advice for individual conditions or treatments  Talk to your doctor, nurse or pharmacist before following any medical regimen to see if it is safe and effective for you  © 2017 [address] [facility] Information is for End User's use only and may not be sold, redistributed or otherwise used for commercial purposes  All illustrations and images included in CareNotes® are the copyrighted property of A D A [employer]  or Lino Cisneros  Metabolic Syndrome X   WHAT YOU NEED TO KNOW:   What is metabolic syndrome? Metabolic syndrome is a group of medical conditions that can increase your risk for heart disease, stroke, or type 2 diabetes  You may have metabolic syndrome if you have at least 3 of the following medical conditions:  · High triglycerides (a type of fat in your blood)    · Low HDL cholesterol (good cholesterol)    · High blood pressure    · High blood sugar levels    · Extra abdominal fat  What increases my risk for metabolic syndrome?   The exact cause of metabolic syndrome is not known  Your risk for metabolic syndrome increases if you have insulin resistance  Insulin is a hormone that helps your body take sugar out of your blood and use it for energy  Insulin resistance means your pancreas keeps making insulin but your body cannot use it correctly  Your risk for metabolic syndrome also increases as you age, if you are overweight or obese, or you do not exercise  What are the signs and symptoms of metabolic syndrome? Most people with metabolic syndrome do not have any signs or symptoms  You may have more thirst or hunger than usual, urinate more often, or have blurred vision or headaches  How is metabolic syndrome diagnosed? Your healthcare provider will examine you and ask about other medical conditions you may have  He may ask if you have any family members with metabolic syndrome, diabetes, obesity, or heart disease  · Blood tests: These are used to check your glucose and cholesterol levels  · Oral glucose tolerance test:  Your blood sugar level is tested after you fast for 8 hours, then again after you are given a glucose drink  The test measures how high your blood sugar level rises from the glucose drink  How is metabolic syndrome treated? · Cholesterol medicine: This type of medicine is given to help decrease (lower) the amount of cholesterol (fat) in your blood  Cholesterol medicine works best if you also exercise and eat a healthy diet that is low in certain kinds of fats  Some cholesterol medicines may cause liver problems  You may need to have blood taken for tests while using this medicine  · Blood pressure medicine: This is given to lower your blood pressure  A controlled blood pressure helps protect your organs, such as your heart, lungs, brain, and kidneys  Take your blood pressure medicine exactly as directed  · Hypoglycemic medicine: This medicine may be given to decrease the amount of sugar in your blood   Hypoglycemic medicine helps your body move the sugar to your cells, where it is needed for energy  What are the risks of metabolic syndrome? If untreated, metabolic syndrome increases your risk of heart disease, stroke, and diabetes  These conditions lead to life-threatening illness  How can I manage metabolic syndrome? · Maintain a healthy weight:  Weight loss helps lower cholesterol, triglycerides, blood pressure, and blood glucose levels  It can also raise HDL (good cholesterol)  Ask your healthcare provider how much you should weigh  Ask him to help you create a weight loss plan if you are overweight  · Eat a variety of healthy foods:  Healthy foods include fruits, vegetables, whole-grain breads, low-fat dairy products, beans, lean meats, and fish  Eat foods that are low in fat and sodium (salt)  A dietitian can help you plan healthy meals  · Exercise:  Ask your healthcare provider to help you create an exercise plan  Exercise can help lower your blood pressure, cholesterol, and blood sugar levels  Exercise can also help raise your HDL level and help you to lose weight  Get at least 30 minutes of exercise, 5 days each week  · Check your blood pressure as directed: You may be asked to keep a record of your blood pressure and bring it with you to follow-up visits  Ask your healthcare provider what your blood pressure should be and how to check it  · Limit alcohol:  Women should limit alcohol to 1 drink a day  Men should limit alcohol to 2 drinks a day  A drink of alcohol is 12 ounces of beer, 5 ounces of wine, or 1½ ounces of liquor  · Do not smoke: If you smoke, it is never too late to quit  Smoking further increases your risk for heart disease and stroke  Ask your healthcare provider for information if you need help quitting  When should I contact my healthcare provider? · You have more thirst or hunger than usual      · You urinate more frequently  · You have blurred vision      · You have questions or concerns about your condition or care  When should I seek immediate care or call 911? · Your blood pressure is higher than your healthcare provider told you it should be  · You have chest pain or discomfort that spreads to your arms, jaw, or back  · You have a severe headache or dizziness  · You have trouble thinking, speaking, or understanding others  CARE AGREEMENT:   You have the right to help plan your care  Learn about your health condition and how it may be treated  Discuss treatment options with your caregivers to decide what care you want to receive  You always have the right to refuse treatment  The above information is an  only  It is not intended as medical advice for individual conditions or treatments  Talk to your doctor, nurse or pharmacist before following any medical regimen to see if it is safe and effective for you  © 2017 [address] [employer] Information is for End User's use only and may not be sold, redistributed or otherwise used for commercial purposes  All illustrations and images included in CareNotes® are the copyrighted property of A D A M , Inc  or Lino Cisneros

## 2018-10-01 NOTE — NURSING NOTE
Upon review of avs summary and prescriptions with patient, patient mentioned that he had run out of home medications previous to admission and did not have funds to pay for new prescriptions  Call placed to [place], RN spoke with patient's , Simi Corrales, who stated that they would be unable to assist with prescriptions until patient seen for intake appointment on 10/4/18  Melonie Fragoso and , Ozzy Shannon ,and Rell Olivas, made aware  , authorized for hospital to assist with fill and payment of prescriptions from [facility]  for Lisinopril-Hydrochlorathiazide, Glucophage, and Zoloft as ordered per discharge instructions  Clarification obtained from Sukhjinder Vitale pa-c regarding prescription for Lantus, patient not to fill prescription until seen by [place] for follow up on diabetic regimen  Patient aware of the same and verbalized understanding of instructions provided      Belongings returned to patient at time of discharge  Patient left unit via ambulatory accompanied by friend and staff member at [phone]

## 2018-10-01 NOTE — PROGRESS NOTES
[facility]  :1960 [address][birth year] De Luna  ZVK:[id number]    PKY:[id number]  Adm Date: 2018  5:56 PM   ATT PHY: Author Aneudy Do  [address] [facility]         Subjective     The patient was seen after reviewing the chart and discussing the case with caring staff  Today during our encounter, the patient reported no acute concerns  Of note, patient is scheduled for discharge today  Patient is medically cleared for discharge  Medication list has been reconciled  Scripts have been filled out  Objective     Vitals:    10/01/18 0759   BP: 118/72   Pulse: 80   Resp: 18   Temp: 97 6 °F (36 4 °C)   SpO2: 97%       General Appearance: Awake and Alert  No acute distress  HEENT: Normocephalic, atraumatic  PERRLA, EOMI, MMM  Heart: RRR, no murmurs  Normal S1 and S2   Lungs: CTA bilaterally with fair air entry  Assessment     Claudia franco(n) 65y o  year old male with MDD with psychotic features    Medical Clearance: Patient is medically cleared for discharge today  Medication list has been reconciled  Scripts have been filled out       1  Cardiac with history of hypertension: HCTZ and lisinopril  2  Newly diagnosed type 2 diabetes mellitus: Lantus insulin 18 units at bedtime along with metformin 500 mg 2 times daily  Accu-Cheks are 3 times daily along with low-dose sliding scale coverage   Patient's hemoglobin A1c was 11 3  Dietitian is on consult for evaluation and diabetic teaching for the patient  I have written scripts for metformin and Lantus, but I informed the patient to first follow up with his primary care physician for further management, as it may be more appropriate to increase the metformin and/or add another non-insulin medication  Alternatively, given the patient's high A1c, the PCP may also make the decision for continued Lantus therapy, in which case the patient will require education on proper administration and monitoring     3  Allergic rhinitis:  Loratadine  4  New vitamin B12 deficiency: Monthly B12 injections  5  New vitamin-D deficiency:  Vitamin-D bolus doses for 12 weeks followed by vitamin D3 1000 units daily  6  Dry Eyes  Artificial Tears as needed  7  DJD/osteoarthritis with chronic lower back pain and right knee pain:  Patient may continue to receive Tylenol on as needed basis for mild pain and Voltaren gel for the right knee pain

## 2018-10-01 NOTE — PROGRESS NOTES
Patient noted to be visible in the milieu upon initial assessment and successive rounds  Patient rates anxiety 6/10, depression 5/10, denies si and hi  The patient affect noted to be quiet and cooperative, patient socializes with peers  The patient compliant with meals and medications  The patient states that he has knee and shoulder pain, with a rating of 3/10, but denies the need for pain intervention at time of initial assessment and successive rounds  Will continue to monitor

## 2018-10-01 NOTE — DISCHARGE SUMMARY
Discharge Summary - Mer Dejesus 65 y o  male MRN: [id number]  Unit/Bed#: [facility]84 Encounter: [id number]     Admission Date: 9/18/2018         Discharge Date: 10/1/2018  Attending Psychiatrist: Shaune Curling, DO    Reason for Admission/HPI: Patient admitted to the inpatient psychiatric unit due to depression and anxiety  Symptoms prior to admission included worsening depression and suicidal ideation  Patient also with history of PTSD and experiencing AH/VH, flashbacks, nightmares and estrangement  Stressors preceding admission included financial problems, job loss and every day stressors    On assessment after arrival to the [facility], [place]      Meds/Allergies     all current active meds have been reviewed and current meds:   Current Facility-Administered Medications   Medication Dose Route Frequency    acetaminophen (TYLENOL) tablet 325 mg  325 mg Oral Q4H PRN    [START ON 12/19/2018] cholecalciferol (VITAMIN D3) tablet 1,000 Units  1,000 Units Oral Daily    cyanocobalamin injection 1,000 mcg  1,000 mcg Intramuscular Q30 Days    dextran 70-hypromellose (GENTEAL TEARS) 0 1-0 3 % ophthalmic solution 1 drop  1 drop Both Eyes Q3H PRN    diclofenac sodium (VOLTAREN) 1 % topical gel 2 g  2 g Topical 4x Daily PRN    ergocalciferol (VITAMIN D2) capsule 50,000 Units  50,000 Units Oral Weekly    lisinopril (ZESTRIL) tablet 10 mg  10 mg Oral Daily    And    hydrochlorothiazide (MICROZIDE) capsule 12 5 mg  12 5 mg Oral Daily    insulin glargine (LANTUS) subcutaneous injection 18 Units 0 18 mL  18 Units Subcutaneous HS    insulin lispro (HumaLOG) 100 units/mL subcutaneous injection 1-6 Units  1-6 Units Subcutaneous TID AC    loratadine (CLARITIN) tablet 10 mg  10 mg Oral Daily    LORazepam (ATIVAN) 2 mg/mL injection 1 mg  1 mg Intramuscular Q4H PRN    LORazepam (ATIVAN) tablet 0 5 mg  0 5 mg Oral Q6H PRN    metFORMIN (GLUCOPHAGE) tablet 500 mg  500 mg Oral BID With Meals    risperiDONE (RisperDAL M-TABS) dispersible tablet 0 5 mg  0 5 mg Oral Q8H PRN    sertraline (ZOLOFT) tablet 150 mg  150 mg Oral Daily    traZODone (DESYREL) tablet 150 mg  150 mg Oral HS PRN       Allergies   Allergen Reactions    Amoxicillin GI Intolerance       Objective     Vital signs in last 24 hours:  Temp:  [97 6 °F (36 4 °C)-97 7 °F (36 5 °C)] 97 6 °F (36 4 °C)  HR:  [78-80] 80  Resp:  [18] 18  BP: (118-123)/(71-72) 118/72    No intake or output data in the 24 hours ending 10/01/18 Tracie Delgadolilo Trimble Course: The patient was admitted to the inpatient psychiatric unit and started on every 15 minutes precautions  A treatment plan was formed with focus on pharmacotherapy and milieu therapy, group therapy and individual psychotherapy when indicated  Psychiatric medications were titrated over the hospital stay and milieu therapy was utilized  To addressdepressive symptoms, Suicidal tendencies, Severe anxiety and Hallucinations the patient was started on antidepressant Zoloft and Trazodone  Medication doses were titrated during the hospital course  Patient's symptoms improved gradually over the hospital course  At the end of treatment the patient was doing well  Mood was stable at the time of discharge  The patient denied suicidal ideation, intent or plan at the time of discharge and denied homicidal ideation, intent or plan at the time of discharge  There was no overt psychosis at the time of discharge  Sleep and appetite were improved  The patient was tolerating medications and was not reporting any significant side effects at the time of discharge  Since the patient was doing well at the end of the hospitalization, treatment team felt that the patient could be safely discharged to outpatient care  The outpatient follow up with a psychiatrist was arranged by the unit  upon discharge      Mental Status at Time of Discharge:   Appearance:  Adequate hygiene and grooming   Behavior:  calm and cooperative   Speech:   Language: Normal rate and Normal volume  No overt abnormality   Mood:  anxious   Affect:   Associations: Flat  Tightly connected   Thought Process:  Goal directed and coherent   Thought Content:  Does not verbalize delusional material   Perceptual Disturbances: Denies hallucinations and does not appear to be responding to internal stimuli     Risk Potential: No suicidal or homicidal ideation   Orientation   Language Oriented x 3  anomia No   Memory  Fund of knowledge grossly intact  aware of current events, Aware of past history and vocabulary Average   Attention/Concentration attention span and concentration were age appropriate   Insight:  Good insight   Judgment: Good judgment   Gait/Station: normal gait/station and normal balance   Motor Activity: No abnormal movement noted       Admission Diagnosis:  Principal Problem:    Current severe episode of major depressive disorder without psychotic features without prior episode ([facility] )  Active Problems:    PTSD (post-traumatic stress disorder)    Anxiety      Discharge Diagnosis:     Principal Problem:    Current severe episode of major depressive disorder without psychotic features without prior episode ([place] [address] )  Active Problems:    PTSD (post-traumatic stress disorder)    Anxiety  Resolved Problems:    * No resolved hospital problems   *      Lab results:    Admission on 09/18/2018   Component Date Value    Hemoglobin A1C 09/18/2018 11 3*    EAG 09/18/2018 278     Vitamin B-12 09/18/2018 254     Vit D, 25-Hydroxy 09/18/2018 10 4*    POC Glucose 09/19/2018 270     POC Glucose 09/19/2018 300     POC Glucose 09/19/2018 193     POC Glucose 09/18/2018 148*    POC Glucose 09/19/2018 193*    POC Glucose 09/19/2018 300*    POC Glucose 09/19/2018 270*    POC Glucose 09/19/2018 265*    POC Glucose 09/19/2018 294*    POC Glucose 09/20/2018 242     POC Glucose 09/20/2018 269     POC Glucose 09/20/2018 260     POC Glucose 09/20/2018 197*    POC Glucose 09/21/2018 201     POC Glucose 09/21/2018 258     POC Glucose 09/21/2018 258     POC Glucose 09/20/2018 269*    POC Glucose 09/20/2018 260*    POC Glucose 09/20/2018 242*    POC Glucose 09/21/2018 244*    POC Glucose 09/21/2018 173*    POC Glucose 09/22/2018 161     POC Glucose 09/21/2018 258*    POC Glucose 09/21/2018 201*    POC Glucose 09/22/2018 147*    POC Glucose 09/22/2018 250*    POC Glucose 09/22/2018 182*    POC Glucose 09/22/2018 161*    POC Glucose 09/23/2018 217     POC Glucose 09/23/2018 194     POC Glucose 09/23/2018 124     POC Glucose 09/23/2018 131     POC Glucose 09/23/2018 194*    POC Glucose 09/23/2018 217*    POC Glucose 09/24/2018 167*    POC Glucose 09/24/2018 185     POC Glucose 09/24/2018 136     POC Glucose 09/24/2018 204*    POC Glucose 09/25/2018 236     POC Glucose 09/25/2018 186     POC Glucose 09/25/2018 229     POC Glucose 09/25/2018 180     POC Glucose 09/24/2018 185*    POC Glucose 09/25/2018 183*    POC Glucose 09/25/2018 180*    POC Glucose 09/25/2018 229*    POC Glucose 09/25/2018 186*    POC Glucose 09/25/2018 256*    POC Glucose 09/26/2018 204     POC Glucose 09/26/2018 181*    POC Glucose 09/26/2018 159*    POC Glucose 09/26/2018 235*    POC Glucose 09/26/2018 201*    POC Glucose 09/26/2018 163*    POC Glucose 09/27/2018 124     POC Glucose 09/27/2018 124     POC Glucose 09/27/2018 154*    POC Glucose 09/27/2018 214*    POC Glucose 09/27/2018 194*    POC Glucose 09/28/2018 138     POC Glucose 09/28/2018 161*    POC Glucose 09/28/2018 152*    POC Glucose 09/28/2018 182*    POC Glucose 09/29/2018 132     POC Glucose 09/29/2018 122     POC Glucose 09/29/2018 216*    POC Glucose 09/29/2018 195*    POC Glucose 09/30/2018 129     POC Glucose 09/30/2018 134     POC Glucose 09/30/2018 209*    POC Glucose 09/30/2018 172*    POC Glucose 09/30/2018 176*    POC Glucose 10/01/2018 119        Discharge Medications:    See after visit summary for reconciled discharge medications provided to patient and family  Discharge instructions/Information to patient and family:     See after visit summary for information provided to patient and family  Provisions for Follow-Up Care:    See after visit summary for information related to follow-up care and any pertinent home health orders  Discharge Statement     I spent 35 minutes discharging the patient  This time was spent on the day of discharge  I had direct contact with the patient on the day of discharge  Additional documentation is required if more than 30 minutes were spent on discharge:    I discussed the medication regimen and possible side effects of the medications with Scotty Palm prior to discharge  At the time of discharge he was tolerating psychiatric medications

## 2018-10-01 NOTE — NURSING NOTE
Pt present in the milieu  Affect bright on approach  NO acute behavioral or cognitive changes; will continue to monitor

## 2018-10-02 NOTE — CASE MANAGEMENT
Spoke to Helen Young of [place] # [phone] ext [phone]  Helen Young confirmed patients stay for inpatient has been approved admitted on 9/18 with a D/C on 10/1/18  Helen Young requested claim be forwarded to [place] and [place]  number will be generated

## 2019-05-28 ENCOUNTER — DOCTOR'S OFFICE (OUTPATIENT)
Dept: URBAN - NONMETROPOLITAN AREA CLINIC 1 | Facility: CLINIC | Age: 59
Setting detail: OPHTHALMOLOGY
End: 2019-05-28

## 2019-05-28 DIAGNOSIS — Z02.71: ICD-10-CM

## 2019-05-28 PROCEDURE — DISABILITY BUREAU OF DISABILITY REPORT: Performed by: OPHTHALMOLOGY

## 2019-09-22 NOTE — PROGRESS NOTES
PT Evaluation     Today's date: 2019  Patient name: Tavia Dial  : 1960  MRN: [id number]  Referring provider: Rosemarie Fish MD  Dx:   Encounter Diagnosis     ICD-10-CM    1  Arthralgia of right shoulder region M25 [id number]                   Assessment  Assessment details: The patient is a 64 y/o male who presents to PT with diagnosis of R shoulder pain  He has complaints of constant pain with most pain being along the front of his shoulder and into his trap  He also demonstrates deficits with decreased A/PROM and strength, decreased postural awareness, decreased flexibility, difficulty sleeping and pain with completing his ADLs  He remains I with all his ADLs though he has pain with completing them and increased time needed  Most pain noted with lifting and reaching  He has pain with reaching OH, out to the side and behind his back  He also has difficulty with lifting items OH, such as putting dishes into a cabinet  He also notes difficulty with sleeping and pain can wake him up overnight  TTP is noted t/o his upper trap, levator and middle trap  Secondary to pain and above deficits he is limited with his overall mobility and function  The patient would benefit from continued PT to address deficits and improve function  Tx to include ROM, stretching, strengthening, modalities, HEP, pt education, postural ed, lifting/body mechanics, neuro re-ed, balance/proprioception Te, MT and equipment  Impairments: abnormal or restricted ROM, activity intolerance, impaired physical strength, lacks appropriate home exercise program, pain with function, weight-bearing intolerance, poor posture  and poor body mechanics  Other impairment: decreased flexibility  Functional limitations: difficulty sleeping Understanding of Dx/Px/POC: good   Prognosis: fair    Goals  STGs:  1  Initiate and complete HEP with verbal cues  2   Decrease R shoulder pain by > 25% in 4 weeks    3   Improve shoulder strength by 1/2-1 grade in 4 weeks  4   Improve shoulder ROM by 15-20 degrees in 4 weeks  LTGs:  1  Patient to be I with HEP in 6 weeks  2   Improve R shoulder ROM to WNL t/o in 8 weeks to improve function  3   Improve RUE strength to > or = to 4/5 t/o in 8 weeks to improve function  4   Decrease R shoulder pain to < or = to 3-4/10 with activity in 8 weeks to improve function  5   Recreational performance in related activities is improved to PLOF in 8 weeks  6   Postural control is improved to maximal level of function in 8 weeks  7   ADL performance is improved to PLOF in 8 weeks  8   Patient to report improved sleep in 8 weeks  Plan  Patient would benefit from: skilled physical therapy  Planned modality interventions: cryotherapy, thermotherapy: hydrocollator packs, ultrasound and unattended electrical stimulation  Planned therapy interventions: manual therapy, body mechanics training, neuromuscular re-education, patient education, postural training, self care, strengthening, stretching, therapeutic activities, therapeutic exercise, flexibility and home exercise program  Frequency: 2x week  Duration in weeks: 6  Plan of Care beginning date: 2019  Plan of Care expiration date: 10/26/2019  Treatment plan discussed with: patient        Subjective Evaluation    History of Present Illness  Mechanism of injury: The patient states that a few months ago he developed R shoulder pain with no known cause  It has been getting progressively worse  He had talked to [facility] about his shoulder  He had x-rays taken which per patient was (-)  He was also referred to OPPT and he now presents for his evaluation  He is unsure if he has another appointment with the doctor for a follow up appointment      Quality of life: fair    Pain  At best pain ratin  At worst pain ratin  Location: R Shoulder - anterior shoulder   Quality: dull ache, sharp and discomfort  Relieving factors: rest  Aggravating factors: lifting    Social Support  Lives with: alone    Employment status: not working  Hand dominance: right      Diagnostic Tests  X-ray: abnormal  Patient Goals  Patient goals for therapy: decreased pain, increased motion and increased strength  Patient goal: "To be able to get more movement in the arm, to stop the pain in the arm "         Objective     Static Posture     Head  Forward  Shoulders  Rounded  Postural Observations  Seated posture: fair  Standing posture: fair  Correction of posture: has no consistent effect        Palpation   Left   No palpable tenderness to the levator scapulae, middle trapezius, rhomboids and upper trapezius  Right   No palpable tenderness to the rhomboids  Muscle spasm in the levator scapulae, middle trapezius and upper trapezius  Tenderness of the levator scapulae and upper trapezius  Neurological Testing     Sensation     Shoulder   Left Shoulder   Intact: light touch    Right Shoulder   Intact: light touch    Active Range of Motion   Left Shoulder   Flexion: 170 degrees   Abduction: 165 degrees   External rotation 90°: 80 degrees   Internal rotation 90°: 60 degrees     Right Shoulder   Flexion: 130 degrees with pain  Abduction: 100 degrees with pain  External rotation 90°: 35 degreeswith pain  Internal rotation 90°: 50 degrees with pain    Left Elbow   Normal active range of motion    Right Elbow   Normal active range of motion    Passive Range of Motion     Right Shoulder   Flexion: 140 degrees with pain  Abduction: 110 degrees with pain  External rotation 90°: 40 degrees with pain    Strength/Myotome Testing     Right Shoulder     Planes of Motion   Flexion: 3   Abduction: 3-   External rotation at 90°: 3   Internal rotation at 90°: 3+     Left Elbow   Normal strength    Right Elbow   Normal strength    Tests     Right Shoulder   Positive empty can and painful arc  Negative drop arm       Ambulation     Ambulation: Level Surfaces   Ambulation without assistive device: independent         Precautions: None  Re-Eval DUE: 10/26/19  Specialty Daily Treatment Diary     Manual  9/26/19       PROM - all planes - R shdr NV                                   Exercise Diary  9/26/19       UBE        Pulleys - flex/abd        Finger Ladder - flex/abd        Pendulums - all directions        MTP/LTP        Tband - ER/IR        Shrugs/Rolls/ Retractions        Wall Pushups        Wall Slides - flex/abd        Isometrics - all planes        Stand - FF and Abd        Table Slides - flex/abd        Corner Stretch        Supine Cane TE - flex/abd/ER        Supine Serratus Punches        S/L ER and Abd        Prone - flex/ext/rows/ horiz abd        Prone - T & V's        Ball Roll on Wall -up/down/L/R/cw/ccw        Tband - Punches            Modalities 9/26/19       Estim/IFC with HP/CP        Ultrasound - R trap/shoulder 10 minutes, 50%, 1 mHz, 1 2 w/cm                    Issued and reviewed HEP with patient for shoulder shrugs, shoulder retractions, wall slides for flexion, pendulums for cw/ccw and front/back, and cane flexion TE  He verbalized understanding

## 2019-09-26 ENCOUNTER — EVALUATION (OUTPATIENT)
Dept: PHYSICAL THERAPY | Facility: HOME HEALTHCARE | Age: 59
End: 2019-09-26
Payer: OTHER GOVERNMENT

## 2019-09-26 ENCOUNTER — TRANSCRIBE ORDERS (OUTPATIENT)
Dept: PHYSICAL THERAPY | Facility: HOME HEALTHCARE | Age: 59
End: 2019-09-26

## 2019-09-26 DIAGNOSIS — M25.511 ARTHRALGIA OF RIGHT SHOULDER REGION: Primary | ICD-10-CM

## 2019-09-26 PROCEDURE — 97162 PT EVAL MOD COMPLEX 30 MIN: CPT | Performed by: PHYSICAL THERAPIST

## 2019-09-26 PROCEDURE — 97535 SELF CARE MNGMENT TRAINING: CPT | Performed by: PHYSICAL THERAPIST

## 2019-09-26 PROCEDURE — 97035 APP MDLTY 1+ULTRASOUND EA 15: CPT | Performed by: PHYSICAL THERAPIST

## 2019-09-26 NOTE — LETTER
2019    [address] Erlin Soliz MD  [address] [facility] [address]    Patient: Camryn Parish   YOB: 1960   Date of Visit: 2019     Encounter Diagnosis     ICD-10-CM    1  Arthralgia of right shoulder region M25 511        Dear Dr Erlin Soliz: Thank you for your recent referral of Camryn Parish  Please review the attached evaluation summary from Aguila's recent visit  Please verify that you agree with the plan of care by signing the attached order  If you have any questions or concerns, please do not hesitate to call  I sincerely appreciate the opportunity to share in the care of one of your patients and hope to have another opportunity to work with you in the near future  Sincerely,    Miguel A Howell, PT      Referring Provider:      I certify that I have read the below Plan of Care and certify the need for these services furnished under this plan of treatment while under my care  Liana Faustin MD  [address] [facility] [address]  VIA Facsimile: [phone]          PT Evaluation     Today's date: 2019  Patient name: Camryn Parish  : 1960  MRN: [id number]  Referring provider: Ailyn Hodsgon MD  Dx:   Encounter Diagnosis     ICD-10-CM    1  Arthralgia of right shoulder region M25 511                   Assessment  Assessment details: The patient is a 64 y/o male who presents to PT with diagnosis of R shoulder pain  He has complaints of constant pain with most pain being along the front of his shoulder and into his trap  He also demonstrates deficits with decreased A/PROM and strength, decreased postural awareness, decreased flexibility, difficulty sleeping and pain with completing his ADLs  He remains I with all his ADLs though he has pain with completing them and increased time needed  Most pain noted with lifting and reaching  He has pain with reaching OH, out to the side and behind his back    He also has difficulty with lifting items OH, such as putting dishes into a cabinet  He also notes difficulty with sleeping and pain can wake him up overnight  TTP is noted t/o his upper trap, levator and middle trap  Secondary to pain and above deficits he is limited with his overall mobility and function  The patient would benefit from continued PT to address deficits and improve function  Tx to include ROM, stretching, strengthening, modalities, HEP, pt education, postural ed, lifting/body mechanics, neuro re-ed, balance/proprioception Te, MT and equipment  Impairments: abnormal or restricted ROM, activity intolerance, impaired physical strength, lacks appropriate home exercise program, pain with function, weight-bearing intolerance, poor posture  and poor body mechanics  Other impairment: decreased flexibility  Functional limitations: difficulty sleeping Understanding of Dx/Px/POC: good   Prognosis: fair    Goals  STGs:  1  Initiate and complete HEP with verbal cues  2   Decrease R shoulder pain by > 25% in 4 weeks  3   Improve shoulder strength by 1/2-1 grade in 4 weeks  4   Improve shoulder ROM by 15-20 degrees in 4 weeks  LTGs:  1  Patient to be I with HEP in 6 weeks  2   Improve R shoulder ROM to WNL t/o in 8 weeks to improve function  3   Improve RUE strength to > or = to 4/5 t/o in 8 weeks to improve function  4   Decrease R shoulder pain to < or = to 3-4/10 with activity in 8 weeks to improve function  5   Recreational performance in related activities is improved to PLOF in 8 weeks  6   Postural control is improved to maximal level of function in 8 weeks  7   ADL performance is improved to PLOF in 8 weeks  8   Patient to report improved sleep in 8 weeks        Plan  Patient would benefit from: skilled physical therapy  Planned modality interventions: cryotherapy, thermotherapy: hydrocollator packs, ultrasound and unattended electrical stimulation  Planned therapy interventions: manual therapy, body mechanics training, neuromuscular re-education, patient education, postural training, self care, strengthening, stretching, therapeutic activities, therapeutic exercise, flexibility and home exercise program  Frequency: 2x week  Duration in weeks: 6  Plan of Care beginning date: 2019  Plan of Care expiration date: 10/26/2019  Treatment plan discussed with: patient        Subjective Evaluation    History of Present Illness  Mechanism of injury: The patient states that a few months ago he developed R shoulder pain with no known cause  It has been getting progressively worse  He had talked to the [facility] doctor about his shoulder  He had x-rays taken which per patient was (-)  He was also referred to OPPT and he now presents for his evaluation  He is unsure if he has another appointment with the doctor for a follow up appointment  Quality of life: fair    Pain  At best pain ratin  At worst pain ratin  Location: R Shoulder - anterior shoulder   Quality: dull ache, sharp and discomfort  Relieving factors: rest  Aggravating factors: lifting    Social Support  Lives with: alone    Employment status: not working  Hand dominance: right      Diagnostic Tests  X-ray: abnormal  Patient Goals  Patient goals for therapy: decreased pain, increased motion and increased strength  Patient goal: "To be able to get more movement in the arm, to stop the pain in the arm "         Objective     Static Posture     Head  Forward  Shoulders  Rounded  Postural Observations  Seated posture: fair  Standing posture: fair  Correction of posture: has no consistent effect        Palpation   Left   No palpable tenderness to the levator scapulae, middle trapezius, rhomboids and upper trapezius  Right   No palpable tenderness to the rhomboids  Muscle spasm in the levator scapulae, middle trapezius and upper trapezius  Tenderness of the levator scapulae and upper trapezius       Neurological Testing Sensation     Shoulder   Left Shoulder   Intact: light touch    Right Shoulder   Intact: light touch    Active Range of Motion   Left Shoulder   Flexion: 170 degrees   Abduction: 165 degrees   External rotation 90°:  80 degrees   Internal rotation 90°:  60 degrees     Right Shoulder   Flexion: 130 degrees with pain  Abduction: 100 degrees with pain  External rotation 90°:  35 degreeswith pain  Internal rotation 90°:  50 degrees with pain    Left Elbow   Normal active range of motion    Right Elbow   Normal active range of motion    Passive Range of Motion     Right Shoulder   Flexion: 140 degrees with pain  Abduction: 110 degrees with pain  External rotation 90°:  40 degrees with pain    Strength/Myotome Testing     Right Shoulder     Planes of Motion   Flexion: 3   Abduction: 3-   External rotation at 90°:  3   Internal rotation at 90°:  3+     Left Elbow   Normal strength    Right Elbow   Normal strength    Tests     Right Shoulder   Positive empty can and painful arc  Negative drop arm       Ambulation     Ambulation: Level Surfaces   Ambulation without assistive device: independent         Precautions: None  Re-Eval DUE: 10/26/19  Specialty Daily Treatment Diary     Manual  9/26/19       PROM - all planes - R shdr NV                                   Exercise Diary  9/26/19       UBE        Pulleys - flex/abd        Finger Ladder - flex/abd        Pendulums - all directions        MTP/LTP        Tband - ER/IR        Shrugs/Rolls/ Retractions        Wall Pushups        Wall Slides - flex/abd        Isometrics - all planes        Stand - FF and Abd        Table Slides - flex/abd        Corner Stretch        Supine Cane TE - flex/abd/ER        Supine Serratus Punches        S/L ER and Abd        Prone - flex/ext/rows/ horiz abd        Prone - T & V's        Ball Roll on Wall -up/down/L/R/cw/ccw        Tband - Punches            Modalities 9/26/19       Estim/IFC with HP/CP        Ultrasound - R trap/shoulder 10 minutes, 50%, 1 mHz, 1 2 w/cm                    Issued and reviewed HEP with patient for shoulder shrugs, shoulder retractions, wall slides for flexion, pendulums for cw/ccw and front/back, and cane flexion TE  He verbalized understanding

## 2019-10-02 ENCOUNTER — APPOINTMENT (OUTPATIENT)
Dept: PHYSICAL THERAPY | Facility: HOME HEALTHCARE | Age: 59
End: 2019-10-02
Payer: OTHER GOVERNMENT

## 2019-10-04 ENCOUNTER — OFFICE VISIT (OUTPATIENT)
Dept: PHYSICAL THERAPY | Facility: HOME HEALTHCARE | Age: 59
End: 2019-10-04
Payer: OTHER GOVERNMENT

## 2019-10-04 DIAGNOSIS — M25.511 ARTHRALGIA OF RIGHT SHOULDER REGION: Primary | ICD-10-CM

## 2019-10-04 PROCEDURE — 97035 APP MDLTY 1+ULTRASOUND EA 15: CPT

## 2019-10-04 PROCEDURE — 97110 THERAPEUTIC EXERCISES: CPT

## 2019-10-04 NOTE — PROGRESS NOTES
Daily Note     Today's date: 10/4/2019  Patient name: Mira Toth  : 1960  MRN: [id number]  Referring provider: Hiral Mauro MD  Dx:   Encounter Diagnosis     ICD-10-CM    1  Arthralgia of right shoulder region M25 511                   Subjective: Still very sore  I can't move my shoulder much  I felt better after the ultrasound      Objective: See treatment diary below  12 weeks post op    Assessment: Tolerated treatment fair and significant limitiation in ranges  Patient would benefit from continued PT      Plan: Continue per plan of care  Progress treatment as tolerated         Precautions: None  Re-Eval DUE: 10/26/19  Specialty Daily Treatment Diary     Manual  9/26/19 10/4/2019      PROM - all planes - R shdr NV 8 min                                  Exercise Diary  9/26/19 10/4      UBE  Alt 1 min ea      Pulleys - flex/abd  X 30      Finger Ladder - flex/abd        Pendulums - all directions  X 10      MTP/LTP        Tband - ER/IR        Shrugs/Rolls/ Retractions  X 10      Wall Pushups        Wall Slides - flex/abd        Isometrics - all planes        Stand - FF and Abd        Table Slides - flex/abd        Corner Stretch        Supine Cane TE - flex/abd/ER        Supine Serratus Punches        S/L ER and Abd        Prone - flex/ext/rows/ horiz abd        Prone - T & V's        Ball Roll on Wall -up/down/L/R/cw/ccw        Tband - Punches            Modalities 9/26/19 10/4      Estim/IFC with HP/CP  HP x 10      Ultrasound - R trap/shoulder 10 minutes, 50%, 1 mHz, 1 2 w/cm X 10 min

## 2019-10-08 ENCOUNTER — OFFICE VISIT (OUTPATIENT)
Dept: PHYSICAL THERAPY | Facility: HOME HEALTHCARE | Age: 59
End: 2019-10-08
Payer: OTHER GOVERNMENT

## 2019-10-08 DIAGNOSIS — M25.511 ARTHRALGIA OF RIGHT SHOULDER REGION: Primary | ICD-10-CM

## 2019-10-08 PROCEDURE — 97140 MANUAL THERAPY 1/> REGIONS: CPT

## 2019-10-08 PROCEDURE — 97035 APP MDLTY 1+ULTRASOUND EA 15: CPT

## 2019-10-08 PROCEDURE — 97110 THERAPEUTIC EXERCISES: CPT

## 2019-10-08 NOTE — PROGRESS NOTES
Daily Note     Today's date: 10/8/2019  Patient name: Twin Mejia  : 1960  MRN: [id number]  Referring provider: Kris Cordero MD  Dx:   Encounter Diagnosis     ICD-10-CM    1  Arthralgia of right shoulder region M25 511               Subjective: Pt reports his R shoulder is sore  Objective: See treatment diary below      Assessment: Tolerated treatment fair  Verbal cues needed t/o TE to perform correctly  Added new exercises to program today  ROM and strength deficits noted R shoulder/RUE  Patient would benefit from continued PT      Plan: Continue per plan of care        Precautions: None  Re-Eval DUE: 10/26/19  Specialty Daily Treatment Diary     Manual  9/26/19 10/4/2019 10/8/19     PROM - all planes - R shdr NV 8 min 10 min                                 Exercise Diary  9/26/19 10/4 10/8/19     UBE  Alt 1 min ea Alt 10 min     Pulleys - flex/abd  X 30 X 30 ea      Finger Ladder - flex/abd   1 x 3 ea      Pendulums - all directions  X 10 1 x 10 ea      MTP/LTP        Tband - ER/IR        Shrugs/Rolls/ Retractions  X 10 1 x 10 ea      Wall Pushups        Wall Slides - flex/abd   1 x 10 ea      Isometrics - all planes        Stand - FF and Abd        Table Slides - flex/abd        Corner Stretch        Supine Cane TE - flex/abd/ER   1 x 10 Flex/abd     Supine Serratus Punches   1 x 10      S/L ER and Abd        Prone - flex/ext/rows/ horiz abd        Prone - T & V's        Ball Roll on Wall -up/down/L/R/cw/ccw        Tband - Punches            Modalities 9/26/19 10/4 10/8/19     Estim/IFC with HP/CP  HP x 10 HP 10 min     Ultrasound - R trap/shoulder 10 minutes, 50%, 1 mHz, 1 2 w/cm X 10 min 10 min

## 2019-10-10 ENCOUNTER — OFFICE VISIT (OUTPATIENT)
Dept: PHYSICAL THERAPY | Facility: HOME HEALTHCARE | Age: 59
End: 2019-10-10
Payer: OTHER GOVERNMENT

## 2019-10-10 DIAGNOSIS — M25.511 ARTHRALGIA OF RIGHT SHOULDER REGION: Primary | ICD-10-CM

## 2019-10-10 PROCEDURE — 97035 APP MDLTY 1+ULTRASOUND EA 15: CPT | Performed by: PHYSICAL THERAPIST

## 2019-10-10 PROCEDURE — 97110 THERAPEUTIC EXERCISES: CPT | Performed by: PHYSICAL THERAPIST

## 2019-10-10 PROCEDURE — 97140 MANUAL THERAPY 1/> REGIONS: CPT | Performed by: PHYSICAL THERAPIST

## 2019-10-10 NOTE — PROGRESS NOTES
Daily Note     Today's date: 10/10/2019  Patient name: Samira Munson  : 1960  MRN: [id number]  Referring provider: Ganga Henry MD  Dx:   Encounter Diagnosis     ICD-10-CM    1  Arthralgia of right shoulder region M25 511                   Subjective: The patient states that his shoulder is sore today, he rates pain 7/10 at start of session  Objective: See treatment diary below      Assessment: Fair tolerance to TE today  Some verbal cues are needed for correct form with completing TE  Pain noted in shoulder with most motions and pain at end range with PROM  HP x 10 minutes to end session and he reports decreased pain after this  Plan: Continue per plan of care  Progress as able in upcoming visits         Precautions: None  Re-Eval DUE: 10/26/19  Specialty Daily Treatment Diary     Manual  9/26/19 10/4/2019 10/8/19 10/10/19    PROM - all planes - R shdr NV 8 min 10 min 10 minutes                                Exercise Diary  9/26/19 10/4 10/8/19 10/10/19    UBE  Alt 1 min ea Alt 10 min Alt 10 minutes    Pulleys - flex/abd  X 30 X 30 ea  1 x 30 each    Finger Ladder - flex/abd   1 x 3 ea  1 x 3 each    Pendulums - all directions  X 10 1 x 10 ea  1 x 10 each    MTP/LTP        Tband - ER/IR        Shrugs/Rolls/ Retractions  X 10 1 x 10 ea  1 x 10 each    Wall Pushups        Wall Slides - flex/abd   1 x 10 ea  1 x 10 each    Isometrics - all planes        Stand - FF and Abd        Table Slides - flex/abd        Corner Stretch        Supine Cane TE - flex/abd/ER   1 x 10 Flex/abd Flex/Abd 1 x 10 each    Supine Serratus Punches   1 x 10  1 x 10     S/L ER and Abd        Prone - flex/ext/rows/ horiz abd        Prone - T & V's        Ball Roll on Wall -up/down/L/R/cw/ccw        Tband - Punches            Modalities 9/26/19 10/4 10/8/19 10/10/19    Estim/IFC with HP/CP  HP x 10 HP 10 min HP 10 minutes    Ultrasound - R trap/shoulder 10 minutes, 50%, 1 mHz, 1 2 w/cm X 10 min 10 min  10 minutes

## 2019-10-15 ENCOUNTER — OFFICE VISIT (OUTPATIENT)
Dept: PHYSICAL THERAPY | Facility: HOME HEALTHCARE | Age: 59
End: 2019-10-15
Payer: OTHER GOVERNMENT

## 2019-10-15 DIAGNOSIS — M25.511 ARTHRALGIA OF RIGHT SHOULDER REGION: Primary | ICD-10-CM

## 2019-10-15 PROCEDURE — 97140 MANUAL THERAPY 1/> REGIONS: CPT | Performed by: PHYSICAL THERAPIST

## 2019-10-15 PROCEDURE — 97110 THERAPEUTIC EXERCISES: CPT | Performed by: PHYSICAL THERAPIST

## 2019-10-15 PROCEDURE — 97035 APP MDLTY 1+ULTRASOUND EA 15: CPT | Performed by: PHYSICAL THERAPIST

## 2019-10-15 NOTE — PROGRESS NOTES
Daily Note     Today's date: 10/15/2019  Patient name: Samuel Newman  : 1960  MRN: [id number]  Referring provider: Kerrie Walker MD  Dx:   Encounter Diagnosis     ICD-10-CM    1  Arthralgia of right shoulder region M25 511                   Subjective: The patient states, "I have the usual pain in my shoulder but sometimes I feel like the colder weather makes it worse "  He rates pain 6-7/10 at start of session today  Objective: See treatment diary below      Assessment: Progressed patient as per daily treatment diary below  He had most pain with abduction motions, such as when doing the pulleys, supine cane and wall slides  Tightness and pain at end range is also noted with MT   HP x 10 minutes to end session and he reports decreased pain after this  Plan: Continue per plan of care  Continue to progress as able in upcoming visits         Precautions: None  Re-Eval DUE: 10/26/19  Specialty Daily Treatment Diary     Manual  9/26/19 10/4/2019 10/8/19 10/10/19 10/15/19   PROM - all planes - R shdr NV 8 min 10 min 10 minutes 10 minutes                               Exercise Diary  9/26/19 10/4 10/8/19 10/10/19 10/15/19   UBE  Alt 1 min ea Alt 10 min Alt 10 minutes Alt 10 minutes   Pulleys - flex/abd  X 30 X 30 ea  1 x 30 each 1 x 30 each   Finger Ladder - flex/abd   1 x 3 ea  1 x 3 each 1 x 3 each   Pendulums - all directions  X 10 1 x 10 ea  1 x 10 each 1 x 10 each   MTP/LTP     Red 1 x 10 each   Tband - ER/IR        Shrugs/Rolls/ Retractions  X 10 1 x 10 ea  1 x 10 each 1 x 10 each   Wall Pushups     1 x 10    Wall Slides - flex/abd   1 x 10 ea  1 x 10 each 1 x 10 each   Isometrics - all planes        Stand - FF and Abd        Table Slides - flex/abd        Corner Stretch        Supine Cane TE - flex/abd/ER   1 x 10 Flex/abd Flex/Abd 1 x 10 each Flex/Abd 1 x 10 each   Supine Serratus Punches   1 x 10  1 x 10  1 x 10    S/L ER and Abd        Prone - flex/ext/rows/ horiz abd        Prone - T & V's        [employer] on [place] -up/down/L/R/cw/ccw        Tband - Punches            Modalities 9/26/19 10/4 10/8/19 10/10/19 10/15/19   Estim/IFC with HP/CP  HP x 10 HP 10 min HP 10 minutes HP 10 minutes   Ultrasound - R trap/shoulder 10 minutes, 50%, 1 mHz, 1 2 w/cm X 10 min 10 min  10 minutes 10 minutes, 50%, 1 mHz, 1 2 w/cm

## 2019-10-17 ENCOUNTER — OFFICE VISIT (OUTPATIENT)
Dept: PHYSICAL THERAPY | Facility: HOME HEALTHCARE | Age: 59
End: 2019-10-17
Payer: OTHER GOVERNMENT

## 2019-10-17 DIAGNOSIS — M25.511 ARTHRALGIA OF RIGHT SHOULDER REGION: Primary | ICD-10-CM

## 2019-10-17 PROCEDURE — 97035 APP MDLTY 1+ULTRASOUND EA 15: CPT

## 2019-10-17 PROCEDURE — 97110 THERAPEUTIC EXERCISES: CPT

## 2019-10-17 PROCEDURE — 97140 MANUAL THERAPY 1/> REGIONS: CPT

## 2019-10-17 NOTE — PROGRESS NOTES
Daily Note     Today's date: 10/17/2019  Patient name: Tana Rojas  : 1960  MRN: [id number]  Referring provider: Elidia Elizondo MD  Dx:   Encounter Diagnosis     ICD-10-CM    1  Arthralgia of right shoulder region M25 511               Subjective: Pain of 7-8/10 at R sh  The cold, damp weather really bothers me  Objective: See treatment diary below    Assessment: Tolerated treatment well  Added isometric ex and cane ER  Pt limited by pain in all planes  Pt reports mild relief at end with US and MHP  Patient would benefit from continued PT    Plan: Continue per plan of care        Precautions: None  Re-Eval DUE: 10/26/19  Specialty Daily Treatment Diary     Manual  10-17-19 10/4/2019 10/8/19 10/10/19 10/15/19   PROM - all planes - R shdr 10' 8 min 10 min 10 minutes 10 minutes       Exercise Diary  10-17-19 10/4 10/8/19 10/10/19 10/15/19   UBE Alt 10' Alt 1 min ea Alt 10 min Alt 10 minutes Alt 10 minutes   Pulleys - flex/abd 1 x 30 ea X 30 X 30 ea  1 x 30 each 1 x 30 each   Finger Ladder - flex/abd 1 x 3 ea  1 x 3 ea  1 x 3 each 1 x 3 each   Pendulums - all directions 1 x 10 X 10 1 x 10 ea  1 x 10 each 1 x 10 each   MTP/LTP Red 1 x 10 ea    Red 1 x 10 each   Tband - ER/IR        Shrugs/Rolls/ Retractions 1 x 10 ea X 10 1 x 10 ea  1 x 10 each 1 x 10 each   Wall Pushups 1 x 10    1 x 10    Wall Slides - flex/abd 1 x 10 ea  1 x 10 ea  1 x 10 each 1 x 10 each   Isometrics - all planes 5" x 5 ea       Stand - FF and Abd        Table Slides - flex/abd        Corner Stretch        Supine Cane TE - flex/abd/ER Flex/abd/ER  1 x 10 ea  1 x 10 Flex/abd Flex/Abd 1 x 10 each Flex/Abd/ 1 x 10 each   Supine Serratus Punches 1 x 10  1 x 10  1 x 10  1 x 10    S/L ER and Abd        Prone - flex/ext/rows/ horiz abd        Prone - T & V's        Ball Roll on Wall -up/down/L/R/cw/ccw        Tband - Punches            Modalities 10-17-19 10/4 10/8/19 10/10/19 10/15/19   Estim/IFC with HP/CP HP  10' HP x 10 HP 10 min HP 10 minutes HP 10 minutes   Ultrasound - R trap/shoulder 10 minutes, 50%, 1 mHz, 1 2 w/cm X 10 min 10 min  10 minutes 10 minutes, 50%, 1 mHz, 1 2 w/cm

## 2019-10-22 ENCOUNTER — OFFICE VISIT (OUTPATIENT)
Dept: PHYSICAL THERAPY | Facility: HOME HEALTHCARE | Age: 59
End: 2019-10-22
Payer: OTHER GOVERNMENT

## 2019-10-22 DIAGNOSIS — M25.511 ARTHRALGIA OF RIGHT SHOULDER REGION: Primary | ICD-10-CM

## 2019-10-22 PROCEDURE — 97110 THERAPEUTIC EXERCISES: CPT | Performed by: PHYSICAL THERAPIST

## 2019-10-22 PROCEDURE — 97035 APP MDLTY 1+ULTRASOUND EA 15: CPT | Performed by: PHYSICAL THERAPIST

## 2019-10-22 PROCEDURE — 97140 MANUAL THERAPY 1/> REGIONS: CPT | Performed by: PHYSICAL THERAPIST

## 2019-10-22 NOTE — PROGRESS NOTES
Daily Note     Today's date: 10/22/2019  Patient name: Jodee Wheeler  : 1960  MRN: [id number]  Referring provider: Scott Felix MD  Dx:   Encounter Diagnosis     ICD-10-CM    1  Arthralgia of right shoulder region M25 511                   Subjective: The patient states that his shoulder is sore today, "I think it's sore from the rainy and damp weather "  He rates pain 7/10 at start of session  Objective: See treatment diary below      Assessment: Tightness is noted for all planes with PROM today with most tightness with ER  He did note slight increased discomfort with completion of TE   US followed by HP x 10 minutes to end session and he reports decreased pain after this  Plan: Continue per plan of care  Progress as able in upcoming visits        Precautions: None  Re-Eval DUE: 10/26/19  Specialty Daily Treatment Diary     Manual  10-17-19 10/22/2019 10/8/19 10/10/19 10/15/19   PROM - all planes - R shdr 10' 10 minutes 10 min 10 minutes 10 minutes       Exercise Diary  10-17-19 10/22/19 10/8/19 10/10/19 10/15/19   UBE Alt 10' Alt 10 minutes Alt 10 min Alt 10 minutes Alt 10 minutes   Pulleys - flex/abd 1 x 30 ea 1 x 30 each X 30 ea  1 x 30 each 1 x 30 each   Finger Ladder - flex/abd 1 x 3 ea 1 x 3 each 1 x 3 ea  1 x 3 each 1 x 3 each   Pendulums - all directions 1 x 10 D/C  1 x 10 ea  1 x 10 each 1 x 10 each   MTP/LTP Red 1 x 10 ea Red 1 x 10 each   Red 1 x 10 each   Tband - ER/IR  NV      Shrugs/Rolls/ Retractions 1 x 10 ea 1 x 10 each 1 x 10 ea  1 x 10 each 1 x 10 each   Wall Pushups 1 x 10 1 x 10   1 x 10    Wall Slides - flex/abd 1 x 10 ea 1 x 10 each 1 x 10 ea  1 x 10 each 1 x 10 each   Isometrics - all planes 5" x 5 ea 5" x 5 each      Stand - FF and Abd        Table Slides - flex/abd        Corner Stretch        Supine Cane TE - flex/abd/ER Flex/abd/ER  1 x 10 ea Flex/Abd/ER  1 x 10 each 1 x 10 Flex/abd Flex/Abd 1 x 10 each Flex/Abd/ 1 x 10 each   Supine Serratus Punches 1 x 10 1 x 10  1 x 10  1 x 10  1 x 10    S/L ER and Abd        Prone - flex/ext/rows/ horiz abd        Prone - T & V's        Ball Roll on Wall-up/down/L/R/cw/ccw        Tband - Punches            Modalities 10-17-19 10/22/19 10/8/19 10/10/19 10/15/19   Estim/IFC with HP/CP HP  10' HP 10 minutes HP 10 min HP 10 minutes HP 10 minutes   Ultrasound - R trap/shoulder 10 minutes, 50%, 1 mHz, 1 2 w/cm 10 minutes  50%, 1 mHz, 1 2 w/cm 10 min  10 minutes 10 minutes, 50%, 1 mHz, 1 2 w/cm

## 2019-10-24 ENCOUNTER — APPOINTMENT (OUTPATIENT)
Dept: PHYSICAL THERAPY | Facility: HOME HEALTHCARE | Age: 59
End: 2019-10-24
Payer: OTHER GOVERNMENT

## 2019-10-25 ENCOUNTER — OFFICE VISIT (OUTPATIENT)
Dept: PHYSICAL THERAPY | Facility: HOME HEALTHCARE | Age: 59
End: 2019-10-25
Payer: OTHER GOVERNMENT

## 2019-10-25 ENCOUNTER — TRANSCRIBE ORDERS (OUTPATIENT)
Dept: PHYSICAL THERAPY | Facility: HOME HEALTHCARE | Age: 59
End: 2019-10-25

## 2019-10-25 DIAGNOSIS — M25.511 RIGHT SHOULDER PAIN, UNSPECIFIED CHRONICITY: Primary | ICD-10-CM

## 2019-10-25 DIAGNOSIS — M25.511 ARTHRALGIA OF RIGHT SHOULDER REGION: Primary | ICD-10-CM

## 2019-10-25 PROCEDURE — 97140 MANUAL THERAPY 1/> REGIONS: CPT

## 2019-10-25 PROCEDURE — 97035 APP MDLTY 1+ULTRASOUND EA 15: CPT

## 2019-10-25 PROCEDURE — 97110 THERAPEUTIC EXERCISES: CPT

## 2019-10-25 NOTE — PROGRESS NOTES
PT Re-Evaluation     Today's date: 10/28/19  Patient name: Ana Maza  : 1960  MRN: [id number]  Referring provider: Verena Baker MD  Dx:   Encounter Diagnosis     ICD-10-CM    1  Arthralgia of right shoulder region M25 511                   Assessment  Assessment details: The patient has been seen in PT for a total of 9 visits since [facility] with good PT attendance noted  He still has complaints of constant pain with most pain being along the front of his shoulder  He has made improvements since [facility] but continues to demonstrate deficits with decreased A/PROM and strength, decreased postural awareness, decreased flexibility, difficulty sleeping and pain with completing his ADLs  He still has pain with lifting and reaching forward/going forward with his arm  He still has pain with completing his ADLs and activities at home  Most pain remains with lifting and reaching and he still has difficulty with completing OH tasks  Pain also noted when reaching out to the side and behind his back  Sleep can still be interrupted, laying a certain way will increase pain in his shoulder and wake him up  TTP remains along his AC joint and along the front of his shoulder  He is making progress towards his goals and has met some STGs  The patient may benefit from continued PT to address deficits and improve function  Tx to include ROM, stretching, strengthening, modalities, HEP, pt education, postural ed, lifting/body mechanics, neuro re-ed, balance/proprioception Te, MT and equipment  Will progress with TE for ROM, stretching, and strengthening in upcoming visits as able  Also spoke with patient about contacting doctor to get an appointment for his shoulder        Impairments: abnormal or restricted ROM, activity intolerance, impaired physical strength, lacks appropriate home exercise program, pain with function, weight-bearing intolerance, poor posture  and poor body mechanics  Other impairment: decreased flexibility  Functional limitations: difficulty sleeping Understanding of Dx/Px/POC: good   Prognosis: fair    Goals  STGs:  1  Initiate and complete HEP with verbal cues  - met  2  Decrease R shoulder pain by > 25% in 4 weeks  - partially met  3  Improve shoulder strength by 1/2-1 grade in 4 weeks  - partially met  4  Improve shoulder ROM by 15-20 degrees in 4 weeks  - partially met  LTGs:  1  Patient to be I with HEP in 6 weeks  2   Improve R shoulder ROM to WNL t/o in 8 weeks to improve function  3   Improve RUE strength to > or = to 4/5 t/o in 8 weeks to improve function  4   Decrease R shoulder pain to < or = to 3-4/10 with activity in 8 weeks to improve function  5   Recreational performance in related activities is improved to PLOF in 8 weeks  6   Postural control is improved to maximal level of function in 8 weeks  7   ADL performance is improved to PLOF in 8 weeks  8   Patient to report improved sleep in 8 weeks  Plan  Plan details: Modalities and therapeutic intervention prn  Patient would benefit from: skilled physical therapy  Planned modality interventions: cryotherapy, thermotherapy: hydrocollator packs, ultrasound and unattended electrical stimulation  Planned therapy interventions: manual therapy, body mechanics training, neuromuscular re-education, patient education, postural training, self care, strengthening, stretching, therapeutic activities, therapeutic exercise, flexibility and home exercise program  Frequency: 2x week  Duration in weeks: 4  Plan of Care beginning date: 10/28/2019  Plan of Care expiration date: 11/28/2019  Treatment plan discussed with: patient        Subjective Evaluation    History of Present Illness  Mechanism of injury: The patient states that he still gets pain in his shoulder though some improvement is noted since starting therapy  Most pain noted with reaching forward or overhead    He is unsure if he has another appointment with the doctor for a follow up appointment  Quality of life: fair    Pain  At best pain ratin  At worst pain ratin  Location: R Shoulder - anterior shoulder   Quality: dull ache, sharp and discomfort  Relieving factors: rest  Aggravating factors: lifting    Social Support  Lives with: alone    Employment status: not working  Hand dominance: right      Diagnostic Tests  X-ray: abnormal  Patient Goals  Patient goals for therapy: decreased pain, increased motion and increased strength  Patient goal: "To be able to get more movement in the arm, to stop the pain in the arm "         Objective     Static Posture     Head  Forward  Shoulders  Rounded  Postural Observations  Seated posture: fair  Standing posture: fair  Correction of posture: has no consistent effect        Palpation   Left   No palpable tenderness to the levator scapulae, middle trapezius, rhomboids and upper trapezius  Right   No palpable tenderness to the levator scapulae, middle trapezius and rhomboids  Tenderness of the upper trapezius       Neurological Testing     Sensation     Shoulder   Left Shoulder   Intact: light touch    Right Shoulder   Intact: light touch    Active Range of Motion   Left Shoulder   Flexion: 170 degrees   Abduction: 165 degrees   External rotation 90°: 80 degrees   Internal rotation 90°: 60 degrees     Right Shoulder   Flexion: 140 degrees with pain  Abduction: 110 degrees with pain  External rotation 90°: 50 degreeswith pain  Internal rotation 90°: 45 degrees with pain    Left Elbow   Normal active range of motion    Right Elbow   Normal active range of motion    Passive Range of Motion     Right Shoulder   Flexion: 140 degrees with pain  Abduction: 110 degrees with pain  External rotation 90°: 55 degrees with pain    Strength/Myotome Testing     Right Shoulder     Planes of Motion   Flexion: 3+   Abduction: 3-   External rotation at 90°: 3   Internal rotation at 90°: 3+     Left Elbow   Normal strength    Right Elbow Normal strength    Tests     Right Shoulder   Positive empty can and painful arc  Negative drop arm       Ambulation     Ambulation: Level Surfaces   Ambulation without assistive device: independent         Precautions: None  Re-Eval DUE: 11/28/19  Specialty Daily Treatment Diary      Manual  10-17-19 10/22/2019 10-25-19 10/28/19 10/15/19   PROM - all planes - R shdr 10' 10 minutes 10' 10 minutes 10 minutes         Exercise Diary  10-17-19 10/22/19 10-25-19 10/28/19 10/15/19   UBE Alt 10' Alt 10 minutes Alt 10' Alt 10 minutes Alt 10 minutes   Pulleys - flex/abd 1 x 30 ea 1 x 30 each 1 x 30 ea 1 x 30 each 1 x 30 each   Finger Ladder - flex/abd 1 x 3 ea 1 x 3 each 1 x 3 ea  1 x 3 each 1 x 3 each   Pendulums - all directions 1 x 10 D/C  DC  D/C 1 x 10 each   MTP/LTP Red 1 x 10 ea Red 1 x 10 each Red 1 x 10 ea Red 1 x 10 each Red 1 x 10 each   Tband - ER/IR   NV Red 1 x 10 ea Red 1 x 10 each     Shrugs/Rolls/ Retractions 1 x 10 ea 1 x 10 each 1 x 10 ea  1 x 10 each 1 x 10 each   Wall Pushups 1 x 10 1 x 10 1 x 10 1 x 10 1 x 10    Wall Slides - flex/abd 1 x 10 ea 1 x 10 each 1 x 10 ea  1 x 10 each 1 x 10 each   Isometrics - all planes 5" x 5 ea 5" x 5 each 5" x 5 ea 5" 1 x 5 each     Stand - FF and Abd             Table Slides - flex/abd             Corner Stretch             Supine Cane TE - flex/abd/ER Flex/abd/ER  1 x 10 ea Flex/Abd/ER  1 x 10 each Flex/abd/ER  1 x 10 ea Flex/Abd 1 x 10 each Flex/Abd/ 1 x 10 each   Supine Serratus Punches 1 x 10 1 x 10  1 x 10  1 x 10  1 x 10    S/L ER and Abd             Prone - flex/ext/rows/ horiz abd             Prone - T & V's             Ball Roll on Wall-up/down/L/R/cw/ccw             Tband - Punches                   Modalities 10-17-19 10/22/19 10-25-19 10/28/19 10/15/19   Estim/IFC with HP/CP HP  10' HP 10 minutes HP 10 min HP only   10 minutes HP 10 minutes   Ultrasound - R trap/shoulder 10 minutes, 50%, 1 mHz, 1 2 w/cm 10 minutes  50%, 1 mHz, 1 2 w/cm 10 min  D/C 10 minutes, 50%, 1 mHz, 1 2 w/cm

## 2019-10-25 NOTE — PROGRESS NOTES
Daily Note     Today's date: 10/25/2019  Patient name: Vazquez Ruiz  : 1960  MRN: [id number]  Referring provider: April Haddad MD  Dx:   Encounter Diagnosis     ICD-10-CM    1  Arthralgia of right shoulder region M25 511               Subjective: My sh doesn't hurt at rest but I get sharp pain of 8-9/10 with reaching and my sleep is also interrupted by the pain in my sh      Objective: See treatment diary below    Assessment: Tolerated treatment well  Pt reports pain fluctuations between 5/10 to 8/10 t/o ex  PROM extremely limited in all planes by pain  Tenderness with palpation and pressure of US head at supraspinatus  Patient would benefit from continued PT    Plan: Continue per plan of care        Precautions: None  Re-Eval DUE: 10/26/19  Specialty Daily Treatment Diary     Manual  10-17-19 10/22/2019 10-25-19 10/10/19 10/15/19   PROM - all planes - R shdr 10' 10 minutes 10' 10 minutes 10 minutes       Exercise Diary  10-17-19 10/22/19 10-25-19 10/10/19 10/15/19   UBE Alt 10' Alt 10 minutes Alt 10' Alt 10 minutes Alt 10 minutes   Pulleys - flex/abd 1 x 30 ea 1 x 30 each 1 x 30 ea 1 x 30 each 1 x 30 each   Finger Ladder - flex/abd 1 x 3 ea 1 x 3 each 1 x 3 ea  1 x 3 each 1 x 3 each   Pendulums - all directions 1 x 10 D/C  DC  1 x 10 each 1 x 10 each   MTP/LTP Red 1 x 10 ea Red 1 x 10 each Red 1 x 10 ea  Red 1 x 10 each   Tband - ER/IR  NV Red 1 x 10 ea     Shrugs/Rolls/ Retractions 1 x 10 ea 1 x 10 each 1 x 10 ea  1 x 10 each 1 x 10 each   Wall Pushups 1 x 10 1 x 10 1 x 10  1 x 10    Wall Slides - flex/abd 1 x 10 ea 1 x 10 each 1 x 10 ea  1 x 10 each 1 x 10 each   Isometrics - all planes 5" x 5 ea 5" x 5 each 5" x 5 ea     Stand - FF and Abd        Table Slides - flex/abd        Corner Stretch        Supine Cane TE - flex/abd/ER Flex/abd/ER  1 x 10 ea Flex/Abd/ER  1 x 10 each Flex/abd/ER  1 x 10 ea Flex/Abd 1 x 10 each Flex/Abd/ 1 x 10 each   Supine Serratus Punches 1 x 10 1 x 10  1 x 10  1 x 10  1 x 10    S/L ER and Abd        Prone - flex/ext/rows/ horiz abd        Prone - T & V's        Ball Roll on Wall-up/down/L/R/cw/ccw        Tband - Punches            Modalities 10-17-19 10/22/19 10-25-19 10/10/19 10/15/19   Estim/IFC with HP/CP HP  10' HP 10 minutes HP 10 min HP 10 minutes HP 10 minutes   Ultrasound - R trap/shoulder 10 minutes, 50%, 1 mHz, 1 2 w/cm 10 minutes  50%, 1 mHz, 1 2 w/cm 10 min  10 minutes 10 minutes, 50%, 1 mHz, 1 2 w/cm

## 2019-10-28 ENCOUNTER — EVALUATION (OUTPATIENT)
Dept: PHYSICAL THERAPY | Facility: HOME HEALTHCARE | Age: 59
End: 2019-10-28
Payer: OTHER GOVERNMENT

## 2019-10-28 DIAGNOSIS — M25.511 ARTHRALGIA OF RIGHT SHOULDER REGION: Primary | ICD-10-CM

## 2019-10-28 PROCEDURE — 97110 THERAPEUTIC EXERCISES: CPT | Performed by: PHYSICAL THERAPIST

## 2019-10-28 PROCEDURE — 97164 PT RE-EVAL EST PLAN CARE: CPT | Performed by: PHYSICAL THERAPIST

## 2019-10-28 PROCEDURE — 97140 MANUAL THERAPY 1/> REGIONS: CPT | Performed by: PHYSICAL THERAPIST

## 2019-10-28 NOTE — LETTER
2019    [address] Pankaj Frost MD  [address] [facility] [address]    Patient: Canelo Peña   YOB: 1960   Date of Visit: 10/28/2019     Encounter Diagnosis     ICD-10-CM    1  Arthralgia of right shoulder region M25 511        Dear Dr Pankaj Frost: Thank you for your recent referral of Canelo Peña  Please review the attached evaluation summary from Aguila's recent visit  Please verify that you agree with the plan of care by signing the attached order  If you have any questions or concerns, please do not hesitate to call  I sincerely appreciate the opportunity to share in the care of one of your patients and hope to have another opportunity to work with you in the near future  Sincerely,    Molly Zamarripa, PT      Referring Provider:      I certify that I have read the below Plan of Care and certify the need for these services furnished under this plan of treatment while under my care  [address] MD Lex  [address] [place] [address]: [phone]          PT Re-Evaluation     Today's date: 10/28/19  Patient name: Canelo Peña  : 1960  MRN: [id number]  Referring provider: Elysia Barragan MD  Dx:   Encounter Diagnosis     ICD-10-CM    1  Arthralgia of right shoulder region M25 511                   Assessment  Assessment details: The patient has been seen in PT for a total of 9 visits since [facility] with good PT attendance noted  He still has complaints of constant pain with most pain being along the front of his shoulder  He has made improvements since [facility] but continues to demonstrate deficits with decreased A/PROM and strength, decreased postural awareness, decreased flexibility, difficulty sleeping and pain with completing his ADLs  He still has pain with lifting and reaching forward/going forward with his arm  He still has pain with completing his ADLs and activities at home    Most pain remains with lifting and reaching and he still has difficulty with completing OH tasks  Pain also noted when reaching out to the side and behind his back  Sleep can still be interrupted, laying a certain way will increase pain in his shoulder and wake him up  TTP remains along his AC joint and along the front of his shoulder  He is making progress towards his goals and has met some STGs  The patient may benefit from continued PT to address deficits and improve function  Tx to include ROM, stretching, strengthening, modalities, HEP, pt education, postural ed, lifting/body mechanics, neuro re-ed, balance/proprioception Te, MT and equipment  Will progress with TE for ROM, stretching, and strengthening in upcoming visits as able  Also spoke with patient about contacting doctor to get an appointment for his shoulder  Impairments: abnormal or restricted ROM, activity intolerance, impaired physical strength, lacks appropriate home exercise program, pain with function, weight-bearing intolerance, poor posture  and poor body mechanics  Other impairment: decreased flexibility  Functional limitations: difficulty sleeping Understanding of Dx/Px/POC: good   Prognosis: fair    Goals  STGs:  1  Initiate and complete HEP with verbal cues  - met  2  Decrease R shoulder pain by > 25% in 4 weeks  - partially met  3  Improve shoulder strength by 1/2-1 grade in 4 weeks  - partially met  4  Improve shoulder ROM by 15-20 degrees in 4 weeks  - partially met  LTGs:  1  Patient to be I with HEP in 6 weeks  2   Improve R shoulder ROM to WNL t/o in 8 weeks to improve function  3   Improve RUE strength to > or = to 4/5 t/o in 8 weeks to improve function  4   Decrease R shoulder pain to < or = to 3-4/10 with activity in 8 weeks to improve function  5   Recreational performance in related activities is improved to PLOF in 8 weeks  6   Postural control is improved to maximal level of function in 8 weeks    7   ADL performance is improved to PLOF in 8 weeks  8   Patient to report improved sleep in 8 weeks  Plan  Plan details: Modalities and therapeutic intervention prn  Patient would benefit from: skilled physical therapy  Planned modality interventions: cryotherapy, thermotherapy: hydrocollator packs, ultrasound and unattended electrical stimulation  Planned therapy interventions: manual therapy, body mechanics training, neuromuscular re-education, patient education, postural training, self care, strengthening, stretching, therapeutic activities, therapeutic exercise, flexibility and home exercise program  Frequency: 2x week  Duration in weeks: 4  Plan of Care beginning date: 10/28/2019  Plan of Care expiration date: 2019  Treatment plan discussed with: patient        Subjective Evaluation    History of Present Illness  Mechanism of injury: The patient states that he still gets pain in his shoulder though some improvement is noted since starting therapy  Most pain noted with reaching forward or overhead  He is unsure if he has another appointment with the doctor for a follow up appointment  Quality of life: fair    Pain  At best pain ratin  At worst pain ratin  Location: R Shoulder - anterior shoulder   Quality: dull ache, sharp and discomfort  Relieving factors: rest  Aggravating factors: lifting    Social Support  Lives with: alone    Employment status: not working  Hand dominance: right      Diagnostic Tests  X-ray: abnormal  Patient Goals  Patient goals for therapy: decreased pain, increased motion and increased strength  Patient goal: "To be able to get more movement in the arm, to stop the pain in the arm "         Objective     Static Posture     Head  Forward  Shoulders  Rounded      Postural Observations  Seated posture: fair  Standing posture: fair  Correction of posture: has no consistent effect        Palpation   Left   No palpable tenderness to the levator scapulae, middle trapezius, rhomboids and upper trapezius  Right   No palpable tenderness to the levator scapulae, middle trapezius and rhomboids  Tenderness of the upper trapezius  Neurological Testing     Sensation     Shoulder   Left Shoulder   Intact: light touch    Right Shoulder   Intact: light touch    Active Range of Motion   Left Shoulder   Flexion: 170 degrees   Abduction: 165 degrees   External rotation 90°:  80 degrees   Internal rotation 90°:  60 degrees     Right Shoulder   Flexion: 140 degrees with pain  Abduction: 110 degrees with pain  External rotation 90°:  50 degreeswith pain  Internal rotation 90°:  45 degrees with pain    Left Elbow   Normal active range of motion    Right Elbow   Normal active range of motion    Passive Range of Motion     Right Shoulder   Flexion: 140 degrees with pain  Abduction: 110 degrees with pain  External rotation 90°:  55 degrees with pain    Strength/Myotome Testing     Right Shoulder     Planes of Motion   Flexion: 3+   Abduction: 3-   External rotation at 90°:  3   Internal rotation at 90°:  3+     Left Elbow   Normal strength    Right Elbow   Normal strength    Tests     Right Shoulder   Positive empty can and painful arc  Negative drop arm       Ambulation     Ambulation: Level Surfaces   Ambulation without assistive device: independent         Precautions: None  Re-Eval DUE: 11/28/19  Specialty Daily Treatment Diary      Manual  10-17-19 10/22/2019 10-25-19 10/28/19 10/15/19   PROM - all planes - R shdr 10' 10 minutes 10' 10 minutes 10 minutes         Exercise Diary  10-17-19 10/22/19 10-25-19 10/28/19 10/15/19   UBE Alt 10' Alt 10 minutes Alt 10' Alt 10 minutes Alt 10 minutes   Pulleys - flex/abd 1 x 30 ea 1 x 30 each 1 x 30 ea 1 x 30 each 1 x 30 each   Finger Ladder - flex/abd 1 x 3 ea 1 x 3 each 1 x 3 ea  1 x 3 each 1 x 3 each   Pendulums - all directions 1 x 10 D/C  DC  D/C 1 x 10 each   MTP/LTP Red 1 x 10 ea Red 1 x 10 each Red 1 x 10 ea Red 1 x 10 each Red 1 x 10 each   Tband - ER/IR   NV Red 1 x 10 ea Red 1 x 10 each     Shrugs/Rolls/ Retractions 1 x 10 ea 1 x 10 each 1 x 10 ea  1 x 10 each 1 x 10 each   Wall Pushups 1 x 10 1 x 10 1 x 10 1 x 10 1 x 10    Wall Slides - flex/abd 1 x 10 ea 1 x 10 each 1 x 10 ea  1 x 10 each 1 x 10 each   Isometrics - all planes 5" x 5 ea 5" x 5 each 5" x 5 ea 5" 1 x 5 each     Stand - FF and Abd             Table Slides - flex/abd             Corner Stretch             Supine Cane TE - flex/abd/ER Flex/abd/ER  1 x 10 ea Flex/Abd/ER  1 x 10 each Flex/abd/ER  1 x 10 ea Flex/Abd 1 x 10 each Flex/Abd/ 1 x 10 each   Supine Serratus Punches 1 x 10 1 x 10  1 x 10  1 x 10  1 x 10    S/L ER and Abd             Prone - flex/ext/rows/ horiz abd             Prone - T & V's             Ball Roll on Wall-up/down/L/R/cw/ccw             Tband - Punches                   Modalities 10-17-19 10/22/19 10-25-19 10/28/19 10/15/19   Estim/IFC with HP/CP HP  10' HP 10 minutes HP 10 min HP only   10 minutes HP 10 minutes   Ultrasound - R trap/shoulder 10 minutes, 50%, 1 mHz, 1 2 w/cm 10 minutes  50%, 1 mHz, 1 2 w/cm 10 min  D/C 10 minutes, 50%, 1 mHz, 1 2 w/cm

## 2019-10-31 ENCOUNTER — OFFICE VISIT (OUTPATIENT)
Dept: PHYSICAL THERAPY | Facility: HOME HEALTHCARE | Age: 59
End: 2019-10-31
Payer: OTHER GOVERNMENT

## 2019-10-31 DIAGNOSIS — M25.511 ARTHRALGIA OF RIGHT SHOULDER REGION: Primary | ICD-10-CM

## 2019-10-31 PROCEDURE — 97140 MANUAL THERAPY 1/> REGIONS: CPT

## 2019-10-31 PROCEDURE — 97110 THERAPEUTIC EXERCISES: CPT

## 2019-10-31 NOTE — PROGRESS NOTES
Daily Note     Today's date: 10/31/2019  Patient name: Gerald Boggs  : 1960  MRN: [id number]  Referring provider: Emery Crouch MD  Dx:   Encounter Diagnosis     ICD-10-CM    1  Arthralgia of right shoulder region M25 511               Subjective: Sore and achy today mostly from the damp weather  Objective: See treatment diary below    Assessment: Tolerated treatment well  Pt remains limited with strength, endurance and ROM  PROM limited by pain and tightness  Pt reports pain fluctuations from 7/10 to 9/10 t/o ex and MT  Patient would benefit from continued PT    Plan: Continue per plan of care        Precautions: None  Re-Eval DUE: 19  Specialty Daily Treatment Diary      Manual  10-17-19 10/22/2019 10-25-19 10/28/19 10-31-19   PROM - all planes - R shdr 10' 10 minutes 10' 10 minutes 10'         Exercise Diary  10-17-19 10/22/19 10-25-19 10/28/19 10-31-19   UBE Alt 10' Alt 10 minutes Alt 10' Alt 10 minutes Alt 10'   Pulleys - flex/abd 1 x 30 ea 1 x 30 each 1 x 30 ea 1 x 30 each 1 x 30 each   Finger Ladder - flex/abd 1 x 3 ea 1 x 3 each 1 x 3 ea  1 x 3 each 1 x 3 each   Pendulums - all directions 1 x 10 D/C  DC  D/C DC   MTP/LTP Red 1 x 10 ea Red 1 x 10 each Red 1 x 10 ea Red 1 x 10 each Red 2 x 10 each   Tband - ER/IR   NV Red 1 x 10 ea Red 1 x 10 each  Red 1 x 10 ea   Tband punch     Red 1 x 10   Shrugs/Rolls/ Retractions 1 x 10 ea 1 x 10 each 1 x 10 ea  1 x 10 each 1 x 10 each   Wall Pushups 1 x 10 1 x 10 1 x 10 1 x 10 1 x 10    Wall Slides - flex/abd 1 x 10 ea 1 x 10 each 1 x 10 ea  1 x 10 each 1 x 10 each   Isometrics - all planes 5" x 5 ea 5" x 5 each 5" x 5 ea 5" 1 x 5 each  5" x 5 ea   Stand - FF and Abd             Table Slides - flex/abd             Corner Stretch             Supine Cane TE - flex/abd/ER Flex/abd/ER  1 x 10 ea Flex/Abd/ER  1 x 10 each Flex/abd/ER  1 x 10 ea Flex/Abd 1 x 10 each Flex/Abd/ 1 x 10 each   Supine Serratus Punches 1 x 10 1 x 10  1 x 10  1 x 10  DC 2* added tband punch    S/L ER and Abd             Prone - flex/ext/rows/ horiz abd             Prone - T & V's             Ball Roll on Wall-up/down/L/R/cw/ccw                   Modalities 10-17-19 10/22/19 10-25-19 10/28/19 10-31-19   Estim/IFC with HP/CP HP  10' HP 10 minutes HP 10 min HP only   10 minutes HP 10'   Ultrasound - R trap/shoulder 10 minutes, 50%, 1 mHz, 1 2 w/cm 10 minutes  50%, 1 mHz, 1 2 w/cm 10 min  D/C D/C

## 2019-11-04 ENCOUNTER — OFFICE VISIT (OUTPATIENT)
Dept: PHYSICAL THERAPY | Facility: HOME HEALTHCARE | Age: 59
End: 2019-11-04
Payer: OTHER GOVERNMENT

## 2019-11-04 DIAGNOSIS — M25.511 ARTHRALGIA OF RIGHT SHOULDER REGION: Primary | ICD-10-CM

## 2019-11-04 PROCEDURE — 97140 MANUAL THERAPY 1/> REGIONS: CPT | Performed by: PHYSICAL THERAPIST

## 2019-11-04 PROCEDURE — 97110 THERAPEUTIC EXERCISES: CPT | Performed by: PHYSICAL THERAPIST

## 2019-11-04 NOTE — PROGRESS NOTES
Daily Note     Today's date: 2019  Patient name: Vazquez Ruiz  : 1960  MRN: [id number]  Referring provider: April Haddad MD  Dx:   Encounter Diagnosis     ICD-10-CM    1  Arthralgia of right shoulder region M25 511                   Subjective: The patient states that because of the weather over the weekend he was sore  He rates pain 6-7/10 at start of session  He notes that he has an appointment at the [place] on Thursday and will speak to the doctor about his shoulder then  Objective: See treatment diary below      Assessment: The patient had pain in his shoulder with all TE/movement today  Tight end feel was noted with stretching and also with pain at end range  Progressed patient with TBand and added weight with cane TE as outlined below in daily treatment diary  HP x 10 minutes to end session and pain returned to baseline after this  Plan: Continue per plan of care  Continue to progress as able in upcoming visits         Precautions: None  Re-Eval DUE: 19  Specialty Daily Treatment Diary      Manual  11/4/19 10/22/2019 10-25-19 10/28/19 10-31-19   PROM - all planes - R shdr 10 minutes 10 minutes 10' 10 minutes 10'         Exercise Diary  11/4/19 10/22/19 10-25-19 10/28/19 10-31-19   UBE Alt 10 minutes Alt 10 minutes Alt 10' Alt 10 minutes Alt 10'   Pulleys - flex/abd 1 x 30 each 1 x 30 each 1 x 30 ea 1 x 30 each 1 x 30 each   Finger Ladder - flex/abd 1 x 3 each 1 x 3 each 1 x 3 ea  1 x 3 each 1 x 3 each   Pendulums - all directions D/C D/C  DC  D/C DC   MTP/LTP Blue 2 x 10 each Red 1 x 10 each Red 1 x 10 ea Red 1 x 10 each Red 2 x 10 each   Tband - ER/IR Blue 1 x 10 each NV Red 1 x 10 ea Red 1 x 10 each  Red 1 x 10 ea   Tband punch Blue 1 x 10     Red 1 x 10   Shrugs/Rolls/ Retractions 1 x 10 ea 1 x 10 each 1 x 10 ea  1 x 10 each 1 x 10 each   Wall Pushups 1 x 10 1 x 10 1 x 10 1 x 10 1 x 10    Wall Slides - flex/abd 1 x 10 ea 1 x 10 each 1 x 10 ea  1 x 10 each 1 x 10 each   Isometrics - all planes 5" x 5 ea 5" x 5 each 5" x 5 ea 5" 1 x 5 each  5" x 5 ea   Stand - FF and Abd             Table Slides - flex/abd             Corner Stretch             Supine Cane TE - flex/abd/ER Flex/abd/ER  1 x 10 each  2# Flex/Abd/ER  1 x 10 each Flex/abd/ER  1 x 10 ea Flex/Abd 1 x 10 each Flex/Abd/ 1 x 10 each   Supine Serratus Punches D/C 1 x 10  1 x 10  1 x 10  DC 2* added tband punch    S/L ER and Abd             Prone - flex/ext/rows/ horiz abd             Prone - T & V's             Ball Roll on Wall-up/down/L/R/cw/ccw                   Modalities 11/4/19 10/22/19 10-25-19 10/28/19 10-31-19   Estim/IFC with HP/CP HP only  10 minutes HP 10 minutes HP 10 min HP only   10 minutes HP 10'   Ultrasound - R trap/shoulder D/C 10 minutes  50%, 1 mHz, 1 2 w/cm 10 min  D/C D/C

## 2019-11-08 ENCOUNTER — OFFICE VISIT (OUTPATIENT)
Dept: PHYSICAL THERAPY | Facility: HOME HEALTHCARE | Age: 59
End: 2019-11-08
Payer: OTHER GOVERNMENT

## 2019-11-08 DIAGNOSIS — M25.511 ARTHRALGIA OF RIGHT SHOULDER REGION: Primary | ICD-10-CM

## 2019-11-08 PROCEDURE — 97110 THERAPEUTIC EXERCISES: CPT

## 2019-11-08 PROCEDURE — 97140 MANUAL THERAPY 1/> REGIONS: CPT

## 2019-11-08 NOTE — PROGRESS NOTES
Daily Note     Today's date: 2019  Patient name: Emilia Silver  : 1960  MRN: [id number]  Referring provider: Sussy Braswell MD  Dx:   Encounter Diagnosis     ICD-10-CM    1  Arthralgia of right shoulder region M25 511               Subjective: Pain of 7/10 this morning and reaching is still the worst      Objective: See treatment diary below    Assessment: Tolerated treatment well  Able to increase reps of most ex today with minimal increase in s/s  Pt with pain and tightness in all planes  Pt reports minimal improvements in strength and pain free ROM since SOC  Patient would benefit from continued PT    Plan: Continue per plan of care        Precautions: None  Re-Eval DUE: 19  Specialty Daily Treatment Diary      Manual  11/4/19 11-8-19 10-25-19 10/28/19 10-31-19   PROM - all planes - R shdr 10 minutes 10' 10' 10 minutes 10'         Exercise Diary  11/4/19 11-8-19 10-25-19 10/28/19 10-31-19   UBE Alt 10 minutes Alt 10' Alt 10' Alt 10 minutes Alt 10'   Pulleys - flex/abd 1 x 30 each 1 x 30 each 1 x 30 ea 1 x 30 each 1 x 30 each   Finger Ladder - flex/abd 1 x 3 each 1 x 3 each 1 x 3 ea  1 x 3 each 1 x 3 each   Pendulums - all directions D/C D/C  DC  D/C DC   MTP/LTP Blue 2 x 10 each Blue 2 x 10 each Red 1 x 10 ea Red 1 x 10 each Red 2 x 10 each   Tband - ER/IR Blue 1 x 10 each Blue 1 x 15 ea Red 1 x 10 ea Red 1 x 10 each  Red 1 x 10 ea   Tband punch Blue 1 x 10  Blue 1 x 15   Red 1 x 10   Shrugs/Rolls/ Retractions 1 x 10 ea 1 x 10 each 1 x 10 ea  1 x 10 each 1 x 10 each   Wall Pushups 1 x 10 1 x 15 1 x 10 1 x 10 1 x 10    Wall Slides - flex/abd 1 x 10 ea 1 x 15 each 1 x 10 ea  1 x 10 each 1 x 10 each   Isometrics - all planes 5" x 5 ea 5" x 5 each 5" x 5 ea 5" 1 x 5 each  5" x 5 ea   Stand - FF and Abd             Table Slides - flex/abd             Corner Stretch             Supine Cane TE - flex/abd/ER Flex/abd/ER  1 x 10 each  2# Flex/Abd/ER  1 x 10 each  2# Flex/abd/ER  1 x 10 ea Flex/Abd 1 x 10 each Flex/Abd/ 1 x 10 each   Supine Serratus Punches D/C DC 1 x 10  1 x 10  DC 2* added tband punch    S/L ER and Abd             Prone - flex/ext/rows/ horiz abd             Prone - T & V's             Ball Roll on Wall-up/down/L/R/cw/ccw                   Modalities 11/4/19 11-8-19 10-25-19 10/28/19 10-31-19   Estim/IFC with HP/CP HP only  10 minutes HP 10 minutes HP 10 min HP only   10 minutes HP 10'   Ultrasound - R trap/shoulder D/C DC 10 min  D/C D/C

## 2019-11-11 ENCOUNTER — APPOINTMENT (OUTPATIENT)
Dept: PHYSICAL THERAPY | Facility: HOME HEALTHCARE | Age: 59
End: 2019-11-11
Payer: OTHER GOVERNMENT

## 2019-11-12 ENCOUNTER — OFFICE VISIT (OUTPATIENT)
Dept: PHYSICAL THERAPY | Facility: HOME HEALTHCARE | Age: 59
End: 2019-11-12
Payer: OTHER GOVERNMENT

## 2019-11-12 DIAGNOSIS — M25.511 ARTHRALGIA OF RIGHT SHOULDER REGION: Primary | ICD-10-CM

## 2019-11-12 PROCEDURE — 97110 THERAPEUTIC EXERCISES: CPT

## 2019-11-12 PROCEDURE — 97140 MANUAL THERAPY 1/> REGIONS: CPT

## 2019-11-12 NOTE — PROGRESS NOTES
Daily Note     Today's date: 2019  Patient name: David Meraz  : 1960  MRN: [id number]  Referring provider: Duyen Mckenzie MD  Dx:   Encounter Diagnosis     ICD-10-CM    1  Arthralgia of right shoulder region M25 511                   Subjective:       Objective: See treatment diary below      Assessment:       Plan: Continue per plan of care        Precautions: None  Re-Eval DUE: 19  Specialty Daily Treatment Diary      Manual  11/4/19 11-8-19 11/12/19 10/28/19 10-31-19   PROM - all planes - R shdr 10 minutes 10' 10 minutes 10 minutes 10'         Exercise Diary  11/4/19 11-8-19 11/12/19 10/28/19 10-31-19   UBE Alt 10 minutes Alt 10' Alt 10 minutes Alt 10 minutes Alt 10'   Pulleys - flex/abd 1 x 30 each 1 x 30 each 1 x 30 each 1 x 30 each 1 x 30 each   Finger Ladder - flex/abd 1 x 3 each 1 x 3 each 1 x 3 each 1 x 3 each 1 x 3 each   Pendulums - all directions D/C D/C  DC  D/C DC   MTP/LTP Blue 2 x 10 each Blue 2 x 10 each Blue 2 x 10 each Red 1 x 10 each Red 2 x 10 each   Tband - ER/IR Blue 1 x 10 each Blue 1 x 15 ea Blue 1 x 15 each Red 1 x 10 each  Red 1 x 10 ea   Tband punch Blue 1 x 10  Blue 1 x 15 Blue 1 x 15  Red 1 x 10   Shrugs/Rolls/ Retractions 1 x 10 ea 1 x 10 each 1 x 10 each  1 x 10 each 1 x 10 each   Wall Pushups 1 x 10 1 x 15 1 x 15 1 x 10 1 x 10    Wall Slides - flex/abd 1 x 10 ea 1 x 15 each 1 x 15 each  1 x 10 each 1 x 10 each   Isometrics - all planes 5" x 5 ea 5" x 5 each 5" x 5 each 5" 1 x 5 each  5" x 5 ea   Stand - FF and Abd             Table Slides - flex/abd             Corner Stretch             Supine Cane TE - flex/abd/ER Flex/abd/ER  1 x 10 each  2# Flex/Abd/ER  1 x 10 each  2# Flex/abd/ER  1 x 10 each  2# Flex/Abd 1 x 10 each Flex/Abd/ 1 x 10 each   Supine Serratus Punches D/C DC D/C 1 x 10  DC 2* added tband punch    S/L ER and Abd             Prone - flex/ext/rows/ horiz abd             Prone - T & V's             Ball Roll on Wall-up/down/L/R/cw/ccw                 Modalities 11/4/19 11-8-19 11/12/9 10/28/19 10-31-19   Estim/IFC with HP/CP HP only  10 minutes HP 10 minutes HP 10 minutes HP only   10 minutes HP 10'   Ultrasound - R trap/shoulder D/C DC D/C  D/C D/C

## 2019-11-15 ENCOUNTER — OFFICE VISIT (OUTPATIENT)
Dept: PHYSICAL THERAPY | Facility: HOME HEALTHCARE | Age: 59
End: 2019-11-15
Payer: OTHER GOVERNMENT

## 2019-11-15 DIAGNOSIS — M25.511 ARTHRALGIA OF RIGHT SHOULDER REGION: Primary | ICD-10-CM

## 2019-11-15 PROCEDURE — 97110 THERAPEUTIC EXERCISES: CPT | Performed by: PHYSICAL THERAPIST

## 2019-11-15 PROCEDURE — 97140 MANUAL THERAPY 1/> REGIONS: CPT | Performed by: PHYSICAL THERAPIST

## 2019-11-15 NOTE — PROGRESS NOTES
Daily Note     Today's date: 11/15/2019  Patient name: Jose Stanley  : 1960  MRN: [id number]  Referring provider: Juan J Carmichael MD  Dx:   Encounter Diagnosis     ICD-10-CM    1  Arthralgia of right shoulder region M25 511                   Subjective: The patient states that he spoke with the doctor's office and he will be having an MRI on 19  He rates his pain this morning about 6-7/10 at start of session  Objective: See treatment diary below      Assessment: Tightness remains noted in his shoulder with all planes for PROM and with tight end feel for all planes  Most pain is with abduction and IR  No increase in pain noted during session  HP x 10 minutes to end session and he reports decreased pain after this  Plan: Continue per plan of care  He has one more approved visit than will need to apply for more         Precautions: None  Re-Eval DUE: 19  Specialty Daily Treatment Diary      Manual  11/4/19 11-8-19 11/12/19 11/15/19 10-31-19   PROM - all planes - R shdr 10 minutes 10' 10 min 10 minutes 10'         Exercise Diary  11/4/19 11-8-19 11/12/19 11/15/19 10-31-19   UBE Alt 10 minutes Alt 10' Alt 10' Alt 10 minutes Alt 10'   Pulleys - flex/abd 1 x 30 each 1 x 30 each 1 x 30 ea 1 x 30 each 1 x 30 each   Finger Ladder - flex/abd 1 x 3 each 1 x 3 each 1 x 3 ea  1 x 3 each 1 x 3 each   Pendulums - all directions D/C D/C  DC  D/C DC   MTP/LTP Blue 2 x 10 each Blue 2 x 10 each Red 2 x 10 ea Blue 2 x 10 each Red 2 x 10 each   Tband - ER/IR Blue 1 x 10 each Blue 1 x 15 ea Red 2 x 10 ea Blue 2 x 10 each  Red 1 x 10 ea   Tband punch Blue 1 x 10  Blue 1 x 15 Blue 1 x 15  Blue 1 x 15 Red 1 x 10   Shrugs/Rolls/ Retractions 1 x 10 ea 1 x 10 each 1 x 10 ea  1 x 10 each 1 x 10 each   Wall Pushups 1 x 10 1 x 15 1 x 15 1 x 15 1 x 10    Wall Slides - flex/abd 1 x 10 ea 1 x 15 each 1 x 15 ea  1 x 15 each 1 x 10 each   Isometrics - all planes 5" x 5 ea 5" x 5 each 5" x 5 ea 5" 1 x 5 each  5" x 5 ea   Stand - FF and Abd             Table Slides - flex/abd             Corner Stretch             Supine Cane TE - flex/abd/ER Flex/abd/ER  1 x 10 each  2# Flex/Abd/ER  1 x 10 each  2# Flex/abd/ER  1 x 10 ea 2# Flex/Abd 1 x 10 each 2# Flex/Abd/ 1 x 10 each   Supine Serratus Punches D/C DC DC D/C DC 2* added tband punch    S/L ER and Abd             Prone - flex/ext/rows/ horiz abd             Prone - T & V's             Ball Roll on Wall-up/down/L/R/cw/ccw                   Modalities 11/4/19 11-8-19 11/12/19 11/15/19 10-31-19   Estim/IFC with HP/CP HP only  10 minutes HP 10 minutes HP 10 min HP only   10 minutes HP 10'   Ultrasound - R trap/shoulder D/C DC DC D/C D/C

## 2019-11-20 ENCOUNTER — OFFICE VISIT (OUTPATIENT)
Dept: PHYSICAL THERAPY | Facility: HOME HEALTHCARE | Age: 59
End: 2019-11-20
Payer: OTHER GOVERNMENT

## 2019-11-20 DIAGNOSIS — M25.511 ARTHRALGIA OF RIGHT SHOULDER REGION: Primary | ICD-10-CM

## 2019-11-20 PROCEDURE — 97110 THERAPEUTIC EXERCISES: CPT

## 2019-11-20 PROCEDURE — 97140 MANUAL THERAPY 1/> REGIONS: CPT

## 2019-11-20 NOTE — PROGRESS NOTES
Daily Note     Today's date: 2019  Patient name: Emilia Silver  : 1960  MRN: [id number]  Referring provider: Sussy Braswell MD  Dx:   Encounter Diagnosis     ICD-10-CM    1  Arthralgia of right shoulder region M25 511               Subjective: Pt reports he has pain in his R shoulder  Objective: See treatment diary below      Assessment: Tolerated treatment fairly well  A few verbal cues needed t/o TE to perform correctly  Pain level same t/o session  Tightness still noted R shoulder all directions with PROM  Patient would benefit from continued PT      Plan: Continue per plan of care        Precautions: None  Re-Eval DUE: 19  Specialty Daily Treatment Diary      Manual  11/4/19 11-8-19 11/12/19 11/15/19 11/20/19   PROM - all planes - R shdr 10 minutes 10' 10 min 10 minutes 10 min          Exercise Diary  11/4/19 11-8-19 11/12/19 11/15/19 11/20/19   UBE Alt 10 minutes Alt 10' Alt 10' Alt 10 minutes Alt 10 min   Pulleys - flex/abd 1 x 30 each 1 x 30 each 1 x 30 ea 1 x 30 each 1 x 30 each   Finger Ladder - flex/abd 1 x 3 each 1 x 3 each 1 x 3 ea  1 x 3 each 1 x 3 each   Pendulums - all directions D/C D/C  DC  D/C DC   MTP/LTP Blue 2 x 10 each Blue 2 x 10 each Red 2 x 10 ea Blue 2 x 10 each Blue  2 x 10 each   Tband - ER/IR Blue 1 x 10 each Blue 1 x 15 ea Red 2 x 10 ea Blue 2 x 10 each Blue  2 x 10 ea   Tband punch Blue 1 x 10  Blue 1 x 15 Blue 1 x 15  Blue 1 x 15 Blue  1 x 15   Shrugs/Rolls/ Retractions 1 x 10 ea 1 x 10 each 1 x 10 ea  1 x 10 each 1 x 10 each   Wall Pushups 1 x 10 1 x 15 1 x 15 1 x 15 1 x 15   Wall Slides - flex/abd 1 x 10 ea 1 x 15 each 1 x 15 ea  1 x 15 each 1 x 15 each   Isometrics - all planes 5" x 5 ea 5" x 5 each 5" x 5 ea 5" 1 x 5 each  5" x 5 ea   Stand - FF and Abd             Table Slides - flex/abd             Corner Stretch             Supine Cane TE - flex/abd/ER Flex/abd/ER  1 x 10 each  2# Flex/Abd/ER  1 x 10 each  2# Flex/abd/ER  1 x 10 ea 2# Flex/Abd 1 x 10 each 2# Flex/Abd/ 1 x 10 each 2#   Supine Serratus Punches D/C DC DC D/C DC    S/L ER and Abd             Prone - flex/ext/rows/ horiz abd             Prone - T & V's             Ball Roll on Wall-up/down/L/R/cw/ccw                   Modalities 11/4/19 11-8-19 11/12/19 11/15/19 11/20/19   Estim/IFC with HP/CP HP only  10 minutes HP 10 minutes HP 10 min HP only   10 minutes HP 10 min    Ultrasound - R trap/shoulder D/C DC DC D/C D/C

## 2019-12-09 NOTE — PROGRESS NOTES
PT Re-Evaluation     Today's date: 12/10/19  Patient name: Roxy Rodriguez  : 1960  MRN: [id number]  Referring provider: Jeff Saldaña MD  Dx:   Encounter Diagnosis     ICD-10-CM    1  Arthralgia of right shoulder region M25 511                   Assessment  Assessment details: The patient has been seen in PT for a total of 16 visits since [facility] with good PT attendance noted and today is his first visit since 19 secondary to insurance coverage  He continues to have complaints of constant pain with most pain being along the front of his shoulder  He has made overall improvements since [facility] compared to his last re-eval but since then has lost some ROM in his shoulder  Pain level remains unchanged since his last re-eval   He demonstrates deficits with decreased A/PROM and strength, decreased postural awareness, decreased flexibility, difficulty sleeping and pain with completing his ADLs and tasks at home  He still has most pain with lifting and reaching forward/going forward with his arm  He is fine with completing tasks in front of his body at waist level though he has increased pain with any OH activity or when reaching behind his back or out to the side  Difficulty sleeping is still noted and when laying in a certain position pain in his shoulder can wake him up  The patient would benefit from continued PT to address deficits and improve function  Tx to include ROM, stretching, strengthening, modalities, HEP, pt education, postural ed, lifting/body mechanics, neuro re-ed, balance/proprioception Te, MT and equipment  Plan to continue with PT until his next doctor appointment and to await any recommendations  Depending on results of MRI, patient feels he may need surgery            Impairments: abnormal or restricted ROM, activity intolerance, impaired physical strength, lacks appropriate home exercise program, pain with function, weight-bearing intolerance, poor posture  and poor body mechanics  Other impairment: decreased flexibility  Functional limitations: difficulty sleeping Understanding of Dx/Px/POC: good   Prognosis: fair    Goals  STGs:  1  Initiate and complete HEP with verbal cues  - met  2  Decrease R shoulder pain by > 25% in 4 weeks  - partially met  3  Improve shoulder strength by 1/2-1 grade in 4 weeks  - partially met  4  Improve shoulder ROM by 15-20 degrees in 4 weeks  - partially met  LTGs:  1  Patient to be I with HEP in 6 weeks  2   Improve R shoulder ROM to WNL t/o in 8 weeks to improve function  3   Improve RUE strength to > or = to 4/5 t/o in 8 weeks to improve function  4   Decrease R shoulder pain to < or = to 3-4/10 with activity in 8 weeks to improve function  5   Recreational performance in related activities is improved to PLOF in 8 weeks  6   Postural control is improved to maximal level of function in 8 weeks  7   ADL performance is improved to PLOF in 8 weeks  8   Patient to report improved sleep in 8 weeks  Plan  Plan details: Modalities and therapeutic intervention prn  Patient would benefit from: skilled physical therapy  Planned modality interventions: cryotherapy, thermotherapy: hydrocollator packs, ultrasound and unattended electrical stimulation  Planned therapy interventions: manual therapy, body mechanics training, neuromuscular re-education, patient education, postural training, self care, strengthening, stretching, therapeutic activities, therapeutic exercise, flexibility and home exercise program  Frequency: 2x week  Duration in weeks: 4  Plan of Care beginning date: 12/10/2019  Plan of Care expiration date: 1/10/2020  Treatment plan discussed with: patient        Subjective Evaluation    History of Present Illness  Mechanism of injury: The patient states he got the MRI yesterday on his shoulder  He is to see the doctor again in January, this time will be seeing the orthopedic doctor    He notes that he still has pain with lifting, reaching OH, forward and behind his back  "It still feels tight at times "     Quality of life: fair    Pain  At best pain ratin  At worst pain ratin  Location: R Shoulder - anterior shoulder   Quality: dull ache, sharp and discomfort  Relieving factors: rest  Aggravating factors: lifting    Social Support  Lives with: alone    Employment status: not working  Hand dominance: right      Diagnostic Tests  X-ray: abnormal  Patient Goals  Patient goals for therapy: decreased pain, increased motion and increased strength  Patient goal: "To be able to get more movement in the arm, to stop the pain in the arm "         Objective     Static Posture     Head  Forward  Shoulders  Rounded  Postural Observations  Seated posture: fair  Standing posture: fair  Correction of posture: has no consistent effect        Palpation   Left   No palpable tenderness to the levator scapulae, middle trapezius, rhomboids and upper trapezius  Right   No palpable tenderness to the levator scapulae, middle trapezius and rhomboids  Tenderness of the upper trapezius       Neurological Testing     Sensation     Shoulder   Left Shoulder   Intact: light touch    Right Shoulder   Intact: light touch    Active Range of Motion   Left Shoulder   Flexion: 170 degrees   Abduction: 165 degrees   External rotation 90°: 80 degrees   Internal rotation 90°: 60 degrees     Right Shoulder   Flexion: 130 degrees with pain  Abduction: 105 degrees with pain  External rotation 90°: 45 degreeswith pain  Internal rotation 90°: 30 degrees with pain    Left Elbow   Normal active range of motion    Right Elbow   Normal active range of motion    Passive Range of Motion     Right Shoulder   Flexion: 136 degrees with pain  Abduction: 111 degrees with pain  External rotation 90°: 49 degrees with pain    Strength/Myotome Testing     Right Shoulder     Planes of Motion   Flexion: 3+   Abduction: 3-   External rotation at 90°: 3   Internal rotation at 90°: 3+     Left Elbow   Normal strength    Right Elbow   Normal strength    Tests     Right Shoulder   Positive empty can and painful arc  Negative drop arm       Ambulation     Ambulation: Level Surfaces   Ambulation without assistive device: independent         Precautions: None  Re-Eval DUE: 1/10/20  Specialty Daily Treatment Diary      Manual  12/10/19 11-8-19 11/12/19 11/15/19 11/20/19   PROM - all planes - R shdr 10 minutes 10' 10 min 10 minutes 10 min          Exercise Diary  12/10/19 11-8-19 11/12/19 11/15/19 11/20/19   UBE Alt 10 minutes Alt 10' Alt 10' Alt 10 minutes Alt 10 min   Pulleys - flex/abd 1 x 30 each 1 x 30 each 1 x 30 ea 1 x 30 each 1 x 30 each   Finger Ladder - flex/abd 1 x 3 each 1 x 3 each 1 x 3 ea  1 x 3 each 1 x 3 each   Pendulums - all directions D/C D/C  DC  D/C DC   MTP/LTP Blue 2 x 10 each Blue 2 x 10 each Red 2 x 10 ea Blue 2 x 10 each Blue  2 x 10 each   Tband - ER/IR Blue 1 x 10 each Blue 1 x 15 ea Red 2 x 10 ea Blue 2 x 10 each Blue  2 x 10 ea   Tband punch Blue 1 x 15  Blue 1 x 15 Blue 1 x 15  Blue 1 x 15 Blue  1 x 15   Shrugs/Rolls/ Retractions 1 x 10 each 1 x 10 each 1 x 10 ea  1 x 10 each 1 x 10 each   Wall Pushups 1 x 15 1 x 15 1 x 15 1 x 15 1 x 15   Wall Slides - flex/abd 1 x 15 each 1 x 15 each 1 x 15 ea  1 x 15 each 1 x 15 each   Isometrics - all planes 5" x 5 ea 5" x 5 each 5" x 5 ea 5" 1 x 5 each  5" x 5 ea   Stand - FF and Abd             Table Slides - flex/abd             Corner Stretch             Supine Cane TE - flex/abd/ER Flex/abd/ER  1 x 10 each  2# Flex/Abd/ER  1 x 10 each  2# Flex/abd/ER  1 x 10 ea 2# Flex/Abd 1 x 10 each 2# Flex/Abd/ 1 x 10 each 2#   Supine Serratus Punches D/C DC DC D/C DC    S/L ER and Abd             Prone - flex/ext/rows/ horiz abd             Prone - T & V's             Ball Roll on Wall-up/down/L/R/cw/ccw                   Modalities 12/10/19 11-8-19 11/12/19 11/15/19 11/20/19   Estim/IFC with HP/CP HP only  10 minutes HP 10 minutes HP 10 min HP only   10 minutes HP 10 min    Ultrasound - R trap/shoulder D/C DC DC D/C D/C

## 2019-12-10 ENCOUNTER — EVALUATION (OUTPATIENT)
Dept: PHYSICAL THERAPY | Facility: HOME HEALTHCARE | Age: 59
End: 2019-12-10
Payer: OTHER GOVERNMENT

## 2019-12-10 ENCOUNTER — TRANSCRIBE ORDERS (OUTPATIENT)
Dept: PHYSICAL THERAPY | Facility: HOME HEALTHCARE | Age: 59
End: 2019-12-10

## 2019-12-10 DIAGNOSIS — M25.511 ARTHRALGIA OF RIGHT SHOULDER REGION: Primary | ICD-10-CM

## 2019-12-10 PROCEDURE — 97164 PT RE-EVAL EST PLAN CARE: CPT | Performed by: PHYSICAL THERAPIST

## 2019-12-10 PROCEDURE — 97140 MANUAL THERAPY 1/> REGIONS: CPT | Performed by: PHYSICAL THERAPIST

## 2019-12-10 PROCEDURE — 97110 THERAPEUTIC EXERCISES: CPT | Performed by: PHYSICAL THERAPIST

## 2019-12-10 NOTE — LETTER
December 10, 2019    [address] Barbara Mcintosh MD  [address] [facility] [address]    Patient: Nathaniel Lugo   YOB: 1960   Date of Visit: 12/10/2019     Encounter Diagnosis     ICD-10-CM    1  Arthralgia of right shoulder region M25 511        Dear Dr Barbara Mcintosh: Thank you for your recent referral of Nathaniel Lugo  Please review the attached evaluation summary from Aguila's recent visit  Please verify that you agree with the plan of care by signing the attached order  If you have any questions or concerns, please do not hesitate to call  I sincerely appreciate the opportunity to share in the care of one of your patients and hope to have another opportunity to work with you in the near future  Sincerely,    Placido Peacock, PT      Referring Provider:      I certify that I have read the below Plan of Care and certify the need for these services furnished under this plan of treatment while under my care  [address] MD Lex  [address] [place] [address] Street: [phone]          PT Re-Evaluation     Today's date: 12/10/19  Patient name: Nathaniel Lugo  : 1960  MRN: [id number]  Referring provider: Twila Bran MD  Dx:   Encounter Diagnosis     ICD-10-CM    1  Arthralgia of right shoulder region M25 511                   Assessment  Assessment details: The patient has been seen in PT for a total of 16 visits since [facility] with good PT attendance noted and today is his first visit since 19 secondary to insurance coverage  He continues to have complaints of constant pain with most pain being along the front of his shoulder  He has made overall improvements since [facility] compared to his last re-eval but since then has lost some ROM in his shoulder    Pain level remains unchanged since his last re-eval   He demonstrates deficits with decreased A/PROM and strength, decreased postural awareness, decreased flexibility, difficulty sleeping and pain with completing his ADLs and tasks at home  He still has most pain with lifting and reaching forward/going forward with his arm  He is fine with completing tasks in front of his body at waist level though he has increased pain with any OH activity or when reaching behind his back or out to the side  Difficulty sleeping is still noted and when laying in a certain position pain in his shoulder can wake him up  The patient would benefit from continued PT to address deficits and improve function  Tx to include ROM, stretching, strengthening, modalities, HEP, pt education, postural ed, lifting/body mechanics, neuro re-ed, balance/proprioception Te, MT and equipment  Plan to continue with PT until his next doctor appointment and to await any recommendations  Depending on results of MRI, patient feels he may need surgery  Impairments: abnormal or restricted ROM, activity intolerance, impaired physical strength, lacks appropriate home exercise program, pain with function, weight-bearing intolerance, poor posture  and poor body mechanics  Other impairment: decreased flexibility  Functional limitations: difficulty sleeping Understanding of Dx/Px/POC: good   Prognosis: fair    Goals  STGs:  1  Initiate and complete HEP with verbal cues  - met  2  Decrease R shoulder pain by > 25% in 4 weeks  - partially met  3  Improve shoulder strength by 1/2-1 grade in 4 weeks  - partially met  4  Improve shoulder ROM by 15-20 degrees in 4 weeks  - partially met  LTGs:  1  Patient to be I with HEP in 6 weeks  2   Improve R shoulder ROM to WNL t/o in 8 weeks to improve function  3   Improve RUE strength to > or = to 4/5 t/o in 8 weeks to improve function  4   Decrease R shoulder pain to < or = to 3-4/10 with activity in 8 weeks to improve function  5   Recreational performance in related activities is improved to PLOF in 8 weeks    6   Postural control is improved to maximal level of function in 8 weeks   7   ADL performance is improved to PLOF in 8 weeks  8   Patient to report improved sleep in 8 weeks  Plan  Plan details: Modalities and therapeutic intervention prn  Patient would benefit from: skilled physical therapy  Planned modality interventions: cryotherapy, thermotherapy: hydrocollator packs, ultrasound and unattended electrical stimulation  Planned therapy interventions: manual therapy, body mechanics training, neuromuscular re-education, patient education, postural training, self care, strengthening, stretching, therapeutic activities, therapeutic exercise, flexibility and home exercise program  Frequency: 2x week  Duration in weeks: 4  Plan of Care beginning date: 12/10/2019  Plan of Care expiration date: 1/10/2020  Treatment plan discussed with: patient        Subjective Evaluation    History of Present Illness  Mechanism of injury: The patient states he got the MRI yesterday on his shoulder  He is to see the doctor again in January, this time will be seeing the orthopedic doctor  He notes that he still has pain with lifting, reaching OH, forward and behind his back  "It still feels tight at times "     Quality of life: fair    Pain  At best pain ratin  At worst pain ratin  Location: R Shoulder - anterior shoulder   Quality: dull ache, sharp and discomfort  Relieving factors: rest  Aggravating factors: lifting    Social Support  Lives with: alone    Employment status: not working  Hand dominance: right      Diagnostic Tests  X-ray: abnormal  Patient Goals  Patient goals for therapy: decreased pain, increased motion and increased strength  Patient goal: "To be able to get more movement in the arm, to stop the pain in the arm "         Objective     Static Posture     Head  Forward  Shoulders  Rounded      Postural Observations  Seated posture: fair  Standing posture: fair  Correction of posture: has no consistent effect        Palpation   Left   No palpable tenderness to the levator scapulae, middle trapezius, rhomboids and upper trapezius  Right   No palpable tenderness to the levator scapulae, middle trapezius and rhomboids  Tenderness of the upper trapezius  Neurological Testing     Sensation     Shoulder   Left Shoulder   Intact: light touch    Right Shoulder   Intact: light touch    Active Range of Motion   Left Shoulder   Flexion: 170 degrees   Abduction: 165 degrees   External rotation 90°:  80 degrees   Internal rotation 90°:  60 degrees     Right Shoulder   Flexion: 130 degrees with pain  Abduction: 105 degrees with pain  External rotation 90°:  45 degreeswith pain  Internal rotation 90°:  30 degrees with pain    Left Elbow   Normal active range of motion    Right Elbow   Normal active range of motion    Passive Range of Motion     Right Shoulder   Flexion: 136 degrees with pain  Abduction: 111 degrees with pain  External rotation 90°:  49 degrees with pain    Strength/Myotome Testing     Right Shoulder     Planes of Motion   Flexion: 3+   Abduction: 3-   External rotation at 90°:  3   Internal rotation at 90°:  3+     Left Elbow   Normal strength    Right Elbow   Normal strength    Tests     Right Shoulder   Positive empty can and painful arc  Negative drop arm       Ambulation     Ambulation: Level Surfaces   Ambulation without assistive device: independent         Precautions: None  Re-Eval DUE: 1/10/20  Specialty Daily Treatment Diary      Manual  12/10/19 11-8-19 11/12/19 11/15/19 11/20/19   PROM - all planes - R shdr 10 minutes 10' 10 min 10 minutes 10 min          Exercise Diary  12/10/19 11-8-19 11/12/19 11/15/19 11/20/19   UBE Alt 10 minutes Alt 10' Alt 10' Alt 10 minutes Alt 10 min   Pulleys - flex/abd 1 x 30 each 1 x 30 each 1 x 30 ea 1 x 30 each 1 x 30 each   Finger Ladder - flex/abd 1 x 3 each 1 x 3 each 1 x 3 ea  1 x 3 each 1 x 3 each   Pendulums - all directions D/C D/C  DC  D/C DC   MTP/LTP Blue 2 x 10 each Blue 2 x 10 each Red 2 x 10 ea Blue 2 x 10 each Blue  2 x 10 each   Tband - ER/IR Blue 1 x 10 each Blue 1 x 15 ea Red 2 x 10 ea Blue 2 x 10 each Blue  2 x 10 ea   Tband punch Blue 1 x 15  Blue 1 x 15 Blue 1 x 15  Blue 1 x 15 Blue  1 x 15   Shrugs/Rolls/ Retractions 1 x 10 each 1 x 10 each 1 x 10 ea  1 x 10 each 1 x 10 each   Wall Pushups 1 x 15 1 x 15 1 x 15 1 x 15 1 x 15   Wall Slides - flex/abd 1 x 15 each 1 x 15 each 1 x 15 ea  1 x 15 each 1 x 15 each   Isometrics - all planes 5" x 5 ea 5" x 5 each 5" x 5 ea 5" 1 x 5 each  5" x 5 ea   Stand - FF and Abd             Table Slides - flex/abd             Corner Stretch             Supine Cane TE - flex/abd/ER Flex/abd/ER  1 x 10 each  2# Flex/Abd/ER  1 x 10 each  2# Flex/abd/ER  1 x 10 ea 2# Flex/Abd 1 x 10 each 2# Flex/Abd/ 1 x 10 each 2#   Supine Serratus Punches D/C DC DC D/C DC    S/L ER and Abd             Prone - flex/ext/rows/ horiz abd             Prone - T & V's             Ball Roll on Wall-up/down/L/R/cw/ccw                   Modalities 12/10/19 11-8-19 11/12/19 11/15/19 11/20/19   Estim/IFC with HP/CP HP only  10 minutes HP 10 minutes HP 10 min HP only   10 minutes HP 10 min    Ultrasound - R trap/shoulder D/C DC DC D/C D/C

## 2019-12-12 ENCOUNTER — APPOINTMENT (OUTPATIENT)
Dept: PHYSICAL THERAPY | Facility: HOME HEALTHCARE | Age: 59
End: 2019-12-12
Payer: OTHER GOVERNMENT

## 2019-12-13 ENCOUNTER — OFFICE VISIT (OUTPATIENT)
Dept: PHYSICAL THERAPY | Facility: HOME HEALTHCARE | Age: 59
End: 2019-12-13
Payer: OTHER GOVERNMENT

## 2019-12-13 DIAGNOSIS — M25.511 ARTHRALGIA OF RIGHT SHOULDER REGION: Primary | ICD-10-CM

## 2019-12-13 PROCEDURE — 97110 THERAPEUTIC EXERCISES: CPT

## 2019-12-13 PROCEDURE — 97140 MANUAL THERAPY 1/> REGIONS: CPT

## 2019-12-13 NOTE — PROGRESS NOTES
Daily Note     Today's date: 2019  Patient name: Twin Mejia  : 1960  MRN: [id number]  Referring provider: Kris Cordero MD  Dx:   Encounter Diagnosis     ICD-10-CM    1  Arthralgia of right shoulder region M25 511               Subjective: Pt reports 7/10 pain in his R shoulder  Objective: See treatment diary below      Assessment: Tolerated treatment fairly well  Pt reports difficulty and pain with reaching OH and behind his back  Tightness noted R shoulder all directions for PROM  No increased pain reported t/o session  Patient would benefit from continued PT      Plan: Continue per plan of care        Precautions: None  Re-Eval DUE: 1/10/20  Specialty Daily Treatment Diary      Manual  12/10/19 12/13/19 11/12/19 11/15/19 11/20/19   PROM - all planes - R shdr 10 minutes 10' 10 min 10 minutes 10 min          Exercise Diary  12/10/19 12/13/19 11/12/19 11/15/19 11/20/19   UBE Alt 10 minutes Alt 10' Alt 10' Alt 10 minutes Alt 10 min   Pulleys - flex/abd 1 x 30 each 1 x 30 each 1 x 30 ea 1 x 30 each 1 x 30 each   Finger Ladder - flex/abd 1 x 3 each 1 x 3 each 1 x 3 ea  1 x 3 each 1 x 3 each   Pendulums - all directions D/C D/C  DC  D/C DC   MTP/LTP Blue 2 x 10 each Blue 2 x 10 each Red 2 x 10 ea Blue 2 x 10 each Blue  2 x 10 each   Tband - ER/IR Blue 1 x 10 each Blue 1 x 15 ea Red 2 x 10 ea Blue 2 x 10 each Blue  2 x 10 ea   Tband punch Blue 1 x 15  Blue 1 x 15 Blue 1 x 15  Blue 1 x 15 Blue  1 x 15   Shrugs/Rolls/ Retractions 1 x 10 each 1 x 10 each 1 x 10 ea  1 x 10 each 1 x 10 each   Wall Pushups 1 x 15 1 x 15 1 x 15 1 x 15 1 x 15   Wall Slides - flex/abd 1 x 15 each 1 x 15 each 1 x 15 ea  1 x 15 each 1 x 15 each   Isometrics - all planes 5" x 5 ea 5" x 5 each 5" x 5 ea 5" 1 x 5 each  5" x 5 ea   Stand - FF and Abd             Table Slides - flex/abd             Corner Stretch             Supine Cane TE - flex/abd/ER Flex/abd/ER  1 x 10 each  2# Flex/Abd/ER  1 x 10 each  2# Flex/abd/ER  1 x 10 ea 2# Flex/Abd 1 x 10 each 2# Flex/Abd/ 1 x 10 each 2#   Supine Serratus Punches D/C DC DC D/C DC    S/L ER and Abd             Prone - flex/ext/rows/ horiz abd             Prone - T & V's             Ball Roll on Wall-up/down/L/R/cw/ccw                   Modalities 12/10/19 12/13/19 11/12/19 11/15/19 11/20/19   Estim/IFC with HP/CP HP only  10 minutes HP only     10 minutes HP 10 min HP only   10 minutes HP 10 min    Ultrasound - R trap/shoulder D/C DC DC D/C D/C

## 2019-12-16 ENCOUNTER — OFFICE VISIT (OUTPATIENT)
Dept: PHYSICAL THERAPY | Facility: HOME HEALTHCARE | Age: 59
End: 2019-12-16
Payer: OTHER GOVERNMENT

## 2019-12-16 DIAGNOSIS — M25.511 ARTHRALGIA OF RIGHT SHOULDER REGION: Primary | ICD-10-CM

## 2019-12-16 PROCEDURE — 97110 THERAPEUTIC EXERCISES: CPT | Performed by: PHYSICAL THERAPIST

## 2019-12-16 PROCEDURE — 97140 MANUAL THERAPY 1/> REGIONS: CPT | Performed by: PHYSICAL THERAPIST

## 2019-12-16 NOTE — PROGRESS NOTES
Daily Note     Today's date: 2019  Patient name: Ramy Lau  : 1960  MRN: [id number]  Referring provider: Danielle Car MD  Dx:   Encounter Diagnosis     ICD-10-CM    1  Arthralgia of right shoulder region M25 511                   Subjective: The patient states that his shoulder is sore today  He notes, "I didn't do anything out of the ordinary, I relaxed over the weekend  Maybe I'm sore from the snowy weather "  He notes that he still did not get the results of the MRI  Objective: See treatment diary below      Assessment: The patient with fair tolerance to TE today  He continues to be limited with shoulder A/PROM for all planes  Pain and tightness noted at end range for all motions  HP x 10 minutes to end session and he continues to report slight relief after use of heat  Instructed him to use heat at home as needed to help with pain control  Plan: Continue per plan of care        Precautions: None  Re-Eval DUE: 1/10/20  Specialty Daily Treatment Diary      Manual  12/10/19 12/13/19 12/16/19 11/15/19 11/20/19   PROM - all planes - R shdr 10 minutes 10' 10 minutes 10 minutes 10 min          Exercise Diary  12/10/19 12/13/19 12/16/19 11/15/19 11/20/19   UBE Alt 10 minutes Alt 10' Alt 10 minutes Alt 10 minutes Alt 10 min   Pulleys - flex/abd 1 x 30 each 1 x 30 each 1 x 30 each 1 x 30 each 1 x 30 each   Finger Ladder - flex/abd 1 x 3 each 1 x 3 each 1 x 3 each 1 x 3 each 1 x 3 each   Pendulums - all directions D/C D/C  DC  D/C DC   MTP/LTP Blue 2 x 10 each Blue 2 x 10 each Blue 2 x 10 each Blue 2 x 10 each Blue  2 x 10 each   Tband - ER/IR Blue 1 x 10 each Blue 1 x 15 ea Blue 2 x 10 ea Blue 2 x 10 each Blue  2 x 10 ea   Tband punch Blue 1 x 15  Blue 1 x 15 Blue 1 x 15  Blue 1 x 15 Blue  1 x 15   Shrugs/Rolls/ Retractions 1 x 10 each 1 x 10 each 1 x 10 each 1 x 10 each 1 x 10 each   Wall Pushups 1 x 15 1 x 15 1 x 15 1 x 15 1 x 15   Wall Slides - flex/abd 1 x 15 each 1 x 15 each 1 x 15 each  1 x 15 each 1 x 15 each   Isometrics - all planes 5" x 5 ea 5" x 5 each 5" x 5 each 5" 1 x 5 each  5" x 5 ea   Stand - FF and Abd             Table Slides - flex/abd             Corner Stretch             Supine Cane TE - flex/abd/ER Flex/abd/ER  1 x 10 each  2# Flex/Abd/ER  1 x 10 each  2# Flex/abd/ER  1 x 10 ea 2# Flex/Abd 1 x 10 each 2# Flex/Abd/ 1 x 10 each 2#   Supine Serratus Punches D/C DC DC D/C DC    S/L ER and Abd             Prone - flex/ext/rows/ horiz abd             Prone - T & V's             Ball Roll on Wall-up/down/L/R/cw/ccw                   Modalities 12/10/19 12/13/19 12/16/19 11/15/19 11/20/19   Estim/IFC with HP/CP HP only  10 minutes HP only     10 minutes HP 10 minutes HP only   10 minutes HP 10 min    Ultrasound - R trap/shoulder D/C DC DC D/C D/C

## 2019-12-19 ENCOUNTER — APPOINTMENT (OUTPATIENT)
Dept: PHYSICAL THERAPY | Facility: HOME HEALTHCARE | Age: 59
End: 2019-12-19
Payer: OTHER GOVERNMENT

## 2019-12-26 ENCOUNTER — APPOINTMENT (OUTPATIENT)
Dept: PHYSICAL THERAPY | Facility: HOME HEALTHCARE | Age: 59
End: 2019-12-26
Payer: OTHER GOVERNMENT

## 2020-01-07 ENCOUNTER — OFFICE VISIT (OUTPATIENT)
Dept: PHYSICAL THERAPY | Facility: HOME HEALTHCARE | Age: 60
End: 2020-01-07
Payer: OTHER GOVERNMENT

## 2020-01-07 DIAGNOSIS — M25.511 ARTHRALGIA OF RIGHT SHOULDER REGION: Primary | ICD-10-CM

## 2020-01-07 PROCEDURE — 97140 MANUAL THERAPY 1/> REGIONS: CPT

## 2020-01-07 PROCEDURE — 97110 THERAPEUTIC EXERCISES: CPT

## 2020-01-07 NOTE — PROGRESS NOTES
PT Re-Evaluation     Today's date: 2020  Patient name: Jodee Wheeler  : 1960  MRN: [id number]  Referring provider: Scott Felix MD  Dx:   Encounter Diagnosis     ICD-10-CM    1  Arthralgia of right shoulder region M25 511                   Assessment  Assessment details: The patient has been seen in PT for a total of 20 visits since [facility]  He was only seen for three visits since his last re-eval   He notes that he had seen the doctor earlier this week and did get an injection in his shoulder with less pain since then  He continues to make steady improvements since [facility]  He still has complaints of pain in his shoulder though less since getting the shot earlier this week  He continues to demonstrate deficits with decreased A/PROM and strength, decreased postural awareness, decreased flexibility, difficulty sleeping and pain with completing his ADLs and tasks at home  He still has most pain with lifting and reaching forward/going forward with his arm or reaching behind his back  He is fine with completing tasks in front of his body at waist level though he has increased pain with any OH activity or when reaching behind his back or out to the side  Difficulty sleeping is still noted and when laying in a certain position pain in his shoulder can wake him up  The patient would benefit from continued PT to address deficits and improve function  Tx to include ROM, stretching, strengthening, modalities, HEP, pt education, postural ed, lifting/body mechanics, neuro re-ed, balance/proprioception Te, MT and equipment  Plan to progress with ROM, stretching, strengthening and HEP in upcoming visits      Impairments: abnormal or restricted ROM, activity intolerance, impaired physical strength, lacks appropriate home exercise program, pain with function, weight-bearing intolerance, poor posture  and poor body mechanics  Other impairment: decreased flexibility  Functional limitations: difficulty sleeping Understanding of Dx/Px/POC: good   Prognosis: fair    Goals  STGs:  1  Initiate and complete HEP with verbal cues  - met  2  Decrease R shoulder pain by > 25% in 4 weeks  - partially met  3  Improve shoulder strength by 1/2-1 grade in 4 weeks  - partially met  4  Improve shoulder ROM by 15-20 degrees in 4 weeks  - partially met  LTGs:  1  Patient to be I with HEP in 6 weeks  2   Improve R shoulder ROM to WNL t/o in 8 weeks to improve function  3   Improve RUE strength to > or = to 4/5 t/o in 8 weeks to improve function  4   Decrease R shoulder pain to < or = to 3-4/10 with activity in 8 weeks to improve function  5   Recreational performance in related activities is improved to PLOF in 8 weeks  6   Postural control is improved to maximal level of function in 8 weeks  7   ADL performance is improved to PLOF in 8 weeks  8   Patient to report improved sleep in 8 weeks  Plan  Plan details: Modalities and therapeutic intervention prn  Patient would benefit from: skilled physical therapy  Planned modality interventions: cryotherapy, thermotherapy: hydrocollator packs, unattended electrical stimulation and ultrasound  Planned therapy interventions: manual therapy, body mechanics training, neuromuscular re-education, patient education, postural training, self care, strengthening, stretching, therapeutic activities, therapeutic exercise, flexibility and home exercise program  Frequency: 2x week  Duration in weeks: 4  Plan of Care beginning date: 1/9/2020  Plan of Care expiration date: 2/9/2020  Treatment plan discussed with: patient        Subjective Evaluation    History of Present Illness  Mechanism of injury: The patient states that he got a shot in his shoulder earlier this week and feels like it helped with his pain  Less pain since getting the shot  He will be going back to see the doctor in 2-3 months and states that depending on how he is feeling he may get another shot         Quality of life: fair    Pain  At best pain ratin  At worst pain ratin  Location: R Shoulder - anterior shoulder   Quality: dull ache, sharp and discomfort  Relieving factors: rest  Aggravating factors: lifting    Social Support  Lives with: alone    Employment status: not working  Hand dominance: right      Diagnostic Tests  X-ray: abnormal  Patient Goals  Patient goals for therapy: decreased pain, increased motion and increased strength  Patient goal: "To be able to get more movement in the arm, to stop the pain in the arm "         Objective     Static Posture     Head  Forward  Shoulders  Rounded  Postural Observations  Seated posture: fair  Standing posture: fair  Correction of posture: has no consistent effect        Palpation   Left   No palpable tenderness to the levator scapulae, middle trapezius, rhomboids and upper trapezius  Right   No palpable tenderness to the levator scapulae, middle trapezius and rhomboids  Tenderness of the upper trapezius       Neurological Testing     Sensation     Shoulder   Left Shoulder   Intact: light touch    Right Shoulder   Intact: light touch    Active Range of Motion   Left Shoulder   Flexion: 170 degrees   Abduction: 165 degrees   External rotation 90°: 80 degrees   Internal rotation 90°: 60 degrees     Right Shoulder   Flexion: 144 degrees with pain  Abduction: 115 degrees with pain  External rotation 90°: 54 degreeswith pain  Internal rotation 90°: 42 degrees with pain    Left Elbow   Normal active range of motion    Right Elbow   Normal active range of motion    Passive Range of Motion     Right Shoulder   Flexion: 145 degrees with pain  Abduction: 120 degrees with pain  External rotation 90°: 55 degrees with pain    Strength/Myotome Testing     Right Shoulder     Planes of Motion   Flexion: 3+   Abduction: 3   External rotation at 90°: 3+   Internal rotation at 90°: 4-     Left Elbow   Normal strength    Right Elbow   Normal strength    Tests     Right Shoulder   Positive empty can and painful arc  Negative drop arm       Ambulation     Ambulation: Level Surfaces   Ambulation without assistive device: independent         Precautions: None  Re-Eval DUE: 2/9/20  Specialty Daily Treatment Diary      Manual  12/10/19 12/13/19 12/16/19 1/7/20 1/9/20   PROM - all planes - R shdr 10 minutes 10' 10 minutes 10 minutes 10 minutes         Exercise Diary  12/10/19 12/13/19 12/16/19 1/7/ 20 1/9/20   UBE Alt 10 minutes Alt 10' Alt 10 minutes Alt 10 minutes Alt 10 minutes   Pulleys - flex/abd 1 x 30 each 1 x 30 each 1 x 30 each 1 x 30 each 1 x 30 each   Finger Ladder - flex/abd 1 x 3 each 1 x 3 each 1 x 3 each 1 x 3 each 1 x 3 each  D/C NV   MTP/LTP Blue 2 x 10 each Blue 2 x 10 each Blue 2 x 10 each Blue 2 x 10 each Blue  2 x 10 each Black NV   Tband - ER/IR Blue 1 x 10 each Blue 1 x 15 ea Blue 2 x 10 ea Blue 2 x 10 each Blue  2 x 10 ea   Tband punch Blue 1 x 15  Blue 1 x 15 Blue 1 x 15  Blue 1 x 15 Blue  1 x 15  Black NV   Shrugs/Rolls/ Retractions 1 x 10 each 1 x 10 each 1 x 10 each 1 x 10 each 1 x 10 each  Weight NV   Wall Pushups 1 x 15 1 x 15 1 x 15 1 x 15 1 x 15   Wall Slides - flex/abd 1 x 15 each 1 x 15 each 1 x 15 each  1 x 15 each 1 x 15 each   Isometrics - all planes 5" x 5 ea 5" x 5 each 5" x 5 each 5" 1 x 5 each  5" x 5 ea   Stand - FF and Abd         NV   Table Slides - flex/abd             Corner Stretch             Supine Cane TE - flex/abd/ER Flex/abd/ER  1 x 10 each  2# Flex/Abd/ER  1 x 10 each  2# Flex/abd/ER  1 x 10 ea 2# Flex/Abd 1 x 10 each 2# Flex/Abd/ 1 x 10 each 2#  3# NV   S/L ER and Abd         NV   Prone - flex/ext/rows         NV   Prone - T & V's             Ball Roll on Wall-up/down/L/R/cw/ccw         NV         Modalities 12/10/19 12/13/19 12/16/19 1/7/20 1/9/20   Estim/IFC with HP/CP HP only  10 minutes HP only     10 minutes HP 10 minutes CP only   10 minutes HP 10 minutes    Ultrasound - R trap/shoulder D/C DC DC D/C D/C

## 2020-01-07 NOTE — PROGRESS NOTES
Daily Note     Today's date: 2020  Patient name: Naman Jaffe  : 1960  MRN: [id number]  Referring provider: Ruth Trammell MD  Dx:   Encounter Diagnosis     ICD-10-CM    1  Arthralgia of right shoulder region M25 511        Start Time: 1103          Subjective: Injected yesterday with good results  Pain level down to 6/10  States I can move it much better today  Objective: See treatment diary below      Assessment: Tolerated treatment well and ROM remains limited actively and passively  Patient exhibited good technique with therapeutic exercises and would benefit from continued PT      Plan: Continue per plan of care  Progress note during next visit        Precautions: None  Re-Eval DUE: 1/10/20  Specialty Daily Treatment Diary      Manual  12/10/19 12/13/19 12/16/19 1/7/20 11/20/19   PROM - all planes - R shdr 10 minutes 10' 10 minutes 10 minutes 10 min          Exercise Diary  12/10/19 12/13/19 12/16/19 1/7/ 20 11/20/19   UBE Alt 10 minutes Alt 10' Alt 10 minutes Alt 10 minutes Alt 10 min   Pulleys - flex/abd 1 x 30 each 1 x 30 each 1 x 30 each 1 x 30 each 1 x 30 each   Finger Ladder - flex/abd 1 x 3 each 1 x 3 each 1 x 3 each 1 x 3 each 1 x 3 each   Pendulums - all directions D/C D/C  DC  D/C DC   MTP/LTP Blue 2 x 10 each Blue 2 x 10 each Blue 2 x 10 each Blue 2 x 10 each Blue  2 x 10 each   Tband - ER/IR Blue 1 x 10 each Blue 1 x 15 ea Blue 2 x 10 ea Blue 2 x 10 each Blue  2 x 10 ea   Tband punch Blue 1 x 15  Blue 1 x 15 Blue 1 x 15  Blue 1 x 15 Blue  1 x 15   Shrugs/Rolls/ Retractions 1 x 10 each 1 x 10 each 1 x 10 each 1 x 10 each 1 x 10 each   Wall Pushups 1 x 15 1 x 15 1 x 15 1 x 15 1 x 15   Wall Slides - flex/abd 1 x 15 each 1 x 15 each 1 x 15 each  1 x 15 each 1 x 15 each   Isometrics - all planes 5" x 5 ea 5" x 5 each 5" x 5 each 5" 1 x 5 each  5" x 5 ea   Stand - FF and Abd             Table Slides - flex/abd             Corner Stretch             Supine Cane TE - flex/abd/ER Flex/abd/ER  1 x 10 each  2# Flex/Abd/ER  1 x 10 each  2# Flex/abd/ER  1 x 10 ea 2# Flex/Abd 1 x 10 each 2# Flex/Abd/ 1 x 10 each 2#   Supine Serratus Punches D/C DC DC D/C DC    S/L ER and Abd             Prone - flex/ext/rows/ horiz abd             Prone - T & V's             Ball Roll on Wall-up/down/L/R/cw/ccw                   Modalities 12/10/19 12/13/19 12/16/19 1/7/20 11/20/19   Estim/IFC with HP/CP HP only  10 minutes HP only     10 minutes HP 10 minutes CP only   10 minutes HP 10 min    Ultrasound - R trap/shoulder D/C DC DC D/C D/C

## 2020-01-09 ENCOUNTER — EVALUATION (OUTPATIENT)
Dept: PHYSICAL THERAPY | Facility: HOME HEALTHCARE | Age: 60
End: 2020-01-09
Payer: OTHER GOVERNMENT

## 2020-01-09 ENCOUNTER — TRANSCRIBE ORDERS (OUTPATIENT)
Dept: PHYSICAL THERAPY | Facility: HOME HEALTHCARE | Age: 60
End: 2020-01-09

## 2020-01-09 DIAGNOSIS — M25.511 ARTHRALGIA OF RIGHT SHOULDER REGION: Primary | ICD-10-CM

## 2020-01-09 PROCEDURE — 97140 MANUAL THERAPY 1/> REGIONS: CPT | Performed by: PHYSICAL THERAPIST

## 2020-01-09 PROCEDURE — 97110 THERAPEUTIC EXERCISES: CPT | Performed by: PHYSICAL THERAPIST

## 2020-01-09 PROCEDURE — 97164 PT RE-EVAL EST PLAN CARE: CPT | Performed by: PHYSICAL THERAPIST

## 2020-01-09 NOTE — LETTER
2020    [address] Taylor Acosta MD  [address] [facility] [address]    Patient: Roxy Rodriguez   YOB: 1960   Date of Visit: 2020     Encounter Diagnosis     ICD-10-CM    1  Arthralgia of right shoulder region M25 511        Dear Dr Taylor Acosta: Thank you for your recent referral of Roxy Rodriguez  Please review the attached evaluation summary from Aguila's recent visit  Please verify that you agree with the plan of care by signing the attached order  If you have any questions or concerns, please do not hesitate to call  I sincerely appreciate the opportunity to share in the care of one of your patients and hope to have another opportunity to work with you in the near future  Sincerely,    Julia uV, PT      Referring Provider:      I certify that I have read the below Plan of Care and certify the need for these services furnished under this plan of treatment while under my care  [address] MD Lex  [address] [place] [address] Dr: [phone]          PT Re-Evaluation     Today's date: 2020  Patient name: Roxy Rodriguez  : 1960  MRN: [id number]  Referring provider: Jeff Saldaña MD  Dx:   Encounter Diagnosis     ICD-10-CM    1  Arthralgia of right shoulder region M25 511                   Assessment  Assessment details: The patient has been seen in PT for a total of 20 visits since [facility]  He was only seen for three visits since his last re-eval   He notes that he had seen the doctor earlier this week and did get an injection in his shoulder with less pain since then  He continues to make steady improvements since [facility]  He still has complaints of pain in his shoulder though less since getting the shot earlier this week    He continues to demonstrate deficits with decreased A/PROM and strength, decreased postural awareness, decreased flexibility, difficulty sleeping and pain with completing his ADLs and tasks at home  He still has most pain with lifting and reaching forward/going forward with his arm or reaching behind his back  He is fine with completing tasks in front of his body at waist level though he has increased pain with any OH activity or when reaching behind his back or out to the side  Difficulty sleeping is still noted and when laying in a certain position pain in his shoulder can wake him up  The patient would benefit from continued PT to address deficits and improve function  Tx to include ROM, stretching, strengthening, modalities, HEP, pt education, postural ed, lifting/body mechanics, neuro re-ed, balance/proprioception Te, MT and equipment  Plan to progress with ROM, stretching, strengthening and HEP in upcoming visits  Impairments: abnormal or restricted ROM, activity intolerance, impaired physical strength, lacks appropriate home exercise program, pain with function, weight-bearing intolerance, poor posture  and poor body mechanics  Other impairment: decreased flexibility  Functional limitations: difficulty sleeping Understanding of Dx/Px/POC: good   Prognosis: fair    Goals  STGs:  1  Initiate and complete HEP with verbal cues  - met  2  Decrease R shoulder pain by > 25% in 4 weeks  - partially met  3  Improve shoulder strength by 1/2-1 grade in 4 weeks  - partially met  4  Improve shoulder ROM by 15-20 degrees in 4 weeks  - partially met  LTGs:  1  Patient to be I with HEP in 6 weeks  2   Improve R shoulder ROM to WNL t/o in 8 weeks to improve function  3   Improve RUE strength to > or = to 4/5 t/o in 8 weeks to improve function  4   Decrease R shoulder pain to < or = to 3-4/10 with activity in 8 weeks to improve function  5   Recreational performance in related activities is improved to PLOF in 8 weeks  6   Postural control is improved to maximal level of function in 8 weeks  7   ADL performance is improved to PLOF in 8 weeks        8   Patient to report improved sleep in 8 weeks  Plan  Plan details: Modalities and therapeutic intervention prn  Patient would benefit from: skilled physical therapy  Planned modality interventions: cryotherapy, thermotherapy: hydrocollator packs, unattended electrical stimulation and ultrasound  Planned therapy interventions: manual therapy, body mechanics training, neuromuscular re-education, patient education, postural training, self care, strengthening, stretching, therapeutic activities, therapeutic exercise, flexibility and home exercise program  Frequency: 2x week  Duration in weeks: 4  Plan of Care beginning date: 2020  Plan of Care expiration date: 2020  Treatment plan discussed with: patient        Subjective Evaluation    History of Present Illness  Mechanism of injury: The patient states that he got a shot in his shoulder earlier this week and feels like it helped with his pain  Less pain since getting the shot  He will be going back to see the doctor in 2-3 months and states that depending on how he is feeling he may get another shot  Quality of life: fair    Pain  At best pain ratin  At worst pain ratin  Location: R Shoulder - anterior shoulder   Quality: dull ache, sharp and discomfort  Relieving factors: rest  Aggravating factors: lifting    Social Support  Lives with: alone    Employment status: not working  Hand dominance: right      Diagnostic Tests  X-ray: abnormal  Patient Goals  Patient goals for therapy: decreased pain, increased motion and increased strength  Patient goal: "To be able to get more movement in the arm, to stop the pain in the arm "         Objective     Static Posture     Head  Forward  Shoulders  Rounded  Postural Observations  Seated posture: fair  Standing posture: fair  Correction of posture: has no consistent effect        Palpation   Left   No palpable tenderness to the levator scapulae, middle trapezius, rhomboids and upper trapezius       Right   No palpable tenderness to the levator scapulae, middle trapezius and rhomboids  Tenderness of the upper trapezius  Neurological Testing     Sensation     Shoulder   Left Shoulder   Intact: light touch    Right Shoulder   Intact: light touch    Active Range of Motion   Left Shoulder   Flexion: 170 degrees   Abduction: 165 degrees   External rotation 90°:  80 degrees   Internal rotation 90°:  60 degrees     Right Shoulder   Flexion: 144 degrees with pain  Abduction: 115 degrees with pain  External rotation 90°:  54 degreeswith pain  Internal rotation 90°:  42 degrees with pain    Left Elbow   Normal active range of motion    Right Elbow   Normal active range of motion    Passive Range of Motion     Right Shoulder   Flexion: 145 degrees with pain  Abduction: 120 degrees with pain  External rotation 90°:  55 degrees with pain    Strength/Myotome Testing     Right Shoulder     Planes of Motion   Flexion: 3+   Abduction: 3   External rotation at 90°:  3+   Internal rotation at 90°:  4-     Left Elbow   Normal strength    Right Elbow   Normal strength    Tests     Right Shoulder   Positive empty can and painful arc  Negative drop arm       Ambulation     Ambulation: Level Surfaces   Ambulation without assistive device: independent         Precautions: None  Re-Eval DUE: 2/9/20  Specialty Daily Treatment Diary      Manual  12/10/19 12/13/19 12/16/19 1/7/20 1/9/20   PROM - all planes - R shdr 10 minutes 10' 10 minutes 10 minutes 10 minutes         Exercise Diary  12/10/19 12/13/19 12/16/19 1/7/ 20 1/9/20   UBE Alt 10 minutes Alt 10' Alt 10 minutes Alt 10 minutes Alt 10 minutes   Pulleys - flex/abd 1 x 30 each 1 x 30 each 1 x 30 each 1 x 30 each 1 x 30 each   Finger Ladder - flex/abd 1 x 3 each 1 x 3 each 1 x 3 each 1 x 3 each 1 x 3 each  D/C NV   MTP/LTP Blue 2 x 10 each Blue 2 x 10 each Blue 2 x 10 each Blue 2 x 10 each Blue  2 x 10 each Black NV   Tband - ER/IR Blue 1 x 10 each Blue 1 x 15 ea Blue 2 x 10 ea Blue 2 x 10 each Blue  2 x 10 ea   Tband punch Blue 1 x 15  Blue 1 x 15 Blue 1 x 15  Blue 1 x 15 Blue  1 x 15  Black NV   Shrugs/Rolls/ Retractions 1 x 10 each 1 x 10 each 1 x 10 each 1 x 10 each 1 x 10 each  Weight NV   Wall Pushups 1 x 15 1 x 15 1 x 15 1 x 15 1 x 15   Wall Slides - flex/abd 1 x 15 each 1 x 15 each 1 x 15 each  1 x 15 each 1 x 15 each   Isometrics - all planes 5" x 5 ea 5" x 5 each 5" x 5 each 5" 1 x 5 each  5" x 5 ea   Stand - FF and Abd         NV   Table Slides - flex/abd             Corner Stretch             Supine Cane TE - flex/abd/ER Flex/abd/ER  1 x 10 each  2# Flex/Abd/ER  1 x 10 each  2# Flex/abd/ER  1 x 10 ea 2# Flex/Abd 1 x 10 each 2# Flex/Abd/ 1 x 10 each 2#  3# NV   S/L ER and Abd         NV   Prone - flex/ext/rows         NV   Prone - T & V's             Ball Roll on Wall-up/down/L/R/cw/ccw         NV         Modalities 12/10/19 12/13/19 12/16/19 1/7/20 1/9/20   Estim/IFC with HP/CP HP only  10 minutes HP only     10 minutes HP 10 minutes CP only   10 minutes HP 10 minutes    Ultrasound - R trap/shoulder D/C DC DC D/C D/C

## 2020-01-14 ENCOUNTER — OFFICE VISIT (OUTPATIENT)
Dept: PHYSICAL THERAPY | Facility: HOME HEALTHCARE | Age: 60
End: 2020-01-14
Payer: OTHER GOVERNMENT

## 2020-01-14 DIAGNOSIS — M25.511 ARTHRALGIA OF RIGHT SHOULDER REGION: Primary | ICD-10-CM

## 2020-01-14 PROCEDURE — 97110 THERAPEUTIC EXERCISES: CPT | Performed by: PHYSICAL THERAPIST

## 2020-01-14 PROCEDURE — 97140 MANUAL THERAPY 1/> REGIONS: CPT | Performed by: PHYSICAL THERAPIST

## 2020-01-14 PROCEDURE — 97032 APPL MODALITY 1+ESTIM EA 15: CPT | Performed by: PHYSICAL THERAPIST

## 2020-01-14 NOTE — PROGRESS NOTES
Daily Note     Today's date: 2020  Patient name: John Molina  : 1960  MRN: [id number]  Referring provider: Jonathan Roberts MD  Dx:   Encounter Diagnosis     ICD-10-CM    1  Arthralgia of right shoulder region M25 511                   Subjective: Pt reports "it feels better since I got the injection"  He reports right shoulder pain 6/10 described as "achy"  Objective: See treatment diary below      Assessment: Tolerated treatment well  Patient exhibited good technique with therapeutic exercises  Pt reported good tolerance to increase in resistance during some strengthening activities  He requested to try e-stim today and asked therapist about thoughts regarding a home tens unit  Therapist deferred to primary therapist  Overall pt denied increase in pain post treatment  Plan: Continue per plan of care        Precautions: None  Re-Eval DUE: 20  Specialty Daily Treatment Diary      Manual  19   PROM - all planes - R shdr 10 minutes 10' 10 minutes 10 minutes 10 minutes         Exercise Diary  19   UBE Alt 10 minutes Alt 10' Alt 10 minutes Alt 10 minutes Alt 10 minutes   Pulleys - flex/abd 1 x 30 each 1 x 30 each 1 x 30 each 1 x 30 each 1 x 30 each   Finger Ladder - flex/abd D/C 1 x 3 each 1 x 3 each 1 x 3 each 1 x 3 each  D/C NV   MTP/LTP Black 2 x 10 each Blue 2 x 10 each Blue 2 x 10 each Blue 2 x 10 each Blue  2 x 10 each Black NV   Tband - ER/IR Blue 2 x 10 each Blue 1 x 15 ea Blue 2 x 10 ea Blue 2 x 10 each Blue  2 x 10 ea   Tband punch Black 1 x 20  Blue 1 x 15 Blue 1 x 15  Blue 1 x 15 Blue  1 x 15  Black NV   Shrugs/Rolls/ Retractions 1 x 10 each  3# 1 x 10 each 1 x 10 each 1 x 10 each 1 x 10 each  Weight NV   Wall Pushups 1 x 15 1 x 15 1 x 15 1 x 15 1 x 15   Wall Slides - flex/abd 1 x 15 each 1 x 15 each 1 x 15 each  1 x 15 each 1 x 15 each   Isometrics - all planes 5" x 5 ea 5" x 5 each 5" x 5 each 5" 1 x 5 each  5" x 5 ea   Stand - FF and Abd  x10 ea side        NV   Table Slides - flex/abd             Corner Stretch             Supine Cane TE - flex/abd/ER Flex/abd/ER  1 x 10 each  3# Flex/Abd/ER  1 x 10 each  2# Flex/abd/ER  1 x 10 ea 2# Flex/Abd 1 x 10 each 2# Flex/Abd/ 1 x 10 each 2#  3# NV   S/L ER and Abd         NV   Prone - flex/ext/rows         NV   Prone - T & V's             Ball Roll on Wall-up/down/L/R/cw/ccw         NV         Modalities 1/14/19 12/13/19 12/16/19 1/7/20 1/9/20   Estim/IFC with HP/CP HP/e-stim  15 minutes HP only     10 minutes HP 10 minutes CP only   10 minutes HP 10 minutes    Ultrasound - R trap/shoulder D/C DC DC D/C D/C

## 2020-01-16 ENCOUNTER — OFFICE VISIT (OUTPATIENT)
Dept: PHYSICAL THERAPY | Facility: HOME HEALTHCARE | Age: 60
End: 2020-01-16
Payer: OTHER GOVERNMENT

## 2020-01-16 DIAGNOSIS — M25.511 ARTHRALGIA OF RIGHT SHOULDER REGION: Primary | ICD-10-CM

## 2020-01-16 PROCEDURE — 97140 MANUAL THERAPY 1/> REGIONS: CPT

## 2020-01-16 PROCEDURE — 97110 THERAPEUTIC EXERCISES: CPT

## 2020-01-16 PROCEDURE — 97014 ELECTRIC STIMULATION THERAPY: CPT

## 2020-01-20 ENCOUNTER — OFFICE VISIT (OUTPATIENT)
Dept: PHYSICAL THERAPY | Facility: HOME HEALTHCARE | Age: 60
End: 2020-01-20
Payer: OTHER GOVERNMENT

## 2020-01-20 DIAGNOSIS — M25.511 ARTHRALGIA OF RIGHT SHOULDER REGION: Primary | ICD-10-CM

## 2020-01-20 PROCEDURE — 97110 THERAPEUTIC EXERCISES: CPT

## 2020-01-20 PROCEDURE — 97014 ELECTRIC STIMULATION THERAPY: CPT

## 2020-01-20 PROCEDURE — 97140 MANUAL THERAPY 1/> REGIONS: CPT

## 2020-01-20 NOTE — PROGRESS NOTES
Daily Note     Today's date: 2020  Patient name: Cynthia Becker  : 1960  MRN: [id number]  Referring provider: Yohan Mejia MD  Dx:   Encounter Diagnosis     ICD-10-CM    1  Arthralgia of right shoulder region M25 511               Subjective: My sh is more sore than usual because I had to shovel some snow  Objective: See treatment diary below    Assessment: Tolerated treatment well  Pt able to saumya increased reps of a few ex but declined adding new ex 2* pain increase from 5/10 at start to 7/10 t/o TE and MT  Pt remains with ROM limitations 2* pain in all planes  Patient would benefit from continued PT    Plan: Continue per plan of care        Precautions: None  Re-Eval DUE: 20  Specialty Daily Treatment Diary      Manual  19   PROM - all planes - R shdr 10 minutes 10' 10' 10 minutes 10 minutes         Exercise Diary  19   UBE Alt 10 minutes Alt 10' Alt 10' Alt 10 minutes Alt 10 minutes   Pulleys - flex/abd 1 x 30 each 1 x 30 each 1 x 30 ea 1 x 30 each 1 x 30 each   Finger Ladder - flex/abd D/C DC DC 1 x 3 each 1 x 3 each  D/C NV   MTP/LTP Black 2 x 10 each Black 2 x 10 each Black   2 x 10 ea Blue 2 x 10 each Blue  2 x 10 each Black NV   Tband - ER/IR Blue 2 x 10 each Blue 1 x 15 ea Blue   2 x 10 ea Blue 2 x 10 each Blue  2 x 10 ea   Tband punch Black 1 x 20  Black 1 x 15 Black   1 x 20  Blue 1 x 15 Blue  1 x 15  Black NV   Shrugs/Rolls/ Retractions 1 x 10 each  3# 3#  1 x 20 each 3# 1 x 20 each 1 x 10 each 1 x 10 each  Weight NV   Wall Pushups 1 x 15 1 x 15 1 x 15 1 x 15 1 x 15   Wall Slides - flex/abd 1 x 15 each 1 x 15 each 1 x 15 each  1 x 15 each 1 x 15 each   Isometrics - all planes 5" x 5 ea 5" x 5 each 5" x 10 each 5" 1 x 5 each  5" x 5 ea   Stand - FF and Abd  x10 ea side   x 10 ea   x 15 ea   NV   Table Slides - flex/abd             Corner Stretch             Supine Cane TE - flex/abd/ER Flex/abd/ER  1 x 10 each  3# Flex/Abd/ER  1 x 10 each  3# Flex/abd/ER  3# 1 x 10 ea  Flex/Abd 1 x 10 each 2# Flex/Abd/ 1 x 10 each 2#  3# NV   S/L ER and Abd    1 x 15   1 x 15   NV   Prone - flex/ext/rows      Declined    NV   NV   Prone - T & V's             Ball Roll on Wall-up/down/L/R/cw/ccw      Declined    NV   NV         Modalities 1/14/19 1-16-20 1-20-20 1/7/20 1/9/20   Estim/IFC with HP/CP HP/e-stim  15 minutes IFC/HP     1 5' IFC/HP   15' CP only   10 minutes HP 10 minutes    Ultrasound - R trap/shoulder D/C DC DC D/C D/C

## 2020-01-24 ENCOUNTER — OFFICE VISIT (OUTPATIENT)
Dept: PHYSICAL THERAPY | Facility: HOME HEALTHCARE | Age: 60
End: 2020-01-24
Payer: OTHER GOVERNMENT

## 2020-01-24 DIAGNOSIS — M25.511 ARTHRALGIA OF RIGHT SHOULDER REGION: Primary | ICD-10-CM

## 2020-01-24 PROCEDURE — 97140 MANUAL THERAPY 1/> REGIONS: CPT

## 2020-01-24 PROCEDURE — 97110 THERAPEUTIC EXERCISES: CPT

## 2020-01-24 PROCEDURE — 97014 ELECTRIC STIMULATION THERAPY: CPT

## 2020-01-24 NOTE — PROGRESS NOTES
Daily Note     Today's date: 2020  Patient name: Twin Mejia  : 1960  MRN: [id number]  Referring provider: Kris Cordero MD  Dx:   Encounter Diagnosis     ICD-10-CM    1  Arthralgia of right shoulder region M25 511               Subjective: My Dr prescribed a new anti inflammatory medication that I started yesterday  It has been helping and I have less overall pain  Pain of 5-6/10 today  Objective: See treatment diary below    Assessment: Tolerated treatment well  Able to add ball roll at wall without incident  Pt reports pinching sensation at sh that limits all planes of PROM  Patient would benefit from continued PT    Plan: Continue per plan of care        Precautions: None  Re-Eval DUE: 20  Specialty Daily Treatment Diary      Manual  19   PROM - all planes - R shdr 10 minutes 10' 10' 10' 10 minutes         Exercise Diary  19   UBE Alt 10 minutes Alt 10' Alt 10' Alt 10' Alt 10 minutes   Pulleys - flex/abd 1 x 30 each 1 x 30 each 1 x 30 ea 1 x 30 each 1 x 30 each   Finger Ladder - flex/abd D/C DC DC DC 1 x 3 each  D/C NV   MTP/LTP Black 2 x 10 each Black 2 x 10 each Black   2 x 10 ea Black 2 x 10 ea Blue  2 x 10 each Black NV   Tband - ER/IR Blue 2 x 10 each Blue 1 x 15 ea Blue   2 x 10 ea Black 2 x 10 ea Blue  2 x 10 ea   Tband punch Black 1 x 20  Black 1 x 15 Black   1 x 20  Black 1 x 20 Blue  1 x 15  Black NV   Shrugs/Rolls/ Retractions 1 x 10 each  3# 3#  1 x 20 each 3# 1 x 20 each 3# 1 x 20 ea 1 x 10 each  Weight NV   Wall Pushups 1 x 15 1 x 15 1 x 15 1 x 15 1 x 15   Wall Slides - flex/abd 1 x 15 each 1 x 15 each 1 x 15 each  1 x 15 ea 1 x 15 each   Isometrics - all planes 5" x 5 ea 5" x 5 each 5" x 10 each 5" 1 x 15 ea  5" x 5 ea   Stand - FF and Abd  x10 ea side   x 10 ea   x 15 ea  x 15 ea NV   Table Slides - flex/abd             Corner Stretch             Supine Cane TE - flex/abd/ER Flex/abd/ER  1 x 10 each  3# Flex/Abd/ER  1 x 10 each  3# Flex/abd/ER  3# 1 x 10 ea  Flex/Abd/ER  3# 1 x 15 ea   Flex/Abd/ 1 x 10 each 2#  3# NV   S/L ER and Abd    1 x 15   1 x 15  1 x 15  NV   Prone - flex/ext/rows      Declined    NV   NV   Prone - T & V's             Ball Roll on Wall-up/down/L/R/cw/ccw      Declined NV  1 x 10 ea NV         Modalities 1/14/19 1-16-20 1-20-20 1-24-20 1/9/20   Estim/IFC with HP/CP HP/e-stim  15 minutes IFC/HP     1 5' IFC/HP   15' IFC/HP   10 minutes HP 10 minutes    Ultrasound - R trap/shoulder D/C DC DC D/C D/C

## 2020-01-28 ENCOUNTER — OFFICE VISIT (OUTPATIENT)
Dept: PHYSICAL THERAPY | Facility: HOME HEALTHCARE | Age: 60
End: 2020-01-28
Payer: OTHER GOVERNMENT

## 2020-01-28 DIAGNOSIS — M25.511 ARTHRALGIA OF RIGHT SHOULDER REGION: Primary | ICD-10-CM

## 2020-01-28 PROCEDURE — 97110 THERAPEUTIC EXERCISES: CPT

## 2020-01-28 PROCEDURE — 97014 ELECTRIC STIMULATION THERAPY: CPT

## 2020-01-28 PROCEDURE — 97140 MANUAL THERAPY 1/> REGIONS: CPT

## 2020-01-28 NOTE — PROGRESS NOTES
Daily Note     Today's date: 2020  Patient name: Keneth Spurling  : 1960  MRN: [id number]  Referring provider: Hannah Ghosh MD  Dx:   Encounter Diagnosis     ICD-10-CM    1  Arthralgia of right shoulder region M25 511                   Subjective: Pain level 6-7/10  States it always feels terrible in this weather      Objective: See treatment diary below      Assessment: Tolerated treatment well and patient requires continued joint mobilazation to gain passive ROM  Patient would benefit from continued PT      Plan: Continue per plan of care  Progress treatment as tolerated         Precautions: None  Re-Eval DUE: 20  Specialty Daily Treatment Diary      Manual  19   PROM - all planes - R shdr 10 minutes 10' 10' 10' 10 minutes  Joint mobs         Exercise Diary  19   UBE Alt 10 minutes Alt 10' Alt 10' Alt 10' Alt 10 minutes   Pulleys - flex/abd 1 x 30 each 1 x 30 each 1 x 30 ea 1 x 30 each 1 x 30 each   Finger Ladder - flex/abd D/C DC DC DC   D/C    MTP/LTP Black 2 x 10 each Black 2 x 10 each Black   2 x 10 ea Black 2 x 10 ea  Black x 25 ea   Tband - ER/IR Blue 2 x 10 each Blue 1 x 15 ea Blue   2 x 10 ea Black 2 x 10 ea Black x 25   Tband punch Black 1 x 20  Black 1 x 15 Black   1 x 20  Black 1 x 20 Black x 25   Shrugs/Rolls/ Retractions 1 x 10 each  3# 3#  1 x 20 each 3# 1 x 20 each 3# 1 x 20 ea #4 x 30   Wall Pushups 1 x 15 1 x 15 1 x 15 1 x 15 1 x 20   Wall Slides - flex/abd 1 x 15 each 1 x 15 each 1 x 15 each  1 x 15 ea 1 x 15 each   Isometrics - all planes 5" x 5 ea 5" x 5 each 5" x 10 each 5" 1 x 15 ea  5" x 5 ea   Stand - FF and Abd  x10 ea side   x 10 ea   x 15 ea  x 15 ea 15 ea   Table Slides - flex/abd             Corner Stretch          x 5   Supine Cane TE - flex/abd/ER Flex/abd/ER  1 x 10 each  3# Flex/Abd/ER  1 x 10 each  3# Flex/abd/ER  3# 1 x 10 ea  Flex/Abd/ER  3# 1 x 15 ea   Flex/Abd/ #3 x 20   S/L ER and Abd    1 x 15   1 x 15  1 x 15  #1 x 10 ea   Prone - flex/ext/rows      Declined    NV      Prone - T & V's             Ball Roll on Wall-up/down/L/R/cw/ccw      Declined NV  1 x 10 ea 1 x 10ea         Modalities 1/14/19 1-16-20 1-20-20 1-24-20 1/28/20   Estim/IFC with HP/CP HP/e-stim  15 minutes IFC/HP     1 5' IFC/HP   15' IFC/HP   10 minutes HP 10 minutes    Ultrasound - R trap/shoulder D/C DC DC D/C D/C

## 2020-01-31 ENCOUNTER — APPOINTMENT (OUTPATIENT)
Dept: PHYSICAL THERAPY | Facility: HOME HEALTHCARE | Age: 60
End: 2020-01-31
Payer: OTHER GOVERNMENT

## 2020-02-03 ENCOUNTER — APPOINTMENT (OUTPATIENT)
Dept: PHYSICAL THERAPY | Facility: HOME HEALTHCARE | Age: 60
End: 2020-02-03
Payer: OTHER GOVERNMENT

## 2020-02-04 ENCOUNTER — OFFICE VISIT (OUTPATIENT)
Dept: PHYSICAL THERAPY | Facility: HOME HEALTHCARE | Age: 60
End: 2020-02-04
Payer: OTHER GOVERNMENT

## 2020-02-04 DIAGNOSIS — M25.511 ARTHRALGIA OF RIGHT SHOULDER REGION: Primary | ICD-10-CM

## 2020-02-04 PROCEDURE — 97014 ELECTRIC STIMULATION THERAPY: CPT | Performed by: PHYSICAL THERAPIST

## 2020-02-04 PROCEDURE — 97140 MANUAL THERAPY 1/> REGIONS: CPT | Performed by: PHYSICAL THERAPIST

## 2020-02-04 PROCEDURE — 97110 THERAPEUTIC EXERCISES: CPT | Performed by: PHYSICAL THERAPIST

## 2020-02-04 NOTE — PROGRESS NOTES
Daily Note     Today's date: 2020  Patient name: Zuleima Burroughs  : 1960  MRN: [id number]  Referring provider: Aleah Maldonado MD  Dx:   Encounter Diagnosis     ICD-10-CM    1  Arthralgia of right shoulder region M25 511                   Subjective: The patient states that his shoulder is sore today from the rainy weather  "It's achy today, pain is 5-6/10 at start of session "  He will be going back to the doctor next week and states he may be getting a TENS unit for at home  Objective: See treatment diary below      Assessment: The patient with good tolerance to TE today  Some verbal cues are needed for correct form with completing TE  Overall improved ROM noted since [facility] but still tight end feel with PROM  Continued PT may be beneficial to improve function  Plan: Continue per plan of care        Precautions: None  Re-Eval DUE: 20  Specialty Daily Treatment Diary      Manual  20   PROM - all planes - R shdr 10 minutes 10' 10' 10' 10 minutes  Joint mobs         Exercise Diary  20   UBE Alt 10 minutes Alt 10' Alt 10' Alt 10' Alt 10 minutes   Pulleys - flex/abd 1 x 30 each 1 x 30 each 1 x 30 ea 1 x 30 each 1 x 30 each   MTP/LTP Black 1 x 25 each Black 2 x 10 each Black   2 x 10 ea Black 2 x 10 ea  Black x 25 ea   Tband - ER/IR Black x 25 each Blue 1 x 15 ea Blue   2 x 10 ea Black 2 x 10 ea Black x 25   Tband punch Black 1 x 25  Black 1 x 15 Black   1 x 20  Black 1 x 20 Black x 25   Shrugs/Rolls/ Retractions 1 x 30 each  4# 3#  1 x 20 each 3# 1 x 20 each 3# 1 x 20 ea #4 x 30   Wall Pushups 1 x 20 1 x 15 1 x 15 1 x 15 1 x 20   Wall Slides - flex/abd 1 x 15 each 1 x 15 each 1 x 15 each  1 x 15 ea 1 x 15 each   Isometrics - all planes 5" x 5 each 5" x 5 each 5" x 10 each 5" 1 x 15 ea  5" x 5 ea   Stand - FF and Abd 1 x 15 each  x 10 ea   x 15 ea  x 15 ea 15 ea   Table Slides - flex/abd             Corner Stretch 20" x 5        x 5   Supine Cane TE - flex/abd/ER Flex/Abd/ER  1 x 20 each  3# Flex/Abd/ER  1 x 10 each  3# Flex/abd/ER  3# 1 x 10 ea  Flex/Abd/ER  3# 1 x 15 ea   Flex/Abd/ #3 x 20   S/L ER and Abd 1# 1 x 10 each  1 x 15   1 x 15  1 x 15  #1 x 10 ea   Prone - flex/ext/rows      Declined    NV      Prone - T & V's             Ball Roll on Wall-up/down/L/R/cw/ccw 1 x 10 each    Declined NV  1 x 10 ea 1 x 10ea         Modalities 2/4/20 1-16-20 1-20-20 1-24-20 1/28/20   Estim/IFC with HP/CP IFC/HP  15 minutes IFC/HP   15' IFC/HP   15' IFC/HP   10 minutes HP 10 minutes    Ultrasound - R trap/shoulder D/C DC DC D/C D/C

## 2020-02-06 ENCOUNTER — OFFICE VISIT (OUTPATIENT)
Dept: PHYSICAL THERAPY | Facility: HOME HEALTHCARE | Age: 60
End: 2020-02-06
Payer: OTHER GOVERNMENT

## 2020-02-06 DIAGNOSIS — M25.511 ARTHRALGIA OF RIGHT SHOULDER REGION: Primary | ICD-10-CM

## 2020-02-06 PROCEDURE — 97014 ELECTRIC STIMULATION THERAPY: CPT

## 2020-02-06 PROCEDURE — 97110 THERAPEUTIC EXERCISES: CPT

## 2020-02-06 PROCEDURE — 97140 MANUAL THERAPY 1/> REGIONS: CPT

## 2020-02-06 NOTE — PROGRESS NOTES
Daily Note     Today's date: 2020  Patient name: Sonia Farias  : 1960  MRN: [id number]  Referring provider: Emile Olivera MD  Dx:   Encounter Diagnosis     ICD-10-CM    1  Arthralgia of right shoulder region M25 511               Subjective: A little more sore today from the weather  Objective: See treatment diary below    Assessment: Tolerated treatment well  Pt slowly improving with strength and ROM  Able to saumya added wt with standing FF/ABD today  Patient would benefit from continued PT    Plan: Continue per plan of care        Precautions: None  Re-Eval DUE: 20  Specialty Daily Treatment Diary      Manual  20   PROM - all planes - R shdr 10 minutes 10' 10' 10' 10 minutes  Joint mobs         Exercise Diary  20   UBE Alt 10 minutes Alt 10' Alt 10' Alt 10' Alt 10 minutes   Pulleys - flex/abd 1 x 30 each 1 x 30 each 1 x 30 ea 1 x 30 each 1 x 30 each   MTP/LTP Black 1 x 25 each Black 1 x 25 ea Black   2 x 10 ea Black 2 x 10 ea  Black x 25 ea   Tband - ER/IR Black x 25 each Black 1 x 25 ea Blue   2 x 10 ea Black 2 x 10 ea Black x 25   Tband punch Black 1 x 25  Black 1 x 15 Black   1 x 20  Black 1 x 20 Black x 25   Shrugs/Rolls/ Retractions 1 x 30 each  4# 4#  1 x 20 each 3# 1 x 20 each 3# 1 x 20 ea #4 x 30   Wall Pushups 1 x 20 1 x 20 1 x 15 1 x 15 1 x 20   Wall Slides - flex/abd 1 x 15 each 1 x 15 each 1 x 15 each  1 x 15 ea 1 x 15 each   Isometrics - all planes 5" x 5 each 5" x 5 each 5" x 10 each 5" 1 x 15 ea  5" x 5 ea   Stand - FF and Abd 1 x 15 each  1# 1x 15 ea   x 15 ea  x 15 ea 15 ea   Table Slides - flex/abd             Corner Stretch 20" x 5  20" x 5      x 5   Supine Cane TE - flex/abd/ER Flex/Abd/ER  1 x 20 each  3# Flex/Abd/ER  1 x 10 each  3# Flex/abd/ER  3# 1 x 10 ea  Flex/Abd/ER  3# 1 x 15 ea   Flex/Abd/ #3 x 20   S/L ER and Abd 1# 1 x 10 each  1# 1 x 10 ea   1 x 15  1 x 15  #1 x 10 ea   Prone - flex/ext/rows      Declined    NV      Prone - T & V's             Ball Roll on Wall-up/down/L/R/cw/ccw 1 x 10 each  1 x 10 ea  Declined NV  1 x 10 ea 1 x 10ea         Modalities 2/4/20 2-6--20 1-20-20 1-24-20 1/28/20   Estim/IFC with HP/CP IFC/HP  15 minutes IFC/HP   15' IFC/HP   15' IFC/HP   10 minutes HP 10 minutes    Ultrasound - R trap/shoulder D/C DC DC D/C D/C

## 2020-02-10 ENCOUNTER — TRANSCRIBE ORDERS (OUTPATIENT)
Dept: PHYSICAL THERAPY | Facility: HOME HEALTHCARE | Age: 60
End: 2020-02-10

## 2020-02-10 ENCOUNTER — EVALUATION (OUTPATIENT)
Dept: PHYSICAL THERAPY | Facility: HOME HEALTHCARE | Age: 60
End: 2020-02-10
Payer: OTHER GOVERNMENT

## 2020-02-10 DIAGNOSIS — M25.511 ARTHRALGIA OF RIGHT SHOULDER REGION: Primary | ICD-10-CM

## 2020-02-10 PROCEDURE — 97164 PT RE-EVAL EST PLAN CARE: CPT | Performed by: PHYSICAL THERAPIST

## 2020-02-10 PROCEDURE — 97110 THERAPEUTIC EXERCISES: CPT | Performed by: PHYSICAL THERAPIST

## 2020-02-10 PROCEDURE — 97014 ELECTRIC STIMULATION THERAPY: CPT | Performed by: PHYSICAL THERAPIST

## 2020-02-10 PROCEDURE — 97140 MANUAL THERAPY 1/> REGIONS: CPT | Performed by: PHYSICAL THERAPIST

## 2020-02-10 NOTE — LETTER
2020    [address] Janette Cogan, MD  [address] [place] [address]    Patient: Wally Flannery   YOB: 1960   Date of Visit: 2/10/2020     Encounter Diagnosis     ICD-10-CM    1  Arthralgia of right shoulder region M25 511        Dear Dr Janette Cogan: Thank you for your recent referral of Wally Flannery  Please review the attached evaluation summary from Aguila's recent visit  Please verify that you agree with the plan of care by signing the attached order  If you have any questions or concerns, please do not hesitate to call  I sincerely appreciate the opportunity to share in the care of one of your patients and hope to have another opportunity to work with you in the near future  Sincerely,    Emmanuel Ni, PT      Referring Provider:      I certify that I have read the below Plan of Care and certify the need for these services furnished under this plan of treatment while under my care  [address] MD Lex  [address] [place] [address] Street: [phone]          PT Re-Evaluation     Today's date: 02/10/2020  Patient name: Wally Flannery  : 1960  MRN: [id number]  Referring provider: Calista Victor MD  Dx:   Encounter Diagnosis     ICD-10-CM    1  Arthralgia of right shoulder region M25 511                   Assessment  Assessment details: The patient has been seen in PT for a total of 28 visits since [facility]  He continues to have complaints of pain in his R shoulder  Slight improvements have been made since his last re-eval  He continues to demonstrate deficits with decreased A/PROM and strength, decreased postural awareness, decreased flexibility, difficulty sleeping and pain with completing his ADLs and tasks at home  He still has most pain with reaching and lifting, reaching forward/going forward with his arm and when reaching behind his back    He still has increased pain with any OH activity or when reaching behind his back or out to the side  He still has difficulty with sleeping and when laying in a certain position his shoulder pain can wake him up  He should be going back to the doctor the end of the month and will be getting a TENs unit  Plan to continue with PT for two more visits (he has these approved from the [place]) and then he will be discharged to his HEP  Will finalize HEP over the next few visits  The patient would benefit from continued PT to address deficits and improve function  Tx to include ROM, stretching, strengthening, modalities, HEP, pt education, postural ed, lifting/body mechanics, neuro re-ed, balance/proprioception Te, MT and equipment  Impairments: abnormal or restricted ROM, activity intolerance, impaired physical strength, lacks appropriate home exercise program, pain with function, weight-bearing intolerance, poor posture  and poor body mechanics  Other impairment: decreased flexibility  Functional limitations: difficulty sleeping Understanding of Dx/Px/POC: good   Prognosis: fair    Goals  STGs:  1  Initiate and complete HEP with verbal cues  - met  2  Decrease R shoulder pain by > 25% in 4 weeks  - partially met  3  Improve shoulder strength by 1/2-1 grade in 4 weeks  - partially met  4  Improve shoulder ROM by 15-20 degrees in 4 weeks  - partially met  LTGs:  1  Patient to be I with HEP in 6 weeks  2   Improve R shoulder ROM to WNL t/o in 8 weeks to improve function  3   Improve RUE strength to > or = to 4/5 t/o in 8 weeks to improve function  4   Decrease R shoulder pain to < or = to 3-4/10 with activity in 8 weeks to improve function  5   Recreational performance in related activities is improved to PLOF in 8 weeks  6   Postural control is improved to maximal level of function in 8 weeks  7   ADL performance is improved to PLOF in 8 weeks  8   Patient to report improved sleep in 8 weeks        Plan  Plan details: Modalities and therapeutic intervention prn     Patient would benefit from: skilled physical therapy  Planned modality interventions: cryotherapy, thermotherapy: hydrocollator packs, unattended electrical stimulation and ultrasound  Planned therapy interventions: manual therapy, body mechanics training, neuromuscular re-education, patient education, postural training, self care, strengthening, stretching, therapeutic activities, therapeutic exercise, flexibility and home exercise program  Frequency: 2x week  Duration in weeks: 2  Plan of Care beginning date: 2/10/2020  Plan of Care expiration date: 3/27/2020  Treatment plan discussed with: patient        Subjective Evaluation    History of Present Illness  Mechanism of injury: The patient states that he will be going back to see the doctor the end of the month to see the doctor and get his TENS unit  He still has pain in his shoulder, mostly with reaching forward  Quality of life: fair    Pain  At best pain ratin  At worst pain ratin  Location: R Shoulder - anterior shoulder   Quality: dull ache, sharp and discomfort  Relieving factors: rest  Aggravating factors: lifting    Social Support  Lives with: alone    Employment status: not working  Hand dominance: right      Diagnostic Tests  X-ray: abnormal  Patient Goals  Patient goals for therapy: decreased pain, increased motion and increased strength  Patient goal: "To be able to get more movement in the arm, to stop the pain in the arm "         Objective     Static Posture     Head  Forward  Shoulders  Rounded  Postural Observations  Seated posture: fair  Standing posture: fair  Correction of posture: has no consistent effect        Palpation   Left   No palpable tenderness to the levator scapulae, middle trapezius, rhomboids and upper trapezius  Right   No palpable tenderness to the levator scapulae, middle trapezius and rhomboids  Tenderness of the upper trapezius       Neurological Testing     Sensation     Shoulder   Left Shoulder   Intact: light touch    Right Shoulder   Intact: light touch    Active Range of Motion   Left Shoulder   Flexion: 170 degrees   Abduction: 165 degrees   External rotation 90°:  80 degrees   Internal rotation 90°:  60 degrees     Right Shoulder   Flexion: 144 degrees with pain  Abduction: 130 degrees with pain  External rotation 90°:  54 degreeswith pain  Internal rotation 90°:  42 degrees with pain    Left Elbow   Normal active range of motion    Right Elbow   Normal active range of motion    Passive Range of Motion     Right Shoulder   Flexion: 145 degrees with pain  Abduction: 130 degrees with pain  External rotation 90°:  55 degrees with pain    Strength/Myotome Testing     Right Shoulder     Planes of Motion   Flexion: 4-   Abduction: 3+   External rotation at 90°:  3+   Internal rotation at 90°:  4-     Left Elbow   Normal strength    Right Elbow   Normal strength    Tests     Right Shoulder   Positive empty can and painful arc  Negative drop arm       Ambulation     Ambulation: Level Surfaces   Ambulation without assistive device: independent         Precautions: None  Re-Eval DUE: 3/10/20  Specialty Daily Treatment Diary      Manual  2/4/20 2-6-20 2/10/20 1-24-20 1/28/20   PROM - all planes - R shdr 10 minutes 10' 10 minutes 10' 10 minutes  Joint mobs         Exercise Diary  2/4/20 2-6-20 2/10/20 1-24-20 1/28/20   UBE Alt 10 minutes Alt 10' Alt 10 minutes Alt 10' Alt 10 minutes   Pulleys - flex/abd 1 x 30 each 1 x 30 each 1 x 30 each 1 x 30 each 1 x 30 each   MTP/LTP Black 1 x 25 each Black 1 x 25 ea Black  1 x 25 ea Black 2 x 10 ea  Black x 25 ea   Tband - ER/IR Black x 25 each Black 1 x 25 ea Black  1 x 25 ea Black 2 x 10 ea Black x 25   Tband punch Black 1 x 25  Black 1 x 15 Black 1 x 25 Black 1 x 20 Black x 25   Shrugs/Rolls/ Retractions 1 x 30 each  4# 4#  1 x 20 each 4# 1 x 20 each 3# 1 x 20 ea #4 x 30   Wall Pushups 1 x 20 1 x 20 1 x 20 1 x 15 1 x 20   Wall Slides - flex/abd 1 x 15 each 1 x 15 each 1 x 15 each  1 x 15 ea 1 x 15 each   Isometrics - all planes 5" x 5 each 5" x 5 each 5" x 10 each 5" 1 x 15 ea  5" x 5 ea   Stand - FF and Abd 1 x 15 each  1# 1x 15 ea   1# x 15 ea  x 15 ea 15 ea   Table Slides - flex/abd             Corner Stretch 20" x 5  20" x 5 20" x 5     x 5   Supine Cane TE - flex/abd/ER Flex/Abd/ER  1 x 20 each  3# Flex/Abd/ER  1 x 10 each  3# Flex/abd/ER  3# 1 x 10 ea  Flex/Abd/ER  3# 1 x 15 ea   Flex/Abd/ #3 x 20   S/L ER and Abd 1# 1 x 10 each  1# 1 x 10 ea  1# 1 x 10 each  1 x 15  #1 x 10 ea   Prone - flex/ext/rows            Prone - T & V's             Ball Roll on Wall-up/down/L/R/cw/ccw 1 x 10 each  1 x 10 ea 1 x 10 each  1 x 10 ea 1 x 10ea         Modalities 2/4/20 2-6--20 2/10/20 1-24-20 1/28/20   Estim/IFC with HP/CP IFC/HP  15 minutes IFC/HP   15' IFC/HP   15 minutes IFC/HP   10 minutes HP 10 minutes    Ultrasound - R trap/shoulder D/C DC DC D/C D/C

## 2020-02-10 NOTE — PROGRESS NOTES
PT Re-Evaluation     Today's date: 02/10/2020  Patient name: Tana Rojas  : 1960  MRN: [id number]  Referring provider: Elidia Elizondo MD  Dx:   Encounter Diagnosis     ICD-10-CM    1  Arthralgia of right shoulder region M25 511                   Assessment  Assessment details: The patient has been seen in PT for a total of 28 visits since [facility]  He continues to have complaints of pain in his R shoulder  Slight improvements have been made since his last re-eval  He continues to demonstrate deficits with decreased A/PROM and strength, decreased postural awareness, decreased flexibility, difficulty sleeping and pain with completing his ADLs and tasks at home  He still has most pain with reaching and lifting, reaching forward/going forward with his arm and when reaching behind his back  He still has increased pain with any OH activity or when reaching behind his back or out to the side  He still has difficulty with sleeping and when laying in a certain position his shoulder pain can wake him up  He should be going back to the doctor the end of the month and will be getting a TENs unit  Plan to continue with PT for two more visits (he has these approved from the [place]) and then he will be discharged to his HEP  Will finalize HEP over the next few visits  The patient would benefit from continued PT to address deficits and improve function  Tx to include ROM, stretching, strengthening, modalities, HEP, pt education, postural ed, lifting/body mechanics, neuro re-ed, balance/proprioception Te, MT and equipment  Impairments: abnormal or restricted ROM, activity intolerance, impaired physical strength, lacks appropriate home exercise program, pain with function, weight-bearing intolerance, poor posture  and poor body mechanics  Other impairment: decreased flexibility  Functional limitations: difficulty sleeping Understanding of Dx/Px/POC: good   Prognosis: fair    Goals  STGs:  1    Initiate and complete HEP with verbal cues  - met  2  Decrease R shoulder pain by > 25% in 4 weeks  - partially met  3  Improve shoulder strength by 1/2-1 grade in 4 weeks  - partially met  4  Improve shoulder ROM by 15-20 degrees in 4 weeks  - partially met  LTGs:  1  Patient to be I with HEP in 6 weeks  2   Improve R shoulder ROM to WNL t/o in 8 weeks to improve function  3   Improve RUE strength to > or = to 4/5 t/o in 8 weeks to improve function  4   Decrease R shoulder pain to < or = to 3-4/10 with activity in 8 weeks to improve function  5   Recreational performance in related activities is improved to PLOF in 8 weeks  6   Postural control is improved to maximal level of function in 8 weeks  7   ADL performance is improved to PLOF in 8 weeks  8   Patient to report improved sleep in 8 weeks  Plan  Plan details: Modalities and therapeutic intervention prn  Patient would benefit from: skilled physical therapy  Planned modality interventions: cryotherapy, thermotherapy: hydrocollator packs, unattended electrical stimulation and ultrasound  Planned therapy interventions: manual therapy, body mechanics training, neuromuscular re-education, patient education, postural training, self care, strengthening, stretching, therapeutic activities, therapeutic exercise, flexibility and home exercise program  Frequency: 2x week  Duration in weeks: 2  Plan of Care beginning date: 2/10/2020  Plan of Care expiration date: 3/27/2020  Treatment plan discussed with: patient        Subjective Evaluation    History of Present Illness  Mechanism of injury: The patient states that he will be going back to see the doctor the end of the month to see the doctor and get his TENS unit  He still has pain in his shoulder, mostly with reaching forward          Quality of life: fair    Pain  At best pain ratin  At worst pain ratin  Location: R Shoulder - anterior shoulder   Quality: dull ache, sharp and discomfort  Relieving factors: rest  Aggravating factors: lifting    Social Support  Lives with: alone    Employment status: not working  Hand dominance: right      Diagnostic Tests  X-ray: abnormal  Patient Goals  Patient goals for therapy: decreased pain, increased motion and increased strength  Patient goal: "To be able to get more movement in the arm, to stop the pain in the arm "         Objective     Static Posture     Head  Forward  Shoulders  Rounded  Postural Observations  Seated posture: fair  Standing posture: fair  Correction of posture: has no consistent effect        Palpation   Left   No palpable tenderness to the levator scapulae, middle trapezius, rhomboids and upper trapezius  Right   No palpable tenderness to the levator scapulae, middle trapezius and rhomboids  Tenderness of the upper trapezius  Neurological Testing     Sensation     Shoulder   Left Shoulder   Intact: light touch    Right Shoulder   Intact: light touch    Active Range of Motion   Left Shoulder   Flexion: 170 degrees   Abduction: 165 degrees   External rotation 90°: 80 degrees   Internal rotation 90°: 60 degrees     Right Shoulder   Flexion: 144 degrees with pain  Abduction: 130 degrees with pain  External rotation 90°: 54 degreeswith pain  Internal rotation 90°: 42 degrees with pain    Left Elbow   Normal active range of motion    Right Elbow   Normal active range of motion    Passive Range of Motion     Right Shoulder   Flexion: 145 degrees with pain  Abduction: 130 degrees with pain  External rotation 90°: 55 degrees with pain    Strength/Myotome Testing     Right Shoulder     Planes of Motion   Flexion: 4-   Abduction: 3+   External rotation at 90°: 3+   Internal rotation at 90°: 4-     Left Elbow   Normal strength    Right Elbow   Normal strength    Tests     Right Shoulder   Positive empty can and painful arc  Negative drop arm       Ambulation     Ambulation: Level Surfaces   Ambulation without assistive device: independent         Precautions: None  Re-Eval DUE: 3/10/20  Specialty Daily Treatment Diary      Manual  2/4/20 2-6-20 2/10/20 1-24-20 1/28/20   PROM - all planes - R shdr 10 minutes 10' 10 minutes 10' 10 minutes  Joint mobs         Exercise Diary  2/4/20 2-6-20 2/10/20 1-24-20 1/28/20   UBE Alt 10 minutes Alt 10' Alt 10 minutes Alt 10' Alt 10 minutes   Pulleys - flex/abd 1 x 30 each 1 x 30 each 1 x 30 each 1 x 30 each 1 x 30 each   MTP/LTP Black 1 x 25 each Black 1 x 25 ea Black  1 x 25 ea Black 2 x 10 ea  Black x 25 ea   Tband - ER/IR Black x 25 each Black 1 x 25 ea Black  1 x 25 ea Black 2 x 10 ea Black x 25   Tband punch Black 1 x 25  Black 1 x 15 Black 1 x 25 Black 1 x 20 Black x 25   Shrugs/Rolls/ Retractions 1 x 30 each  4# 4#  1 x 20 each 4# 1 x 20 each 3# 1 x 20 ea #4 x 30   Wall Pushups 1 x 20 1 x 20 1 x 20 1 x 15 1 x 20   Wall Slides - flex/abd 1 x 15 each 1 x 15 each 1 x 15 each  1 x 15 ea 1 x 15 each   Isometrics - all planes 5" x 5 each 5" x 5 each 5" x 10 each 5" 1 x 15 ea  5" x 5 ea   Stand - FF and Abd 1 x 15 each  1# 1x 15 ea   1# x 15 ea  x 15 ea 15 ea   Table Slides - flex/abd             Corner Stretch 20" x 5  20" x 5 20" x 5     x 5   Supine Cane TE - flex/abd/ER Flex/Abd/ER  1 x 20 each  3# Flex/Abd/ER  1 x 10 each  3# Flex/abd/ER  3# 1 x 10 ea  Flex/Abd/ER  3# 1 x 15 ea   Flex/Abd/ #3 x 20   S/L ER and Abd 1# 1 x 10 each  1# 1 x 10 ea  1# 1 x 10 each  1 x 15  #1 x 10 ea   Prone - flex/ext/rows            Prone - T & V's             Ball Roll on Wall-up/down/L/R/cw/ccw 1 x 10 each  1 x 10 ea 1 x 10 each  1 x 10 ea 1 x 10ea         Modalities 2/4/20 2-6--20 2/10/20 1-24-20 1/28/20   Estim/IFC with HP/CP IFC/HP  15 minutes IFC/HP   15' IFC/HP   15 minutes IFC/HP   10 minutes HP 10 minutes    Ultrasound - R trap/shoulder D/C DC DC D/C D/C

## 2020-02-17 ENCOUNTER — OFFICE VISIT (OUTPATIENT)
Dept: PHYSICAL THERAPY | Facility: HOME HEALTHCARE | Age: 60
End: 2020-02-17
Payer: OTHER GOVERNMENT

## 2020-02-17 DIAGNOSIS — M25.511 ARTHRALGIA OF RIGHT SHOULDER REGION: Primary | ICD-10-CM

## 2020-02-17 PROCEDURE — 97110 THERAPEUTIC EXERCISES: CPT

## 2020-02-17 PROCEDURE — 97014 ELECTRIC STIMULATION THERAPY: CPT

## 2020-02-17 PROCEDURE — 97140 MANUAL THERAPY 1/> REGIONS: CPT

## 2020-02-17 NOTE — PROGRESS NOTES
Daily Note     Today's date: 2020  Patient name: Naman Jaffe  : 1960  MRN: [id number]  Referring provider: Ruth Trammell MD  Dx:   Encounter Diagnosis     ICD-10-CM    1  Arthralgia of right shoulder region M25 511                   Subjective: Pain level 6-7/10  My arm was twitching yesterday       Objective: See treatment diary below      Assessment: Tolerated treatment fair and still painful with stretching with limitation in end ranges of all active and passsive shoulder ranges  Patient possible discharge to home exercise program after next visit      Plan: Continue per plan of care  Progress treatment as tolerated  Stress home exercise program     Precautions: None  Re-Eval DUE: 3/10/20  Specialty Daily Treatment Diary      Manual  2/4/20 2-6-20 2/10/20 2/17/20 1/28/20   PROM - all planes - R shdr 10 minutes 10' 10 minutes 10' 10 minutes  Joint mobs         Exercise Diary  2/4/20 2-6-20 2/10/20 2/17/20 1/28/20   UBE Alt 10 minutes Alt 10' Alt 10 minutes Alt 10' Alt 10 minutes   Pulleys - flex/abd 1 x 30 each 1 x 30 each 1 x 30 each 1 x 30 each 1 x 30 each   MTP/LTP Black 1 x 25 each Black 1 x 25 ea Black  1 x 25 ea Black x 25 ea  Black x 25 ea   Tband - ER/IR Black x 25 each Black 1 x 25 ea Black  1 x 25 ea Black 25 ea Black x 25   Tband punch Black 1 x 25  Black 1 x 15 Black 1 x 25 Black 1 x 25 Black x 25   Shrugs/Rolls/ Retractions 1 x 30 each  4# 4#  1 x 20 each 4# 1 x 20 each 5# 1 x 20 ea #4 x 30   Wall Pushups 1 x 20 1 x 20 1 x 20 1x 25 1 x 20   Wall Slides - flex/abd 1 x 15 each 1 x 15 each 1 x 15 each  1 x 15 ea 1 x 15 each   Isometrics - all planes 5" x 5 each 5" x 5 each 5" x 10 each 5" 1 x 15 ea  5" x 5 ea   Stand - FF and Abd 1 x 15 each  1# 1x 15 ea   1# x 15 ea  #2 x 15 ea 15 ea   Table Slides - flex/abd             Corner Stretch 20" x 5  20" x 5 20" x 5     x 5   Supine Cane TE - flex/abd/ER Flex/Abd/ER  1 x 20 each  3# Flex/Abd/ER  1 x 10 each  3# Flex/abd/ER  3# 1 x 10 ea Flex/Abd/ER  3# 1 x 15 ea   Flex/Abd/ #3 x 20   S/L ER and Abd 1# 1 x 10 each  1# 1 x 10 ea  1# 1 x 10 each  #2 x 30  #1 x 10 ea   Prone - flex/ext/rows            Prone - T & V's             Ball Roll on Wall-up/down/L/R/cw/ccw 1 x 10 each  1 x 10 ea 1 x 10 each  1 x 10 ea 1 x 10ea         Modalities 2/4/20 2-6--20 2/10/20 1-24-20 1/28/20   Estim/IFC with HP/CP IFC/HP  15 minutes IFC/HP   15' IFC/HP   15 minutes IFC/HP   10 minutes HP 10 minutes    Ultrasound - R trap/shoulder D/C DC DC D/C D/C

## 2020-02-20 ENCOUNTER — APPOINTMENT (OUTPATIENT)
Dept: PHYSICAL THERAPY | Facility: HOME HEALTHCARE | Age: 60
End: 2020-02-20
Payer: OTHER GOVERNMENT

## 2020-03-19 NOTE — PROGRESS NOTES
PT Discharge    Today's date: 2020  Patient name: Eve Merchant  : 1960  MRN: [id number]  Referring provider: Venecia Bowden MD  Dx:   Encounter Diagnosis     ICD-10-CM    1  Arthralgia of right shoulder region M25 [id number]        Start Time: 954  Stop Time:   Total time in clinic (min): 68 minutes    Assessment  Assessment details: The patient had his PT IE on 19 and he was seen in PT for a total of 29 visits with his last treatment on 20  He did not show for his next appointment and did not return for more treatment  Unable to assess his current status, refer to his last PT re-eval for his final assessment  Progress towards his goals as outlined in his last re-eval   Unable to keep patient on hold, D/C PT secondary to script   D/C PT  Impairments: abnormal or restricted ROM, activity intolerance, impaired physical strength, lacks appropriate home exercise program, pain with function, weight-bearing intolerance, poor posture  and poor body mechanics  Other impairment: decreased flexibility  Functional limitations: difficulty sleeping Understanding of Dx/Px/POC: good   Prognosis: fair    Goals  STGs:  1  Initiate and complete HEP with verbal cues  - met  2  Decrease R shoulder pain by > 25% in 4 weeks  - partially met  3  Improve shoulder strength by 1/2-1 grade in 4 weeks  - partially met  4  Improve shoulder ROM by 15-20 degrees in 4 weeks  - partially met  LTGs:  1  Patient to be I with HEP in 6 weeks  - met  2  Improve R shoulder ROM to WNL t/o in 8 weeks to improve function  - partially met  3  Improve RUE strength to > or = to 4/5 t/o in 8 weeks to improve function  - partially met  4  Decrease R shoulder pain to < or = to 3-4/10 with activity in 8 weeks to improve function  - not met    5  Recreational performance in related activities is improved to PLOF in 8 weeks  - partially met  6    Postural control is improved to maximal level of function in 8 weeks  - met  7  ADL performance is improved to PLOF in 8 weeks  - met  8  Patient to report improved sleep in 8 weeks  - partially met    Plan  Plan details: Unable to keep patient on hold, D/C PT secondary to script   D/C PT  Planned modality interventions: cryotherapy, thermotherapy: hydrocollator packs, unattended electrical stimulation and ultrasound  Planned therapy interventions: manual therapy, body mechanics training, neuromuscular re-education, patient education, postural training, self care, strengthening, stretching, therapeutic activities, therapeutic exercise, flexibility and home exercise program        Subjective Evaluation    History of Present Illness  Mechanism of injury: The patient states that he will be going back to see the doctor the end of the month to see the doctor and get his TENS unit  He still has pain in his shoulder, mostly with reaching forward  Quality of life: fair    Pain  At best pain ratin  At worst pain ratin  Location: R Shoulder - anterior shoulder   Quality: dull ache, sharp and discomfort  Relieving factors: rest  Aggravating factors: lifting    Social Support  Lives with: alone    Employment status: not working  Hand dominance: right      Diagnostic Tests  X-ray: abnormal  Patient Goals  Patient goals for therapy: decreased pain, increased motion and increased strength  Patient goal: "To be able to get more movement in the arm, to stop the pain in the arm "         Objective     Static Posture     Head  Forward  Shoulders  Rounded  Postural Observations  Seated posture: fair  Standing posture: fair  Correction of posture: has no consistent effect        Palpation   Left   No palpable tenderness to the levator scapulae, middle trapezius, rhomboids and upper trapezius  Right   No palpable tenderness to the levator scapulae, middle trapezius and rhomboids  Tenderness of the upper trapezius       Neurological Testing     Sensation Shoulder   Left Shoulder   Intact: light touch    Right Shoulder   Intact: light touch    Active Range of Motion   Left Shoulder   Flexion: 170 degrees   Abduction: 165 degrees   External rotation 90°: 80 degrees   Internal rotation 90°: 60 degrees     Right Shoulder   Flexion: 144 degrees with pain  Abduction: 130 degrees with pain  External rotation 90°: 54 degreeswith pain  Internal rotation 90°: 42 degrees with pain    Left Elbow   Normal active range of motion    Right Elbow   Normal active range of motion    Passive Range of Motion     Right Shoulder   Flexion: 145 degrees with pain  Abduction: 130 degrees with pain  External rotation 90°: 55 degrees with pain    Strength/Myotome Testing     Right Shoulder     Planes of Motion   Flexion: 4-   Abduction: 3+   External rotation at 90°: 3+   Internal rotation at 90°: 4-     Left Elbow   Normal strength    Right Elbow   Normal strength    Tests     Right Shoulder   Positive empty can and painful arc  Negative drop arm       Ambulation     Ambulation: Level Surfaces   Ambulation without assistive device: independent

## 2021-01-01 ENCOUNTER — APPOINTMENT (INPATIENT)
Dept: RADIOLOGY | Facility: HOSPITAL | Age: 61
DRG: 871 | End: 2021-01-01
Payer: COMMERCIAL

## 2021-01-01 ENCOUNTER — HOSPITAL ENCOUNTER (INPATIENT)
Facility: HOSPITAL | Age: 61
LOS: 6 days | Discharge: HOME HEALTH | DRG: 871 | End: 2021-06-23
Attending: INTERNAL MEDICINE | Admitting: INTERNAL MEDICINE
Payer: COMMERCIAL

## 2021-01-01 ENCOUNTER — APPOINTMENT (EMERGENCY)
Dept: RADIOLOGY | Facility: HOSPITAL | Age: 61
DRG: 871 | End: 2021-01-01
Payer: COMMERCIAL

## 2021-01-01 ENCOUNTER — APPOINTMENT (EMERGENCY)
Dept: CT IMAGING | Facility: HOSPITAL | Age: 61
DRG: 871 | End: 2021-01-01
Payer: COMMERCIAL

## 2021-01-01 ENCOUNTER — TELEPHONE (OUTPATIENT)
Dept: FAMILY MEDICINE CLINIC | Facility: CLINIC | Age: 61
End: 2021-01-01

## 2021-01-01 ENCOUNTER — HOSPITAL ENCOUNTER (INPATIENT)
Facility: HOSPITAL | Age: 61
LOS: 1 days | DRG: 871 | End: 2021-06-17
Attending: EMERGENCY MEDICINE | Admitting: INTERNAL MEDICINE
Payer: COMMERCIAL

## 2021-01-01 ENCOUNTER — IMMUNIZATIONS (OUTPATIENT)
Dept: FAMILY MEDICINE CLINIC | Facility: HOSPITAL | Age: 61
End: 2021-01-01

## 2021-01-01 ENCOUNTER — OFFICE VISIT (OUTPATIENT)
Dept: FAMILY MEDICINE CLINIC | Facility: CLINIC | Age: 61
End: 2021-01-01
Payer: COMMERCIAL

## 2021-01-01 ENCOUNTER — APPOINTMENT (INPATIENT)
Dept: NON INVASIVE DIAGNOSTICS | Facility: HOSPITAL | Age: 61
DRG: 871 | End: 2021-01-01
Payer: COMMERCIAL

## 2021-01-01 ENCOUNTER — VBI (OUTPATIENT)
Dept: ADMINISTRATIVE | Facility: OTHER | Age: 61
End: 2021-01-01

## 2021-01-01 ENCOUNTER — RA CDI HCC (OUTPATIENT)
Dept: OTHER | Facility: HOSPITAL | Age: 61
End: 2021-01-01

## 2021-01-01 VITALS
RESPIRATION RATE: 29 BRPM | TEMPERATURE: 97.3 F | DIASTOLIC BLOOD PRESSURE: 58 MMHG | WEIGHT: 154.54 LBS | SYSTOLIC BLOOD PRESSURE: 128 MMHG | HEART RATE: 138 BPM | BODY MASS INDEX: 24.84 KG/M2 | OXYGEN SATURATION: 94 % | HEIGHT: 66 IN

## 2021-01-01 VITALS
RESPIRATION RATE: 18 BRPM | SYSTOLIC BLOOD PRESSURE: 150 MMHG | BODY MASS INDEX: 29.57 KG/M2 | WEIGHT: 184 LBS | HEIGHT: 66 IN | TEMPERATURE: 98.8 F | HEART RATE: 89 BPM | OXYGEN SATURATION: 97 % | DIASTOLIC BLOOD PRESSURE: 80 MMHG

## 2021-01-01 VITALS
SYSTOLIC BLOOD PRESSURE: 134 MMHG | WEIGHT: 164 LBS | TEMPERATURE: 97.4 F | RESPIRATION RATE: 20 BRPM | HEART RATE: 84 BPM | BODY MASS INDEX: 26.36 KG/M2 | DIASTOLIC BLOOD PRESSURE: 84 MMHG | HEIGHT: 66 IN

## 2021-01-01 VITALS
HEIGHT: 66 IN | SYSTOLIC BLOOD PRESSURE: 124 MMHG | HEART RATE: 84 BPM | TEMPERATURE: 96.2 F | WEIGHT: 171 LBS | RESPIRATION RATE: 20 BRPM | DIASTOLIC BLOOD PRESSURE: 68 MMHG | BODY MASS INDEX: 27.48 KG/M2

## 2021-01-01 DIAGNOSIS — E87.2 LACTIC ACIDOSIS: ICD-10-CM

## 2021-01-01 DIAGNOSIS — Z79.4 TYPE 2 DIABETES MELLITUS WITH HYPERGLYCEMIA, WITH LONG-TERM CURRENT USE OF INSULIN (HCC): ICD-10-CM

## 2021-01-01 DIAGNOSIS — E11.65 TYPE 2 DIABETES MELLITUS WITH HYPERGLYCEMIA, WITHOUT LONG-TERM CURRENT USE OF INSULIN (HCC): ICD-10-CM

## 2021-01-01 DIAGNOSIS — M25.511 CHRONIC RIGHT SHOULDER PAIN: ICD-10-CM

## 2021-01-01 DIAGNOSIS — A41.9 SEVERE SEPSIS WITH SEPTIC SHOCK (HCC): ICD-10-CM

## 2021-01-01 DIAGNOSIS — F32.2 MAJOR DEPRESSIVE DISORDER, SINGLE EPISODE, SEVERE WITHOUT PSYCHOTIC FEATURES (HCC): ICD-10-CM

## 2021-01-01 DIAGNOSIS — G89.29 CHRONIC RIGHT SHOULDER PAIN: ICD-10-CM
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
## 2021-01-01 DIAGNOSIS — N17.9 ACUTE KIDNEY INJURY (HCC): Primary | ICD-10-CM

## 2021-01-01 DIAGNOSIS — N30.90 CYSTITIS: ICD-10-CM

## 2021-01-01 DIAGNOSIS — I10 ESSENTIAL HYPERTENSION: ICD-10-CM

## 2021-01-01 DIAGNOSIS — Z23 ENCOUNTER FOR IMMUNIZATION: Primary | ICD-10-CM

## 2021-01-01 DIAGNOSIS — M25.561 CHRONIC PAIN OF RIGHT KNEE: ICD-10-CM

## 2021-01-01 DIAGNOSIS — J01.00 SUBACUTE MAXILLARY SINUSITIS: Primary | ICD-10-CM

## 2021-01-01 DIAGNOSIS — Z86.19 HISTORY OF SEPTIC SHOCK: ICD-10-CM

## 2021-01-01 DIAGNOSIS — R65.21 SEVERE SEPSIS WITH SEPTIC SHOCK (HCC): ICD-10-CM

## 2021-01-01 DIAGNOSIS — G89.29 CHRONIC PAIN OF RIGHT KNEE: ICD-10-CM

## 2021-01-01 DIAGNOSIS — E11.65 TYPE 2 DIABETES MELLITUS WITH HYPERGLYCEMIA, WITH LONG-TERM CURRENT USE OF INSULIN (HCC): ICD-10-CM

## 2021-01-01 DIAGNOSIS — E55.9 VITAMIN D DEFICIENCY: ICD-10-CM

## 2021-01-01 DIAGNOSIS — I95.9 HYPOTENSION: ICD-10-CM

## 2021-01-01 DIAGNOSIS — K52.9 COLITIS: ICD-10-CM

## 2021-01-01 DIAGNOSIS — Z00.00 MEDICARE ANNUAL WELLNESS VISIT, SUBSEQUENT: ICD-10-CM

## 2021-01-01 DIAGNOSIS — K52.9 COLITIS: Primary | ICD-10-CM

## 2021-01-01 DIAGNOSIS — R73.9 HYPERGLYCEMIA: ICD-10-CM

## 2021-01-01 DIAGNOSIS — A09 COLITIS, INFECTIOUS: ICD-10-CM

## 2021-01-01 LAB
ALBUMIN SERPL BCP-MCNC: 1.7 G/DL (ref 3.5–5)
ALBUMIN SERPL BCP-MCNC: 2.2 G/DL (ref 3.5–5)
ALBUMIN SERPL BCP-MCNC: 2.4 G/DL (ref 3.5–5)
ALBUMIN SERPL BCP-MCNC: 3 G/DL (ref 3.5–5)
ALP SERPL-CCNC: 101 U/L (ref 46–116)
ALP SERPL-CCNC: 54 U/L (ref 46–116)
ALP SERPL-CCNC: 68 U/L (ref 46–116)
ALP SERPL-CCNC: 68 U/L (ref 46–116)
ALT SERPL W P-5'-P-CCNC: 186 U/L (ref 12–78)
ALT SERPL W P-5'-P-CCNC: 26 U/L (ref 12–78)
ALT SERPL W P-5'-P-CCNC: 57 U/L (ref 12–78)
ALT SERPL W P-5'-P-CCNC: 98 U/L (ref 12–78)
ANION GAP SERPL CALCULATED.3IONS-SCNC: 10 MMOL/L (ref 4–13)
ANION GAP SERPL CALCULATED.3IONS-SCNC: 10 MMOL/L (ref 4–13)
ANION GAP SERPL CALCULATED.3IONS-SCNC: 11 MMOL/L (ref 4–13)
ANION GAP SERPL CALCULATED.3IONS-SCNC: 12 MMOL/L (ref 4–13)
ANION GAP SERPL CALCULATED.3IONS-SCNC: 13 MMOL/L (ref 4–13)
ANION GAP SERPL CALCULATED.3IONS-SCNC: 14 MMOL/L (ref 4–13)
ANION GAP SERPL CALCULATED.3IONS-SCNC: 7 MMOL/L (ref 4–13)
ANION GAP SERPL CALCULATED.3IONS-SCNC: 9 MMOL/L (ref 4–13)
APAP SERPL-MCNC: <2 UG/ML (ref 10–20)
ARTERIAL PATENCY WRIST A: YES
AST SERPL W P-5'-P-CCNC: 154 U/L (ref 5–45)
AST SERPL W P-5'-P-CCNC: 26 U/L (ref 5–45)
AST SERPL W P-5'-P-CCNC: 58 U/L (ref 5–45)
AST SERPL W P-5'-P-CCNC: 67 U/L (ref 5–45)
ATRIAL RATE: 101 BPM
BACTERIA BLD CULT: NORMAL
BACTERIA UR CULT: NORMAL
BACTERIA UR QL AUTO: ABNORMAL /HPF
BACTERIA UR QL AUTO: NORMAL /HPF
BASE EX.OXY STD BLDV CALC-SCNC: 61 % (ref 60–80)
BASE EXCESS BLDA CALC-SCNC: -3.6 MMOL/L
BASE EXCESS BLDV CALC-SCNC: -5.8 MMOL/L
BASOPHILS # BLD MANUAL: 0 THOUSAND/UL (ref 0–0.1)
BASOPHILS NFR MAR MANUAL: 0 % (ref 0–1)
BETA-HYDROXYBUTYRATE: 0.4 MMOL/L
BILIRUB SERPL-MCNC: 0.81 MG/DL (ref 0.2–1)
BILIRUB SERPL-MCNC: 0.83 MG/DL (ref 0.2–1)
BILIRUB SERPL-MCNC: 0.86 MG/DL (ref 0.2–1)
BILIRUB SERPL-MCNC: 1.12 MG/DL (ref 0.2–1)
BILIRUB UR QL STRIP: NEGATIVE
BILIRUB UR QL STRIP: NEGATIVE
BUN SERPL-MCNC: 16 MG/DL (ref 5–25)
BUN SERPL-MCNC: 26 MG/DL (ref 5–25)
BUN SERPL-MCNC: 28 MG/DL (ref 5–25)
BUN SERPL-MCNC: 32 MG/DL (ref 5–25)
BUN SERPL-MCNC: 32 MG/DL (ref 5–25)
BUN SERPL-MCNC: 34 MG/DL (ref 5–25)
BUN SERPL-MCNC: 34 MG/DL (ref 5–25)
BUN SERPL-MCNC: 40 MG/DL (ref 5–25)
C DIFF TOX B TCDB STL QL NAA+PROBE: NEGATIVE
CA-I BLD-SCNC: 1.05 MMOL/L (ref 1.12–1.32)
CA-I BLD-SCNC: 1.06 MMOL/L (ref 1.12–1.32)
CALCIUM ALBUM COR SERPL-MCNC: 10.3 MG/DL (ref 8.3–10.1)
CALCIUM ALBUM COR SERPL-MCNC: 8.9 MG/DL (ref 8.3–10.1)
CALCIUM ALBUM COR SERPL-MCNC: 9.1 MG/DL (ref 8.3–10.1)
CALCIUM ALBUM COR SERPL-MCNC: 9.1 MG/DL (ref 8.3–10.1)
CALCIUM SERPL-MCNC: 7.1 MG/DL (ref 8.3–10.1)
CALCIUM SERPL-MCNC: 7.3 MG/DL (ref 8.3–10.1)
CALCIUM SERPL-MCNC: 7.6 MG/DL (ref 8.3–10.1)
CALCIUM SERPL-MCNC: 7.7 MG/DL (ref 8.3–10.1)
CALCIUM SERPL-MCNC: 7.7 MG/DL (ref 8.3–10.1)
CALCIUM SERPL-MCNC: 7.8 MG/DL (ref 8.3–10.1)
CALCIUM SERPL-MCNC: 8.5 MG/DL (ref 8.3–10.1)
CALCIUM SERPL-MCNC: 9.5 MG/DL (ref 8.3–10.1)
CAMPYLOBACTER DNA SPEC NAA+PROBE: NORMAL
CHLORIDE SERPL-SCNC: 102 MMOL/L (ref 100–108)
CHLORIDE SERPL-SCNC: 103 MMOL/L (ref 100–108)
CHLORIDE SERPL-SCNC: 104 MMOL/L (ref 100–108)
CHLORIDE SERPL-SCNC: 106 MMOL/L (ref 100–108)
CHLORIDE SERPL-SCNC: 106 MMOL/L (ref 100–108)
CHLORIDE SERPL-SCNC: 108 MMOL/L (ref 100–108)
CHLORIDE SERPL-SCNC: 109 MMOL/L (ref 100–108)
CHLORIDE SERPL-SCNC: 92 MMOL/L (ref 100–108)
CK SERPL-CCNC: 52 U/L (ref 39–308)
CK SERPL-CCNC: 53 U/L (ref 39–308)
CLARITY UR: CLEAR
CLARITY UR: CLEAR
CO2 SERPL-SCNC: 22 MMOL/L (ref 21–32)
CO2 SERPL-SCNC: 22 MMOL/L (ref 21–32)
CO2 SERPL-SCNC: 24 MMOL/L (ref 21–32)
CO2 SERPL-SCNC: 26 MMOL/L (ref 21–32)
CO2 SERPL-SCNC: 26 MMOL/L (ref 21–32)
CO2 SERPL-SCNC: 28 MMOL/L (ref 21–32)
COLOR UR: ABNORMAL
COLOR UR: YELLOW
CREAT SERPL-MCNC: 0.68 MG/DL (ref 0.6–1.3)
CREAT SERPL-MCNC: 0.81 MG/DL (ref 0.6–1.3)
CREAT SERPL-MCNC: 0.9 MG/DL (ref 0.6–1.3)
CREAT SERPL-MCNC: 1.19 MG/DL (ref 0.6–1.3)
CREAT SERPL-MCNC: 1.39 MG/DL (ref 0.6–1.3)
CREAT SERPL-MCNC: 2.06 MG/DL (ref 0.6–1.3)
CREAT SERPL-MCNC: 2.35 MG/DL (ref 0.6–1.3)
CREAT SERPL-MCNC: 3 MG/DL (ref 0.6–1.3)
EOSINOPHIL # BLD MANUAL: 0 THOUSAND/UL (ref 0–0.4)
EOSINOPHIL NFR BLD MANUAL: 0 % (ref 0–6)
ERYTHROCYTE [DISTWIDTH] IN BLOOD BY AUTOMATED COUNT: 13.3 % (ref 11.6–15.1)
ERYTHROCYTE [DISTWIDTH] IN BLOOD BY AUTOMATED COUNT: 13.5 % (ref 11.6–15.1)
ERYTHROCYTE [DISTWIDTH] IN BLOOD BY AUTOMATED COUNT: 13.7 % (ref 11.6–15.1)
ERYTHROCYTE [DISTWIDTH] IN BLOOD BY AUTOMATED COUNT: 14.1 % (ref 11.6–15.1)
ERYTHROCYTE [DISTWIDTH] IN BLOOD BY AUTOMATED COUNT: 14.3 % (ref 11.6–15.1)
ERYTHROCYTE [DISTWIDTH] IN BLOOD BY AUTOMATED COUNT: 14.5 % (ref 11.6–15.1)
EST. AVERAGE GLUCOSE BLD GHB EST-MCNC: 312 MG/DL
G LAMBLIA AG STL QL IA: NEGATIVE
GFR SERPL CREATININE-BSD FRML MDRD: 105 ML/MIN/1.73SQ M
GFR SERPL CREATININE-BSD FRML MDRD: 22 ML/MIN/1.73SQ M
GFR SERPL CREATININE-BSD FRML MDRD: 29 ML/MIN/1.73SQ M
GFR SERPL CREATININE-BSD FRML MDRD: 34 ML/MIN/1.73SQ M
GFR SERPL CREATININE-BSD FRML MDRD: 55 ML/MIN/1.73SQ M
GFR SERPL CREATININE-BSD FRML MDRD: 66 ML/MIN/1.73SQ M
GFR SERPL CREATININE-BSD FRML MDRD: 93 ML/MIN/1.73SQ M
GFR SERPL CREATININE-BSD FRML MDRD: 97 ML/MIN/1.73SQ M
GLUCOSE SERPL-MCNC: 101 MG/DL (ref 65–140)
GLUCOSE SERPL-MCNC: 105 MG/DL (ref 65–140)
GLUCOSE SERPL-MCNC: 110 MG/DL (ref 65–140)
GLUCOSE SERPL-MCNC: 112 MG/DL (ref 65–140)
GLUCOSE SERPL-MCNC: 115 MG/DL (ref 65–140)
GLUCOSE SERPL-MCNC: 116 MG/DL (ref 65–140)
GLUCOSE SERPL-MCNC: 116 MG/DL (ref 65–140)
GLUCOSE SERPL-MCNC: 117 MG/DL (ref 65–140)
GLUCOSE SERPL-MCNC: 118 MG/DL (ref 65–140)
GLUCOSE SERPL-MCNC: 120 MG/DL (ref 65–140)
GLUCOSE SERPL-MCNC: 121 MG/DL (ref 65–140)
GLUCOSE SERPL-MCNC: 121 MG/DL (ref 65–140)
GLUCOSE SERPL-MCNC: 127 MG/DL (ref 65–140)
GLUCOSE SERPL-MCNC: 127 MG/DL (ref 65–140)
GLUCOSE SERPL-MCNC: 128 MG/DL (ref 65–140)
GLUCOSE SERPL-MCNC: 133 MG/DL (ref 65–140)
GLUCOSE SERPL-MCNC: 138 MG/DL (ref 65–140)
GLUCOSE SERPL-MCNC: 138 MG/DL (ref 65–140)
GLUCOSE SERPL-MCNC: 141 MG/DL (ref 65–140)
GLUCOSE SERPL-MCNC: 142 MG/DL (ref 65–140)
GLUCOSE SERPL-MCNC: 143 MG/DL (ref 65–140)
GLUCOSE SERPL-MCNC: 147 MG/DL (ref 65–140)
GLUCOSE SERPL-MCNC: 148 MG/DL (ref 65–140)
GLUCOSE SERPL-MCNC: 149 MG/DL (ref 65–140)
GLUCOSE SERPL-MCNC: 153 MG/DL (ref 65–140)
GLUCOSE SERPL-MCNC: 156 MG/DL (ref 65–140)
GLUCOSE SERPL-MCNC: 156 MG/DL (ref 65–140)
GLUCOSE SERPL-MCNC: 157 MG/DL (ref 65–140)
GLUCOSE SERPL-MCNC: 158 MG/DL (ref 65–140)
GLUCOSE SERPL-MCNC: 158 MG/DL (ref 65–140)
GLUCOSE SERPL-MCNC: 159 MG/DL (ref 65–140)
GLUCOSE SERPL-MCNC: 164 MG/DL (ref 65–140)
GLUCOSE SERPL-MCNC: 171 MG/DL (ref 65–140)
GLUCOSE SERPL-MCNC: 175 MG/DL (ref 65–140)
GLUCOSE SERPL-MCNC: 177 MG/DL (ref 65–140)
GLUCOSE SERPL-MCNC: 178 MG/DL (ref 65–140)
GLUCOSE SERPL-MCNC: 192 MG/DL (ref 65–140)
GLUCOSE SERPL-MCNC: 193 MG/DL (ref 65–140)
GLUCOSE SERPL-MCNC: 196 MG/DL (ref 65–140)
GLUCOSE SERPL-MCNC: 207 MG/DL (ref 65–140)
GLUCOSE SERPL-MCNC: 208 MG/DL (ref 65–140)
GLUCOSE SERPL-MCNC: 213 MG/DL (ref 65–140)
GLUCOSE SERPL-MCNC: 249 MG/DL (ref 65–140)
GLUCOSE SERPL-MCNC: 261 MG/DL (ref 65–140)
GLUCOSE SERPL-MCNC: 262 MG/DL (ref 65–140)
GLUCOSE SERPL-MCNC: 268 MG/DL (ref 65–140)
GLUCOSE SERPL-MCNC: 283 MG/DL (ref 65–140)
GLUCOSE SERPL-MCNC: 284 MG/DL (ref 65–140)
GLUCOSE SERPL-MCNC: 307 MG/DL (ref 65–140)
GLUCOSE SERPL-MCNC: 328 MG/DL (ref 65–140)
GLUCOSE SERPL-MCNC: 430 MG/DL (ref 65–140)
GLUCOSE SERPL-MCNC: 444 MG/DL (ref 65–140)
GLUCOSE SERPL-MCNC: 500 MG/DL (ref 65–140)
GLUCOSE SERPL-MCNC: 786 MG/DL (ref 65–140)
GLUCOSE SERPL-MCNC: >500 MG/DL (ref 65–140)
GLUCOSE UR STRIP-MCNC: ABNORMAL MG/DL
GLUCOSE UR STRIP-MCNC: ABNORMAL MG/DL
HAV IGM SER QL: NORMAL
HBA1C MFR BLD: 12.5 %
HBV CORE IGM SER QL: NORMAL
HBV SURFACE AG SER QL: NORMAL
HCO3 BLDA-SCNC: 20.3 MMOL/L (ref 22–28)
HCO3 BLDV-SCNC: 21 MMOL/L (ref 24–30)
HCT VFR BLD AUTO: 34.1 % (ref 36.5–49.3)
HCT VFR BLD AUTO: 37.3 % (ref 36.5–49.3)
HCT VFR BLD AUTO: 38.4 % (ref 36.5–49.3)
HCT VFR BLD AUTO: 40.8 % (ref 36.5–49.3)
HCT VFR BLD AUTO: 42.8 % (ref 36.5–49.3)
HCT VFR BLD AUTO: 44.6 % (ref 36.5–49.3)
HCV AB SER QL: NORMAL
HEMOCCULT STL QL: ABNORMAL
HEMOCCULT STL QL: ABNORMAL
HEMOCCULT STL QL: POSITIVE
HGB BLD-MCNC: 11.6 G/DL (ref 12–17)
HGB BLD-MCNC: 12.7 G/DL (ref 12–17)
HGB BLD-MCNC: 12.9 G/DL (ref 12–17)
HGB BLD-MCNC: 14 G/DL (ref 12–17)
HGB BLD-MCNC: 15.1 G/DL (ref 12–17)
HGB BLD-MCNC: 15.3 G/DL (ref 12–17)
HGB UR QL STRIP.AUTO: ABNORMAL
HGB UR QL STRIP.AUTO: NEGATIVE
KETONES UR STRIP-MCNC: NEGATIVE MG/DL
KETONES UR STRIP-MCNC: NEGATIVE MG/DL
LACTATE SERPL-SCNC: 2 MMOL/L (ref 0.5–2)
LACTATE SERPL-SCNC: 4.8 MMOL/L (ref 0.5–2)
LACTATE SERPL-SCNC: 5.5 MMOL/L (ref 0.5–2)
LACTATE SERPL-SCNC: 6.4 MMOL/L (ref 0.5–2)
LEUKOCYTE ESTERASE UR QL STRIP: ABNORMAL
LEUKOCYTE ESTERASE UR QL STRIP: ABNORMAL
LIPASE SERPL-CCNC: 17 U/L (ref 73–393)
LIPASE SERPL-CCNC: 63 U/L (ref 73–393)
LYMPHOCYTES # BLD AUTO: 0.74 THOUSAND/UL (ref 0.6–4.47)
LYMPHOCYTES # BLD AUTO: 8 % (ref 14–44)
MAGNESIUM SERPL-MCNC: 2 MG/DL (ref 1.6–2.6)
MAGNESIUM SERPL-MCNC: 2.1 MG/DL (ref 1.6–2.6)
MAGNESIUM SERPL-MCNC: 2.1 MG/DL (ref 1.6–2.6)
MAGNESIUM SERPL-MCNC: 2.3 MG/DL (ref 1.6–2.6)
MAGNESIUM SERPL-MCNC: 2.3 MG/DL (ref 1.6–2.6)
MAGNESIUM SERPL-MCNC: 2.7 MG/DL (ref 1.6–2.6)
MCH RBC QN AUTO: 29.5 PG (ref 26.8–34.3)
MCH RBC QN AUTO: 29.7 PG (ref 26.8–34.3)
MCH RBC QN AUTO: 29.9 PG (ref 26.8–34.3)
MCH RBC QN AUTO: 29.9 PG (ref 26.8–34.3)
MCH RBC QN AUTO: 30 PG (ref 26.8–34.3)
MCH RBC QN AUTO: 30.1 PG (ref 26.8–34.3)
MCHC RBC AUTO-ENTMCNC: 33.1 G/DL (ref 31.4–37.4)
MCHC RBC AUTO-ENTMCNC: 34 G/DL (ref 31.4–37.4)
MCHC RBC AUTO-ENTMCNC: 34.3 G/DL (ref 31.4–37.4)
MCHC RBC AUTO-ENTMCNC: 34.3 G/DL (ref 31.4–37.4)
MCHC RBC AUTO-ENTMCNC: 34.6 G/DL (ref 31.4–37.4)
MCHC RBC AUTO-ENTMCNC: 35.3 G/DL (ref 31.4–37.4)
MCV RBC AUTO: 85 FL (ref 82–98)
MCV RBC AUTO: 86 FL (ref 82–98)
MCV RBC AUTO: 86 FL (ref 82–98)
MCV RBC AUTO: 87 FL (ref 82–98)
MCV RBC AUTO: 88 FL (ref 82–98)
MCV RBC AUTO: 90 FL (ref 82–98)
MONOCYTES # BLD AUTO: 0.37 THOUSAND/UL (ref 0–1.22)
MONOCYTES NFR BLD: 4 % (ref 4–12)
NEUTROPHILS # BLD MANUAL: 8.09 THOUSAND/UL (ref 1.85–7.62)
NEUTS BAND NFR BLD MANUAL: 3 % (ref 0–8)
NEUTS SEG NFR BLD AUTO: 85 % (ref 43–75)
NITRITE UR QL STRIP: NEGATIVE
NITRITE UR QL STRIP: NEGATIVE
NON VENT ROOM AIR: 21 %
NON-SQ EPI CELLS URNS QL MICRO: ABNORMAL /HPF
NON-SQ EPI CELLS URNS QL MICRO: NORMAL /HPF
NRBC BLD AUTO-RTO: 0 /100 WBCS
NRBC BLD AUTO-RTO: 0 /100 WBCS
O2 CT BLDA-SCNC: 18.5 ML/DL (ref 16–23)
O2 CT BLDV-SCNC: 11.4 ML/DL
OXYHGB MFR BLDA: 93 % (ref 94–97)
P AXIS: 66 DEGREES
PCO2 BLDA: 33.4 MM HG (ref 36–44)
PCO2 BLDV: 46.4 MM HG (ref 42–50)
PH BLDA: 7.4 [PH] (ref 7.35–7.45)
PH BLDV: 7.27 [PH] (ref 7.3–7.4)
PH UR STRIP.AUTO: 5 [PH]
PH UR STRIP.AUTO: 5.5 [PH]
PHOSPHATE SERPL-MCNC: 2 MG/DL (ref 2.7–4.5)
PHOSPHATE SERPL-MCNC: 2.5 MG/DL (ref 2.7–4.5)
PHOSPHATE SERPL-MCNC: 2.9 MG/DL (ref 2.7–4.5)
PLATELET # BLD AUTO: 154 THOUSANDS/UL (ref 149–390)
PLATELET # BLD AUTO: 166 THOUSANDS/UL (ref 149–390)
PLATELET # BLD AUTO: 181 THOUSANDS/UL (ref 149–390)
PLATELET # BLD AUTO: 200 THOUSANDS/UL (ref 149–390)
PLATELET # BLD AUTO: 202 THOUSANDS/UL (ref 149–390)
PLATELET # BLD AUTO: 218 THOUSANDS/UL (ref 149–390)
PLATELET BLD QL SMEAR: ADEQUATE
PMV BLD AUTO: 9 FL (ref 8.9–12.7)
PMV BLD AUTO: 9.1 FL (ref 8.9–12.7)
PMV BLD AUTO: 9.2 FL (ref 8.9–12.7)
PMV BLD AUTO: 9.2 FL (ref 8.9–12.7)
PMV BLD AUTO: 9.7 FL (ref 8.9–12.7)
PMV BLD AUTO: 9.8 FL (ref 8.9–12.7)
PO2 BLDA: 76.6 MM HG (ref 75–129)
PO2 BLDV: 36.6 MM HG (ref 35–45)
POTASSIUM SERPL-SCNC: 2.8 MMOL/L (ref 3.5–5.3)
POTASSIUM SERPL-SCNC: 2.9 MMOL/L (ref 3.5–5.3)
POTASSIUM SERPL-SCNC: 3 MMOL/L (ref 3.5–5.3)
POTASSIUM SERPL-SCNC: 3.3 MMOL/L (ref 3.5–5.3)
POTASSIUM SERPL-SCNC: 3.5 MMOL/L (ref 3.5–5.3)
POTASSIUM SERPL-SCNC: 3.6 MMOL/L (ref 3.5–5.3)
POTASSIUM SERPL-SCNC: 3.9 MMOL/L (ref 3.5–5.3)
POTASSIUM SERPL-SCNC: 4 MMOL/L (ref 3.5–5.3)
PR INTERVAL: 132 MS
PROCALCITONIN SERPL-MCNC: 22.95 NG/ML
PROCALCITONIN SERPL-MCNC: 29.74 NG/ML
PROT SERPL-MCNC: 5.1 G/DL (ref 6.4–8.2)
PROT SERPL-MCNC: 5.4 G/DL (ref 6.4–8.2)
PROT SERPL-MCNC: 5.6 G/DL (ref 6.4–8.2)
PROT SERPL-MCNC: 6.4 G/DL (ref 6.4–8.2)
PROT UR STRIP-MCNC: ABNORMAL MG/DL
PROT UR STRIP-MCNC: ABNORMAL MG/DL
QRS AXIS: 58 DEGREES
QRSD INTERVAL: 88 MS
QT INTERVAL: 372 MS
QTC INTERVAL: 482 MS
RBC # BLD AUTO: 3.86 MILLION/UL (ref 3.88–5.62)
RBC # BLD AUTO: 4.28 MILLION/UL (ref 3.88–5.62)
RBC # BLD AUTO: 4.32 MILLION/UL (ref 3.88–5.62)
RBC # BLD AUTO: 4.74 MILLION/UL (ref 3.88–5.62)
RBC # BLD AUTO: 5.04 MILLION/UL (ref 3.88–5.62)
RBC # BLD AUTO: 5.12 MILLION/UL (ref 3.88–5.62)
RBC #/AREA URNS AUTO: ABNORMAL /HPF
RBC #/AREA URNS AUTO: NORMAL /HPF
RV AG STL QL: NEGATIVE
SALMONELLA DNA SPEC QL NAA+PROBE: NORMAL
SARS-COV-2 RNA RESP QL NAA+PROBE: NEGATIVE
SHIGA TOXIN STX GENE SPEC NAA+PROBE: NORMAL
SHIGELLA DNA SPEC QL NAA+PROBE: NORMAL
SODIUM SERPL-SCNC: 130 MMOL/L (ref 136–145)
SODIUM SERPL-SCNC: 134 MMOL/L (ref 136–145)
SODIUM SERPL-SCNC: 137 MMOL/L (ref 136–145)
SODIUM SERPL-SCNC: 141 MMOL/L (ref 136–145)
SODIUM SERPL-SCNC: 141 MMOL/L (ref 136–145)
SODIUM SERPL-SCNC: 142 MMOL/L (ref 136–145)
SODIUM SERPL-SCNC: 143 MMOL/L (ref 136–145)
SODIUM SERPL-SCNC: 144 MMOL/L (ref 136–145)
SP GR UR STRIP.AUTO: <=1.005 (ref 1–1.03)
SP GR UR STRIP.AUTO: <=1.005 (ref 1–1.03)
SPECIMEN SOURCE: ABNORMAL
T WAVE AXIS: 33 DEGREES
TOTAL CELLS COUNTED SPEC: 100
TROPONIN I SERPL-MCNC: <0.02 NG/ML
UROBILINOGEN UR QL STRIP.AUTO: 0.2 E.U./DL
UROBILINOGEN UR QL STRIP.AUTO: 0.2 E.U./DL
VENTRICULAR RATE: 101 BPM
WBC # BLD AUTO: 12.81 THOUSAND/UL (ref 4.31–10.16)
WBC # BLD AUTO: 6.84 THOUSAND/UL (ref 4.31–10.16)
WBC # BLD AUTO: 7.34 THOUSAND/UL (ref 4.31–10.16)
WBC # BLD AUTO: 8.84 THOUSAND/UL (ref 4.31–10.16)
WBC # BLD AUTO: 9.19 THOUSAND/UL (ref 4.31–10.16)
WBC # BLD AUTO: 9.97 THOUSAND/UL (ref 4.31–10.16)
WBC #/AREA URNS AUTO: ABNORMAL /HPF
WBC #/AREA URNS AUTO: NORMAL /HPF
WBC SPEC QL GRAM STN: ABNORMAL

## 2021-01-01 PROCEDURE — 82948 REAGENT STRIP/BLOOD GLUCOSE: CPT

## 2021-01-01 PROCEDURE — 87505 NFCT AGENT DETECTION GI: CPT | Performed by: INTERNAL MEDICINE

## 2021-01-01 PROCEDURE — 83735 ASSAY OF MAGNESIUM: CPT | Performed by: INTERNAL MEDICINE

## 2021-01-01 PROCEDURE — 83735 ASSAY OF MAGNESIUM: CPT | Performed by: NURSE PRACTITIONER

## 2021-01-01 PROCEDURE — 99222 1ST HOSP IP/OBS MODERATE 55: CPT | Performed by: SURGERY

## 2021-01-01 PROCEDURE — 80074 ACUTE HEPATITIS PANEL: CPT | Performed by: INTERNAL MEDICINE

## 2021-01-01 PROCEDURE — 82330 ASSAY OF CALCIUM: CPT | Performed by: INTERNAL MEDICINE

## 2021-01-01 PROCEDURE — 87086 URINE CULTURE/COLONY COUNT: CPT | Performed by: INTERNAL MEDICINE

## 2021-01-01 PROCEDURE — 84484 ASSAY OF TROPONIN QUANT: CPT | Performed by: PHYSICIAN ASSISTANT

## 2021-01-01 PROCEDURE — 74018 RADEX ABDOMEN 1 VIEW: CPT

## 2021-01-01 PROCEDURE — 83690 ASSAY OF LIPASE: CPT | Performed by: PHYSICIAN ASSISTANT

## 2021-01-01 PROCEDURE — 03HB33Z INSERTION OF INFUSION DEVICE INTO RIGHT RADIAL ARTERY, PERCUTANEOUS APPROACH: ICD-10-PCS | Performed by: INTERNAL MEDICINE

## 2021-01-01 PROCEDURE — 85007 BL SMEAR W/DIFF WBC COUNT: CPT | Performed by: INTERNAL MEDICINE

## 2021-01-01 PROCEDURE — 87209 SMEAR COMPLEX STAIN: CPT | Performed by: INTERNAL MEDICINE

## 2021-01-01 PROCEDURE — 82550 ASSAY OF CK (CPK): CPT | Performed by: PHYSICIAN ASSISTANT

## 2021-01-01 PROCEDURE — 82805 BLOOD GASES W/O2 SATURATION: CPT | Performed by: PHYSICIAN ASSISTANT

## 2021-01-01 PROCEDURE — 99204 OFFICE O/P NEW MOD 45 MIN: CPT | Performed by: FAMILY MEDICINE

## 2021-01-01 PROCEDURE — 83690 ASSAY OF LIPASE: CPT | Performed by: NURSE PRACTITIONER

## 2021-01-01 PROCEDURE — 99291 CRITICAL CARE FIRST HOUR: CPT | Performed by: PHYSICIAN ASSISTANT

## 2021-01-01 PROCEDURE — 84100 ASSAY OF PHOSPHORUS: CPT | Performed by: NURSE PRACTITIONER

## 2021-01-01 PROCEDURE — C9113 INJ PANTOPRAZOLE SODIUM, VIA: HCPCS | Performed by: INTERNAL MEDICINE

## 2021-01-01 PROCEDURE — 99291 CRITICAL CARE FIRST HOUR: CPT | Performed by: INTERNAL MEDICINE

## 2021-01-01 PROCEDURE — NC001 PR NO CHARGE: Performed by: INTERNAL MEDICINE

## 2021-01-01 PROCEDURE — 97116 GAIT TRAINING THERAPY: CPT

## 2021-01-01 PROCEDURE — 83605 ASSAY OF LACTIC ACID: CPT | Performed by: PHYSICIAN ASSISTANT

## 2021-01-01 PROCEDURE — 99254 IP/OBS CNSLTJ NEW/EST MOD 60: CPT | Performed by: SURGERY

## 2021-01-01 PROCEDURE — 70450 CT HEAD/BRAIN W/O DYE: CPT

## 2021-01-01 PROCEDURE — 84145 PROCALCITONIN (PCT): CPT | Performed by: INTERNAL MEDICINE

## 2021-01-01 PROCEDURE — 1036F TOBACCO NON-USER: CPT | Performed by: FAMILY MEDICINE

## 2021-01-01 PROCEDURE — 36620 INSERTION CATHETER ARTERY: CPT | Performed by: INTERNAL MEDICINE

## 2021-01-01 PROCEDURE — 96360 HYDRATION IV INFUSION INIT: CPT

## 2021-01-01 PROCEDURE — 99232 SBSQ HOSP IP/OBS MODERATE 35: CPT | Performed by: INTERNAL MEDICINE

## 2021-01-01 PROCEDURE — 80053 COMPREHEN METABOLIC PANEL: CPT | Performed by: PHYSICIAN ASSISTANT

## 2021-01-01 PROCEDURE — 71260 CT THORAX DX C+: CPT

## 2021-01-01 PROCEDURE — 87177 OVA AND PARASITES SMEARS: CPT | Performed by: INTERNAL MEDICINE

## 2021-01-01 PROCEDURE — 87040 BLOOD CULTURE FOR BACTERIA: CPT | Performed by: NURSE PRACTITIONER

## 2021-01-01 PROCEDURE — 85027 COMPLETE CBC AUTOMATED: CPT | Performed by: PHYSICIAN ASSISTANT

## 2021-01-01 PROCEDURE — 83605 ASSAY OF LACTIC ACID: CPT | Performed by: INTERNAL MEDICINE

## 2021-01-01 PROCEDURE — 91301 SARS-COV-2 / COVID-19 MRNA VACCINE (MODERNA) 100 MCG: CPT

## 2021-01-01 PROCEDURE — 84484 ASSAY OF TROPONIN QUANT: CPT | Performed by: INTERNAL MEDICINE

## 2021-01-01 PROCEDURE — 80048 BASIC METABOLIC PNL TOTAL CA: CPT | Performed by: INTERNAL MEDICINE

## 2021-01-01 PROCEDURE — 3008F BODY MASS INDEX DOCD: CPT | Performed by: FAMILY MEDICINE

## 2021-01-01 PROCEDURE — 74177 CT ABD & PELVIS W/CONTRAST: CPT

## 2021-01-01 PROCEDURE — 85027 COMPLETE CBC AUTOMATED: CPT | Performed by: INTERNAL MEDICINE

## 2021-01-01 PROCEDURE — 80143 DRUG ASSAY ACETAMINOPHEN: CPT | Performed by: INTERNAL MEDICINE

## 2021-01-01 PROCEDURE — 87329 GIARDIA AG IA: CPT | Performed by: INTERNAL MEDICINE

## 2021-01-01 PROCEDURE — 99213 OFFICE O/P EST LOW 20 MIN: CPT | Performed by: FAMILY MEDICINE

## 2021-01-01 PROCEDURE — 84100 ASSAY OF PHOSPHORUS: CPT | Performed by: INTERNAL MEDICINE

## 2021-01-01 PROCEDURE — 72125 CT NECK SPINE W/O DYE: CPT

## 2021-01-01 PROCEDURE — 36600 WITHDRAWAL OF ARTERIAL BLOOD: CPT

## 2021-01-01 PROCEDURE — 82805 BLOOD GASES W/O2 SATURATION: CPT | Performed by: INTERNAL MEDICINE

## 2021-01-01 PROCEDURE — 83735 ASSAY OF MAGNESIUM: CPT | Performed by: PHYSICIAN ASSISTANT

## 2021-01-01 PROCEDURE — 97163 PT EVAL HIGH COMPLEX 45 MIN: CPT

## 2021-01-01 PROCEDURE — 82550 ASSAY OF CK (CPK): CPT | Performed by: INTERNAL MEDICINE

## 2021-01-01 PROCEDURE — 82272 OCCULT BLD FECES 1-3 TESTS: CPT | Performed by: INTERNAL MEDICINE

## 2021-01-01 PROCEDURE — 0012A SARS-COV-2 / COVID-19 MRNA VACCINE (MODERNA) 100 MCG: CPT

## 2021-01-01 PROCEDURE — U0003 INFECTIOUS AGENT DETECTION BY NUCLEIC ACID (DNA OR RNA); SEVERE ACUTE RESPIRATORY SYNDROME CORONAVIRUS 2 (SARS-COV-2) (CORONAVIRUS DISEASE [COVID-19]), AMPLIFIED PROBE TECHNIQUE, MAKING USE OF HIGH THROUGHPUT TECHNOLOGIES AS DESCRIBED BY CMS-2020-01-R: HCPCS | Performed by: PHYSICIAN ASSISTANT

## 2021-01-01 PROCEDURE — 36415 COLL VENOUS BLD VENIPUNCTURE: CPT | Performed by: PHYSICIAN ASSISTANT

## 2021-01-01 PROCEDURE — 71045 X-RAY EXAM CHEST 1 VIEW: CPT

## 2021-01-01 PROCEDURE — 20610 DRAIN/INJ JOINT/BURSA W/O US: CPT | Performed by: FAMILY MEDICINE

## 2021-01-01 PROCEDURE — 96372 THER/PROPH/DIAG INJ SC/IM: CPT

## 2021-01-01 PROCEDURE — 97167 OT EVAL HIGH COMPLEX 60 MIN: CPT

## 2021-01-01 PROCEDURE — 80053 COMPREHEN METABOLIC PANEL: CPT | Performed by: INTERNAL MEDICINE

## 2021-01-01 PROCEDURE — NC001 PR NO CHARGE: Performed by: EMERGENCY MEDICINE

## 2021-01-01 PROCEDURE — 96374 THER/PROPH/DIAG INJ IV PUSH: CPT

## 2021-01-01 PROCEDURE — 76937 US GUIDE VASCULAR ACCESS: CPT | Performed by: INTERNAL MEDICINE

## 2021-01-01 PROCEDURE — 85027 COMPLETE CBC AUTOMATED: CPT | Performed by: NURSE PRACTITIONER

## 2021-01-01 PROCEDURE — G0439 PPPS, SUBSEQ VISIT: HCPCS | Performed by: FAMILY MEDICINE

## 2021-01-01 PROCEDURE — 93005 ELECTROCARDIOGRAM TRACING: CPT

## 2021-01-01 PROCEDURE — 0011A SARS-COV-2 / COVID-19 MRNA VACCINE (MODERNA) 100 MCG: CPT

## 2021-01-01 PROCEDURE — 82010 KETONE BODYS QUAN: CPT | Performed by: PHYSICIAN ASSISTANT

## 2021-01-01 PROCEDURE — 97110 THERAPEUTIC EXERCISES: CPT

## 2021-01-01 PROCEDURE — 81001 URINALYSIS AUTO W/SCOPE: CPT | Performed by: INTERNAL MEDICINE

## 2021-01-01 PROCEDURE — 99239 HOSP IP/OBS DSCHRG MGMT >30: CPT | Performed by: INTERNAL MEDICINE

## 2021-01-01 PROCEDURE — 99233 SBSQ HOSP IP/OBS HIGH 50: CPT | Performed by: INTERNAL MEDICINE

## 2021-01-01 PROCEDURE — 89055 LEUKOCYTE ASSESSMENT FECAL: CPT | Performed by: INTERNAL MEDICINE

## 2021-01-01 PROCEDURE — 83036 HEMOGLOBIN GLYCOSYLATED A1C: CPT | Performed by: INTERNAL MEDICINE

## 2021-01-01 PROCEDURE — 87040 BLOOD CULTURE FOR BACTERIA: CPT | Performed by: PHYSICIAN ASSISTANT

## 2021-01-01 PROCEDURE — 06HY33Z INSERTION OF INFUSION DEVICE INTO LOWER VEIN, PERCUTANEOUS APPROACH: ICD-10-PCS | Performed by: INTERNAL MEDICINE

## 2021-01-01 PROCEDURE — 3725F SCREEN DEPRESSION PERFORMED: CPT | Performed by: FAMILY MEDICINE

## 2021-01-01 PROCEDURE — U0005 INFEC AGEN DETEC AMPLI PROBE: HCPCS | Performed by: PHYSICIAN ASSISTANT

## 2021-01-01 PROCEDURE — 93010 ELECTROCARDIOGRAM REPORT: CPT | Performed by: INTERNAL MEDICINE

## 2021-01-01 PROCEDURE — 87425 ROTAVIRUS AG IA: CPT | Performed by: NURSE PRACTITIONER

## 2021-01-01 PROCEDURE — 99232 SBSQ HOSP IP/OBS MODERATE 35: CPT | Performed by: SURGERY

## 2021-01-01 PROCEDURE — 87493 C DIFF AMPLIFIED PROBE: CPT | Performed by: INTERNAL MEDICINE

## 2021-01-01 PROCEDURE — 99285 EMERGENCY DEPT VISIT HI MDM: CPT

## 2021-01-01 RX ORDER — ACETAMINOPHEN 325 MG/1
650 TABLET ORAL EVERY 6 HOURS PRN
Status: DISCONTINUED | OUTPATIENT
Start: 2021-01-01 | End: 2021-01-01 | Stop reason: HOSPADM

## 2021-01-01 RX ORDER — CALCIUM GLUCONATE 20 MG/ML
1 INJECTION, SOLUTION INTRAVENOUS ONCE
Status: COMPLETED | OUTPATIENT
Start: 2021-01-01 | End: 2021-01-01

## 2021-01-01 RX ORDER — HEPARIN SODIUM 5000 [USP'U]/ML
5000 INJECTION, SOLUTION INTRAVENOUS; SUBCUTANEOUS EVERY 8 HOURS SCHEDULED
Status: CANCELLED | OUTPATIENT
Start: 2021-01-01

## 2021-01-01 RX ORDER — POTASSIUM CHLORIDE 29.8 MG/ML
40 INJECTION INTRAVENOUS ONCE
Status: COMPLETED | OUTPATIENT
Start: 2021-01-01 | End: 2021-01-01

## 2021-01-01 RX ORDER — ONDANSETRON 2 MG/ML
4 INJECTION INTRAMUSCULAR; INTRAVENOUS EVERY 6 HOURS PRN
Status: CANCELLED | OUTPATIENT
Start: 2021-01-01

## 2021-01-01 RX ORDER — DOXEPIN HYDROCHLORIDE 75 MG/1
75 CAPSULE ORAL
COMMUNITY
Start: 2021-01-01

## 2021-01-01 RX ORDER — SODIUM CHLORIDE, SODIUM GLUCONATE, SODIUM ACETATE, POTASSIUM CHLORIDE, MAGNESIUM CHLORIDE, SODIUM PHOSPHATE, DIBASIC, AND POTASSIUM PHOSPHATE .53; .5; .37; .037; .03; .012; .00082 G/100ML; G/100ML; G/100ML; G/100ML; G/100ML; G/100ML; G/100ML
250 INJECTION, SOLUTION INTRAVENOUS CONTINUOUS
Status: CANCELLED | OUTPATIENT
Start: 2021-01-01

## 2021-01-01 RX ORDER — DEXTROSE MONOHYDRATE 25 G/50ML
25 INJECTION, SOLUTION INTRAVENOUS DAILY PRN
Status: CANCELLED | OUTPATIENT
Start: 2021-01-01

## 2021-01-01 RX ORDER — SODIUM CHLORIDE, SODIUM GLUCONATE, SODIUM ACETATE, POTASSIUM CHLORIDE, MAGNESIUM CHLORIDE, SODIUM PHOSPHATE, DIBASIC, AND POTASSIUM PHOSPHATE .53; .5; .37; .037; .03; .012; .00082 G/100ML; G/100ML; G/100ML; G/100ML; G/100ML; G/100ML; G/100ML
250 INJECTION, SOLUTION INTRAVENOUS CONTINUOUS
Status: DISCONTINUED | OUTPATIENT
Start: 2021-01-01 | End: 2021-01-01

## 2021-01-01 RX ORDER — PANTOPRAZOLE SODIUM 40 MG/1
40 INJECTION, POWDER, FOR SOLUTION INTRAVENOUS
Status: DISCONTINUED | OUTPATIENT
Start: 2021-01-01 | End: 2021-01-01

## 2021-01-01 RX ORDER — DEXTROSE AND SODIUM CHLORIDE 5; .9 G/100ML; G/100ML
250 INJECTION, SOLUTION INTRAVENOUS CONTINUOUS
Status: DISCONTINUED | OUTPATIENT
Start: 2021-01-01 | End: 2021-01-01

## 2021-01-01 RX ORDER — CIPROFLOXACIN 500 MG/1
500 TABLET, FILM COATED ORAL EVERY 12 HOURS SCHEDULED
Status: DISCONTINUED | OUTPATIENT
Start: 2021-01-01 | End: 2021-01-01 | Stop reason: HOSPADM

## 2021-01-01 RX ORDER — PANTOPRAZOLE SODIUM 40 MG/1
40 TABLET, DELAYED RELEASE ORAL
Status: DISCONTINUED | OUTPATIENT
Start: 2021-01-01 | End: 2021-01-01

## 2021-01-01 RX ORDER — SODIUM CHLORIDE 9 MG/ML
3 INJECTION INTRAVENOUS
Status: CANCELLED | OUTPATIENT
Start: 2021-01-01

## 2021-01-01 RX ORDER — METRONIDAZOLE 500 MG/1
500 TABLET ORAL EVERY 8 HOURS SCHEDULED
Status: DISCONTINUED | OUTPATIENT
Start: 2021-01-01 | End: 2021-01-01 | Stop reason: HOSPADM

## 2021-01-01 RX ORDER — SODIUM CHLORIDE 9 MG/ML
250 INJECTION, SOLUTION INTRAVENOUS CONTINUOUS
Status: DISCONTINUED | OUTPATIENT
Start: 2021-01-01 | End: 2021-01-01

## 2021-01-01 RX ORDER — SODIUM CHLORIDE 9 MG/ML
3 INJECTION INTRAVENOUS
Status: DISCONTINUED | OUTPATIENT
Start: 2021-01-01 | End: 2021-01-01 | Stop reason: HOSPADM

## 2021-01-01 RX ORDER — SODIUM CHLORIDE, SODIUM GLUCONATE, SODIUM ACETATE, POTASSIUM CHLORIDE, MAGNESIUM CHLORIDE, SODIUM PHOSPHATE, DIBASIC, AND POTASSIUM PHOSPHATE .53; .5; .37; .037; .03; .012; .00082 G/100ML; G/100ML; G/100ML; G/100ML; G/100ML; G/100ML; G/100ML
125 INJECTION, SOLUTION INTRAVENOUS CONTINUOUS
Status: DISCONTINUED | OUTPATIENT
Start: 2021-01-01 | End: 2021-01-01

## 2021-01-01 RX ORDER — MELOXICAM 15 MG/1
15 TABLET ORAL DAILY
Qty: 30 TABLET | Refills: 5 | Status: SHIPPED | OUTPATIENT
Start: 2021-01-01

## 2021-01-01 RX ORDER — SODIUM CHLORIDE, SODIUM GLUCONATE, SODIUM ACETATE, POTASSIUM CHLORIDE, MAGNESIUM CHLORIDE, SODIUM PHOSPHATE, DIBASIC, AND POTASSIUM PHOSPHATE .53; .5; .37; .037; .03; .012; .00082 G/100ML; G/100ML; G/100ML; G/100ML; G/100ML; G/100ML; G/100ML
250 INJECTION, SOLUTION INTRAVENOUS CONTINUOUS
Status: DISCONTINUED | OUTPATIENT
Start: 2021-01-01 | End: 2021-01-01 | Stop reason: HOSPADM

## 2021-01-01 RX ORDER — CIPROFLOXACIN 2 MG/ML
400 INJECTION, SOLUTION INTRAVENOUS EVERY 12 HOURS
Status: DISCONTINUED | OUTPATIENT
Start: 2021-01-01 | End: 2021-01-01

## 2021-01-01 RX ORDER — PANTOPRAZOLE SODIUM 40 MG/1
40 TABLET, DELAYED RELEASE ORAL
Status: DISCONTINUED | OUTPATIENT
Start: 2021-01-01 | End: 2021-01-01 | Stop reason: HOSPADM

## 2021-01-01 RX ORDER — CIPROFLOXACIN 2 MG/ML
400 INJECTION, SOLUTION INTRAVENOUS EVERY 12 HOURS
Status: DISCONTINUED | OUTPATIENT
Start: 2021-01-01 | End: 2021-01-01 | Stop reason: HOSPADM

## 2021-01-01 RX ORDER — MELATONIN
1000 DAILY
Status: DISCONTINUED | OUTPATIENT
Start: 2021-01-01 | End: 2021-01-01 | Stop reason: HOSPADM

## 2021-01-01 RX ORDER — DEXTROSE MONOHYDRATE 25 G/50ML
25 INJECTION, SOLUTION INTRAVENOUS DAILY PRN
Status: DISCONTINUED | OUTPATIENT
Start: 2021-01-01 | End: 2021-01-01 | Stop reason: HOSPADM

## 2021-01-01 RX ORDER — HEPARIN SODIUM 5000 [USP'U]/ML
5000 INJECTION, SOLUTION INTRAVENOUS; SUBCUTANEOUS EVERY 8 HOURS SCHEDULED
Status: DISCONTINUED | OUTPATIENT
Start: 2021-01-01 | End: 2021-01-01 | Stop reason: HOSPADM

## 2021-01-01 RX ORDER — VANCOMYCIN HYDROCHLORIDE 1 G/200ML
1000 INJECTION, SOLUTION INTRAVENOUS EVERY 12 HOURS
Status: DISCONTINUED | OUTPATIENT
Start: 2021-01-01 | End: 2021-01-01

## 2021-01-01 RX ORDER — LIDOCAINE HYDROCHLORIDE 10 MG/ML
1 INJECTION, SOLUTION INFILTRATION; PERINEURAL
Status: COMPLETED | OUTPATIENT
Start: 2021-01-01 | End: 2021-01-01

## 2021-01-01 RX ORDER — CIPROFLOXACIN 500 MG/1
500 TABLET, FILM COATED ORAL EVERY 12 HOURS SCHEDULED
Qty: 6 TABLET | Refills: 0 | Status: SHIPPED | OUTPATIENT
Start: 2021-01-01 | End: 2021-01-01

## 2021-01-01 RX ORDER — SODIUM CHLORIDE, SODIUM GLUCONATE, SODIUM ACETATE, POTASSIUM CHLORIDE, MAGNESIUM CHLORIDE, SODIUM PHOSPHATE, DIBASIC, AND POTASSIUM PHOSPHATE .53; .5; .37; .037; .03; .012; .00082 G/100ML; G/100ML; G/100ML; G/100ML; G/100ML; G/100ML; G/100ML
125 INJECTION, SOLUTION INTRAVENOUS CONTINUOUS
Status: CANCELLED | OUTPATIENT
Start: 2021-01-01

## 2021-01-01 RX ORDER — DEXAMETHASONE SODIUM PHOSPHATE 100 MG/10ML
40 INJECTION INTRAMUSCULAR; INTRAVENOUS
Status: COMPLETED | OUTPATIENT
Start: 2021-01-01 | End: 2021-01-01

## 2021-01-01 RX ORDER — SODIUM CHLORIDE 9 MG/ML
2000 INJECTION, SOLUTION INTRAVENOUS CONTINUOUS
Status: DISCONTINUED | OUTPATIENT
Start: 2021-01-01 | End: 2021-01-01

## 2021-01-01 RX ORDER — CIPROFLOXACIN 2 MG/ML
400 INJECTION, SOLUTION INTRAVENOUS EVERY 12 HOURS
Status: CANCELLED | OUTPATIENT
Start: 2021-01-01

## 2021-01-01 RX ORDER — SODIUM CHLORIDE, SODIUM GLUCONATE, SODIUM ACETATE, POTASSIUM CHLORIDE, MAGNESIUM CHLORIDE, SODIUM PHOSPHATE, DIBASIC, AND POTASSIUM PHOSPHATE .53; .5; .37; .037; .03; .012; .00082 G/100ML; G/100ML; G/100ML; G/100ML; G/100ML; G/100ML; G/100ML
1000 INJECTION, SOLUTION INTRAVENOUS ONCE
Status: COMPLETED | OUTPATIENT
Start: 2021-01-01 | End: 2021-01-01

## 2021-01-01 RX ORDER — METRONIDAZOLE 500 MG/1
500 TABLET ORAL EVERY 8 HOURS SCHEDULED
Qty: 9 TABLET | Refills: 0 | Status: SHIPPED | OUTPATIENT
Start: 2021-01-01 | End: 2021-01-01

## 2021-01-01 RX ORDER — CEFEPIME HYDROCHLORIDE 2 G/50ML
2000 INJECTION, SOLUTION INTRAVENOUS EVERY 12 HOURS
Status: DISCONTINUED | OUTPATIENT
Start: 2021-01-01 | End: 2021-01-01

## 2021-01-01 RX ORDER — LISINOPRIL AND HYDROCHLOROTHIAZIDE 12.5; 1 MG/1; MG/1
1 TABLET ORAL DAILY
COMMUNITY

## 2021-01-01 RX ORDER — MELATONIN
1000 DAILY
Status: CANCELLED | OUTPATIENT
Start: 2021-01-01

## 2021-01-01 RX ORDER — SODIUM CHLORIDE, SODIUM GLUCONATE, SODIUM ACETATE, POTASSIUM CHLORIDE, MAGNESIUM CHLORIDE, SODIUM PHOSPHATE, DIBASIC, AND POTASSIUM PHOSPHATE .53; .5; .37; .037; .03; .012; .00082 G/100ML; G/100ML; G/100ML; G/100ML; G/100ML; G/100ML; G/100ML
125 INJECTION, SOLUTION INTRAVENOUS CONTINUOUS
Status: DISCONTINUED | OUTPATIENT
Start: 2021-01-01 | End: 2021-01-01 | Stop reason: HOSPADM

## 2021-01-01 RX ORDER — ONDANSETRON 2 MG/ML
4 INJECTION INTRAMUSCULAR; INTRAVENOUS EVERY 6 HOURS PRN
Status: DISCONTINUED | OUTPATIENT
Start: 2021-01-01 | End: 2021-01-01 | Stop reason: HOSPADM

## 2021-01-01 RX ORDER — AZITHROMYCIN 500 MG/1
500 TABLET, FILM COATED ORAL DAILY
Qty: 4 TABLET | Refills: 0 | Status: SHIPPED | OUTPATIENT
Start: 2021-01-01 | End: 2021-01-01

## 2021-01-01 RX ORDER — AZITHROMYCIN 250 MG/1
TABLET, FILM COATED ORAL
Qty: 6 TABLET | Refills: 0 | Status: SHIPPED | OUTPATIENT
Start: 2021-01-01 | End: 2021-01-01

## 2021-01-01 RX ORDER — SODIUM CHLORIDE 9 MG/ML
500 INJECTION, SOLUTION INTRAVENOUS CONTINUOUS
Status: DISCONTINUED | OUTPATIENT
Start: 2021-01-01 | End: 2021-01-01

## 2021-01-01 RX ORDER — INSULIN GLARGINE 100 [IU]/ML
15 INJECTION, SOLUTION SUBCUTANEOUS
Status: DISCONTINUED | OUTPATIENT
Start: 2021-01-01 | End: 2021-01-01 | Stop reason: HOSPADM

## 2021-01-01 RX ORDER — HYDROMORPHONE HCL/PF 1 MG/ML
0.5 SYRINGE (ML) INJECTION
Status: DISCONTINUED | OUTPATIENT
Start: 2021-01-01 | End: 2021-01-01

## 2021-01-01 RX ADMIN — METRONIDAZOLE 500 MG: 500 INJECTION, SOLUTION INTRAVENOUS at 02:14

## 2021-01-01 RX ADMIN — HEPARIN SODIUM 5000 UNITS: 5000 INJECTION INTRAVENOUS; SUBCUTANEOUS at 05:13

## 2021-01-01 RX ADMIN — METRONIDAZOLE 500 MG: 500 INJECTION, SOLUTION INTRAVENOUS at 09:10

## 2021-01-01 RX ADMIN — SODIUM CHLORIDE 1000 ML: 0.9 INJECTION, SOLUTION INTRAVENOUS at 12:00

## 2021-01-01 RX ADMIN — VASOPRESSIN 0.04 UNITS/MIN: 20 INJECTION INTRAVENOUS at 02:41

## 2021-01-01 RX ADMIN — METRONIDAZOLE 500 MG: 500 TABLET ORAL at 21:22

## 2021-01-01 RX ADMIN — Medication 1000 UNITS: at 08:00

## 2021-01-01 RX ADMIN — HEPARIN SODIUM 5000 UNITS: 5000 INJECTION INTRAVENOUS; SUBCUTANEOUS at 22:01

## 2021-01-01 RX ADMIN — CIPROFLOXACIN 400 MG: 2 INJECTION, SOLUTION INTRAVENOUS at 15:39

## 2021-01-01 RX ADMIN — METRONIDAZOLE 500 MG: 500 TABLET ORAL at 05:04

## 2021-01-01 RX ADMIN — METRONIDAZOLE 500 MG: 500 INJECTION, SOLUTION INTRAVENOUS at 18:14

## 2021-01-01 RX ADMIN — VANCOMYCIN HYDROCHLORIDE 500 MG: 5 INJECTION, POWDER, LYOPHILIZED, FOR SOLUTION INTRAVENOUS at 23:42

## 2021-01-01 RX ADMIN — INSULIN HUMAN 5 UNITS: 100 INJECTION, SOLUTION PARENTERAL at 16:05

## 2021-01-01 RX ADMIN — POTASSIUM CHLORIDE 40 MEQ: 29.8 INJECTION, SOLUTION INTRAVENOUS at 07:57

## 2021-01-01 RX ADMIN — SODIUM CHLORIDE, SODIUM GLUCONATE, SODIUM ACETATE, POTASSIUM CHLORIDE, MAGNESIUM CHLORIDE, SODIUM PHOSPHATE, DIBASIC, AND POTASSIUM PHOSPHATE 125 ML/HR: .53; .5; .37; .037; .03; .012; .00082 INJECTION, SOLUTION INTRAVENOUS at 19:42

## 2021-01-01 RX ADMIN — VASOPRESSIN 0.04 UNITS/MIN: 20 INJECTION INTRAVENOUS at 15:38

## 2021-01-01 RX ADMIN — METRONIDAZOLE 500 MG: 500 INJECTION, SOLUTION INTRAVENOUS at 01:13

## 2021-01-01 RX ADMIN — NOREPINEPHRINE BITARTRATE 30 MCG/MIN: 1 INJECTION, SOLUTION, CONCENTRATE INTRAVENOUS at 21:04

## 2021-01-01 RX ADMIN — PIPERACILLIN AND TAZOBACTAM 3.38 G: 3; .375 INJECTION, POWDER, LYOPHILIZED, FOR SOLUTION INTRAVENOUS at 12:15

## 2021-01-01 RX ADMIN — METRONIDAZOLE 500 MG: 500 INJECTION, SOLUTION INTRAVENOUS at 17:17

## 2021-01-01 RX ADMIN — INSULIN GLARGINE 15 UNITS: 100 INJECTION, SOLUTION SUBCUTANEOUS at 21:43

## 2021-01-01 RX ADMIN — VASOPRESSIN 0.04 UNITS/MIN: 20 INJECTION INTRAVENOUS at 19:27

## 2021-01-01 RX ADMIN — METRONIDAZOLE 500 MG: 500 INJECTION, SOLUTION INTRAVENOUS at 10:04

## 2021-01-01 RX ADMIN — CIPROFLOXACIN 400 MG: 2 INJECTION, SOLUTION INTRAVENOUS at 16:05

## 2021-01-01 RX ADMIN — METRONIDAZOLE 500 MG: 500 INJECTION, SOLUTION INTRAVENOUS at 11:11

## 2021-01-01 RX ADMIN — HEPARIN SODIUM 5000 UNITS: 5000 INJECTION INTRAVENOUS; SUBCUTANEOUS at 05:04

## 2021-01-01 RX ADMIN — SODIUM CHLORIDE 500 ML/HR: 0.9 INJECTION, SOLUTION INTRAVENOUS at 13:01

## 2021-01-01 RX ADMIN — DEXAMETHASONE SODIUM PHOSPHATE 40 MG: 100 INJECTION INTRAMUSCULAR; INTRAVENOUS at 13:11

## 2021-01-01 RX ADMIN — INSULIN HUMAN 10 UNITS: 100 INJECTION, SOLUTION PARENTERAL at 12:03

## 2021-01-01 RX ADMIN — HEPARIN SODIUM 5000 UNITS: 5000 INJECTION INTRAVENOUS; SUBCUTANEOUS at 13:37

## 2021-01-01 RX ADMIN — VANCOMYCIN HYDROCHLORIDE 500 MG: 5 INJECTION, POWDER, LYOPHILIZED, FOR SOLUTION INTRAVENOUS at 05:02

## 2021-01-01 RX ADMIN — VANCOMYCIN HYDROCHLORIDE 500 MG: 5 INJECTION, POWDER, LYOPHILIZED, FOR SOLUTION INTRAVENOUS at 13:19

## 2021-01-01 RX ADMIN — HEPARIN SODIUM 5000 UNITS: 5000 INJECTION INTRAVENOUS; SUBCUTANEOUS at 05:01

## 2021-01-01 RX ADMIN — METRONIDAZOLE 500 MG: 500 INJECTION, SOLUTION INTRAVENOUS at 01:36

## 2021-01-01 RX ADMIN — HYDROMORPHONE HYDROCHLORIDE 0.5 MG: 1 INJECTION, SOLUTION INTRAMUSCULAR; INTRAVENOUS; SUBCUTANEOUS at 05:01

## 2021-01-01 RX ADMIN — LIDOCAINE HYDROCHLORIDE 1 ML: 10 INJECTION, SOLUTION INFILTRATION; PERINEURAL at 13:11

## 2021-01-01 RX ADMIN — NOREPINEPHRINE BITARTRATE 4 MCG/MIN: 1 INJECTION, SOLUTION, CONCENTRATE INTRAVENOUS at 13:23

## 2021-01-01 RX ADMIN — VANCOMYCIN HYDROCHLORIDE 500 MG: 5 INJECTION, POWDER, LYOPHILIZED, FOR SOLUTION INTRAVENOUS at 23:37

## 2021-01-01 RX ADMIN — POTASSIUM CHLORIDE 40 MEQ: 29.8 INJECTION, SOLUTION INTRAVENOUS at 07:59

## 2021-01-01 RX ADMIN — SODIUM CHLORIDE 2 UNITS/HR: 9 INJECTION, SOLUTION INTRAVENOUS at 21:30

## 2021-01-01 RX ADMIN — CALCIUM GLUCONATE 1 G: 20 INJECTION, SOLUTION INTRAVENOUS at 00:09

## 2021-01-01 RX ADMIN — ONDANSETRON 4 MG: 2 INJECTION INTRAMUSCULAR; INTRAVENOUS at 05:58

## 2021-01-01 RX ADMIN — CIPROFLOXACIN 400 MG: 2 INJECTION, SOLUTION INTRAVENOUS at 04:09

## 2021-01-01 RX ADMIN — HEPARIN SODIUM 5000 UNITS: 5000 INJECTION INTRAVENOUS; SUBCUTANEOUS at 13:14

## 2021-01-01 RX ADMIN — METRONIDAZOLE 500 MG: 500 INJECTION, SOLUTION INTRAVENOUS at 09:32

## 2021-01-01 RX ADMIN — METRONIDAZOLE 500 MG: 500 INJECTION, SOLUTION INTRAVENOUS at 18:01

## 2021-01-01 RX ADMIN — CIPROFLOXACIN HYDROCHLORIDE 500 MG: 500 TABLET, FILM COATED ORAL at 21:22

## 2021-01-01 RX ADMIN — PANTOPRAZOLE SODIUM 40 MG: 40 INJECTION, POWDER, FOR SOLUTION INTRAVENOUS at 08:00

## 2021-01-01 RX ADMIN — SODIUM CHLORIDE 500 ML/HR: 0.9 INJECTION, SOLUTION INTRAVENOUS at 14:25

## 2021-01-01 RX ADMIN — NOREPINEPHRINE BITARTRATE 35 MCG/MIN: 1 INJECTION, SOLUTION, CONCENTRATE INTRAVENOUS at 16:54

## 2021-01-01 RX ADMIN — INSULIN LISPRO 1 UNITS: 100 INJECTION, SOLUTION INTRAVENOUS; SUBCUTANEOUS at 13:18

## 2021-01-01 RX ADMIN — IOHEXOL 100 ML: 350 INJECTION, SOLUTION INTRAVENOUS at 11:18

## 2021-01-01 RX ADMIN — SODIUM CHLORIDE, SODIUM GLUCONATE, SODIUM ACETATE, POTASSIUM CHLORIDE, MAGNESIUM CHLORIDE, SODIUM PHOSPHATE, DIBASIC, AND POTASSIUM PHOSPHATE 125 ML/HR: .53; .5; .37; .037; .03; .012; .00082 INJECTION, SOLUTION INTRAVENOUS at 03:34

## 2021-01-01 RX ADMIN — METRONIDAZOLE 500 MG: 500 INJECTION, SOLUTION INTRAVENOUS at 01:39

## 2021-01-01 RX ADMIN — SODIUM CHLORIDE 2000 ML/HR: 0.9 INJECTION, SOLUTION INTRAVENOUS at 11:15

## 2021-01-01 RX ADMIN — SODIUM CHLORIDE 4 UNITS/HR: 9 INJECTION, SOLUTION INTRAVENOUS at 09:39

## 2021-01-01 RX ADMIN — CIPROFLOXACIN 400 MG: 2 INJECTION, SOLUTION INTRAVENOUS at 17:39

## 2021-01-01 RX ADMIN — SODIUM CHLORIDE, SODIUM GLUCONATE, SODIUM ACETATE, POTASSIUM CHLORIDE, MAGNESIUM CHLORIDE, SODIUM PHOSPHATE, DIBASIC, AND POTASSIUM PHOSPHATE 1000 ML: .53; .5; .37; .037; .03; .012; .00082 INJECTION, SOLUTION INTRAVENOUS at 15:55

## 2021-01-01 RX ADMIN — PANTOPRAZOLE SODIUM 40 MG: 40 INJECTION, POWDER, FOR SOLUTION INTRAVENOUS at 13:12

## 2021-01-01 RX ADMIN — NOREPINEPHRINE BITARTRATE 24 MCG/MIN: 1 INJECTION, SOLUTION, CONCENTRATE INTRAVENOUS at 04:33

## 2021-01-01 RX ADMIN — HEPARIN SODIUM 5000 UNITS: 5000 INJECTION INTRAVENOUS; SUBCUTANEOUS at 21:22

## 2021-01-01 RX ADMIN — NOREPINEPHRINE BITARTRATE 22 MCG/MIN: 1 INJECTION, SOLUTION, CONCENTRATE INTRAVENOUS at 08:00

## 2021-01-01 RX ADMIN — SODIUM CHLORIDE, SODIUM GLUCONATE, SODIUM ACETATE, POTASSIUM CHLORIDE, MAGNESIUM CHLORIDE, SODIUM PHOSPHATE, DIBASIC, AND POTASSIUM PHOSPHATE 125 ML/HR: .53; .5; .37; .037; .03; .012; .00082 INJECTION, SOLUTION INTRAVENOUS at 12:30

## 2021-01-01 RX ADMIN — VANCOMYCIN HYDROCHLORIDE 500 MG: 5 INJECTION, POWDER, LYOPHILIZED, FOR SOLUTION INTRAVENOUS at 05:31

## 2021-01-01 RX ADMIN — METRONIDAZOLE 500 MG: 500 TABLET ORAL at 13:36

## 2021-01-01 RX ADMIN — PANTOPRAZOLE SODIUM 40 MG: 40 TABLET, DELAYED RELEASE ORAL at 05:04

## 2021-01-01 RX ADMIN — HEPARIN SODIUM 5000 UNITS: 5000 INJECTION INTRAVENOUS; SUBCUTANEOUS at 21:42

## 2021-01-01 RX ADMIN — Medication 1000 UNITS: at 09:32

## 2021-01-01 RX ADMIN — SODIUM CHLORIDE, SODIUM GLUCONATE, SODIUM ACETATE, POTASSIUM CHLORIDE, MAGNESIUM CHLORIDE, SODIUM PHOSPHATE, DIBASIC, AND POTASSIUM PHOSPHATE 250 ML/HR: .53; .5; .37; .037; .03; .012; .00082 INJECTION, SOLUTION INTRAVENOUS at 21:18

## 2021-01-01 RX ADMIN — INSULIN GLARGINE 15 UNITS: 100 INJECTION, SOLUTION SUBCUTANEOUS at 21:23

## 2021-01-01 RX ADMIN — PANTOPRAZOLE SODIUM 40 MG: 40 TABLET, DELAYED RELEASE ORAL at 05:13

## 2021-01-01 RX ADMIN — NOREPINEPHRINE BITARTRATE 26 MCG/MIN: 1 INJECTION, SOLUTION, CONCENTRATE INTRAVENOUS at 01:52

## 2021-01-01 RX ADMIN — HEPARIN SODIUM 5000 UNITS: 5000 INJECTION INTRAVENOUS; SUBCUTANEOUS at 05:23

## 2021-01-01 RX ADMIN — SODIUM CHLORIDE, SODIUM GLUCONATE, SODIUM ACETATE, POTASSIUM CHLORIDE, MAGNESIUM CHLORIDE, SODIUM PHOSPHATE, DIBASIC, AND POTASSIUM PHOSPHATE 125 ML/HR: .53; .5; .37; .037; .03; .012; .00082 INJECTION, SOLUTION INTRAVENOUS at 21:35

## 2021-01-01 RX ADMIN — VANCOMYCIN HYDROCHLORIDE 1500 MG: 1 INJECTION, POWDER, LYOPHILIZED, FOR SOLUTION INTRAVENOUS at 15:57

## 2021-01-01 RX ADMIN — HEPARIN SODIUM 5000 UNITS: 5000 INJECTION INTRAVENOUS; SUBCUTANEOUS at 05:31

## 2021-01-01 RX ADMIN — NOREPINEPHRINE BITARTRATE 5 MCG/MIN: 1 INJECTION, SOLUTION, CONCENTRATE INTRAVENOUS at 13:17

## 2021-01-01 RX ADMIN — Medication 1000 UNITS: at 08:09

## 2021-01-01 RX ADMIN — INSULIN GLARGINE 15 UNITS: 100 INJECTION, SOLUTION SUBCUTANEOUS at 22:01

## 2021-01-01 RX ADMIN — CIPROFLOXACIN HYDROCHLORIDE 500 MG: 500 TABLET, FILM COATED ORAL at 09:12

## 2021-01-01 RX ADMIN — ACETAMINOPHEN 650 MG: 325 TABLET, FILM COATED ORAL at 19:11

## 2021-01-01 RX ADMIN — ONDANSETRON 4 MG: 2 INJECTION INTRAMUSCULAR; INTRAVENOUS at 15:48

## 2021-01-01 RX ADMIN — HEPARIN SODIUM 5000 UNITS: 5000 INJECTION INTRAVENOUS; SUBCUTANEOUS at 05:11

## 2021-01-01 RX ADMIN — METRONIDAZOLE 500 MG: 500 INJECTION, SOLUTION INTRAVENOUS at 01:18

## 2021-01-01 RX ADMIN — CIPROFLOXACIN 400 MG: 2 INJECTION, SOLUTION INTRAVENOUS at 16:43

## 2021-01-01 RX ADMIN — Medication 1000 UNITS: at 08:33

## 2021-01-01 RX ADMIN — HEPARIN SODIUM 5000 UNITS: 5000 INJECTION INTRAVENOUS; SUBCUTANEOUS at 21:04

## 2021-01-01 RX ADMIN — Medication 1000 UNITS: at 09:12

## 2021-01-01 RX ADMIN — VASOPRESSIN 0.04 UNITS/MIN: 20 INJECTION INTRAVENOUS at 04:42

## 2021-01-01 RX ADMIN — SODIUM CHLORIDE 10 UNITS/HR: 9 INJECTION, SOLUTION INTRAVENOUS at 13:48

## 2021-01-01 RX ADMIN — HEPARIN SODIUM 5000 UNITS: 5000 INJECTION INTRAVENOUS; SUBCUTANEOUS at 13:16

## 2021-01-01 RX ADMIN — SODIUM CHLORIDE, SODIUM GLUCONATE, SODIUM ACETATE, POTASSIUM CHLORIDE, MAGNESIUM CHLORIDE, SODIUM PHOSPHATE, DIBASIC, AND POTASSIUM PHOSPHATE 250 ML/HR: .53; .5; .37; .037; .03; .012; .00082 INJECTION, SOLUTION INTRAVENOUS at 17:14

## 2021-01-01 RX ADMIN — VANCOMYCIN HYDROCHLORIDE 500 MG: 5 INJECTION, POWDER, LYOPHILIZED, FOR SOLUTION INTRAVENOUS at 18:13

## 2021-01-01 RX ADMIN — Medication 1000 UNITS: at 09:04

## 2021-01-01 RX ADMIN — SODIUM CHLORIDE, SODIUM GLUCONATE, SODIUM ACETATE, POTASSIUM CHLORIDE, MAGNESIUM CHLORIDE, SODIUM PHOSPHATE, DIBASIC, AND POTASSIUM PHOSPHATE 125 ML/HR: .53; .5; .37; .037; .03; .012; .00082 INJECTION, SOLUTION INTRAVENOUS at 03:17

## 2021-01-01 RX ADMIN — INSULIN LISPRO 1 UNITS: 100 INJECTION, SOLUTION INTRAVENOUS; SUBCUTANEOUS at 08:34

## 2021-01-01 RX ADMIN — METRONIDAZOLE 500 MG: 500 TABLET ORAL at 15:00

## 2021-01-01 RX ADMIN — HEPARIN SODIUM 5000 UNITS: 5000 INJECTION INTRAVENOUS; SUBCUTANEOUS at 21:25

## 2021-01-01 RX ADMIN — SODIUM BICARBONATE 50 MEQ: 84 INJECTION INTRAVENOUS at 15:48

## 2021-01-01 RX ADMIN — CIPROFLOXACIN 400 MG: 2 INJECTION, SOLUTION INTRAVENOUS at 16:41

## 2021-01-01 RX ADMIN — CIPROFLOXACIN 400 MG: 2 INJECTION, SOLUTION INTRAVENOUS at 05:11

## 2021-01-01 RX ADMIN — CIPROFLOXACIN 400 MG: 2 INJECTION, SOLUTION INTRAVENOUS at 03:57

## 2021-01-01 RX ADMIN — CIPROFLOXACIN 400 MG: 2 INJECTION, SOLUTION INTRAVENOUS at 05:13

## 2021-01-01 RX ADMIN — POTASSIUM CHLORIDE 40 MEQ: 29.8 INJECTION, SOLUTION INTRAVENOUS at 04:14

## 2021-01-01 RX ADMIN — SODIUM CHLORIDE, SODIUM GLUCONATE, SODIUM ACETATE, POTASSIUM CHLORIDE, MAGNESIUM CHLORIDE, SODIUM PHOSPHATE, DIBASIC, AND POTASSIUM PHOSPHATE 125 ML/HR: .53; .5; .37; .037; .03; .012; .00082 INJECTION, SOLUTION INTRAVENOUS at 03:14

## 2021-01-01 RX ADMIN — HEPARIN SODIUM 5000 UNITS: 5000 INJECTION INTRAVENOUS; SUBCUTANEOUS at 13:20

## 2021-01-01 RX ADMIN — NOREPINEPHRINE BITARTRATE 27 MCG/MIN: 1 INJECTION, SOLUTION, CONCENTRATE INTRAVENOUS at 23:35

## 2021-01-01 RX ADMIN — PANTOPRAZOLE SODIUM 40 MG: 40 TABLET, DELAYED RELEASE ORAL at 05:11

## 2021-01-01 RX ADMIN — METRONIDAZOLE 500 MG: 500 INJECTION, SOLUTION INTRAVENOUS at 09:22

## 2021-01-01 RX ADMIN — NOREPINEPHRINE BITARTRATE 2 MCG/MIN: 1 INJECTION, SOLUTION, CONCENTRATE INTRAVENOUS at 23:40

## 2021-01-01 RX ADMIN — SODIUM CHLORIDE 2000 ML: 0.9 INJECTION, SOLUTION INTRAVENOUS at 11:47

## 2021-01-01 RX ADMIN — SODIUM CHLORIDE 2 UNITS/HR: 9 INJECTION, SOLUTION INTRAVENOUS at 18:07

## 2021-01-01 RX ADMIN — HEPARIN SODIUM 5000 UNITS: 5000 INJECTION INTRAVENOUS; SUBCUTANEOUS at 21:06

## 2021-01-01 RX ADMIN — CIPROFLOXACIN 400 MG: 2 INJECTION, SOLUTION INTRAVENOUS at 04:13

## 2021-01-01 RX ADMIN — Medication 50 MEQ: at 15:48

## 2021-01-01 RX ADMIN — SODIUM CHLORIDE, SODIUM GLUCONATE, SODIUM ACETATE, POTASSIUM CHLORIDE, MAGNESIUM CHLORIDE, SODIUM PHOSPHATE, DIBASIC, AND POTASSIUM PHOSPHATE 125 ML/HR: .53; .5; .37; .037; .03; .012; .00082 INJECTION, SOLUTION INTRAVENOUS at 19:28

## 2021-01-01 RX ADMIN — NOREPINEPHRINE BITARTRATE 30 MCG/MIN: 1 INJECTION, SOLUTION, CONCENTRATE INTRAVENOUS at 21:35

## 2021-01-01 RX ADMIN — HEPARIN SODIUM 5000 UNITS: 5000 INJECTION INTRAVENOUS; SUBCUTANEOUS at 13:12

## 2021-01-01 RX ADMIN — VANCOMYCIN HYDROCHLORIDE 500 MG: 5 INJECTION, POWDER, LYOPHILIZED, FOR SOLUTION INTRAVENOUS at 17:39

## 2021-01-01 RX ADMIN — VASOPRESSIN 0.04 UNITS/MIN: 20 INJECTION INTRAVENOUS at 09:33

## 2021-01-01 RX ADMIN — METRONIDAZOLE 500 MG: 500 INJECTION, SOLUTION INTRAVENOUS at 17:39

## 2021-06-17 PROBLEM — M25.561 RIGHT KNEE PAIN: Status: ACTIVE | Noted: 2021-01-01

## 2021-06-17 PROBLEM — R55 SYNCOPE AND COLLAPSE: Status: ACTIVE | Noted: 2021-01-01

## 2021-06-17 PROBLEM — R65.21 SEVERE SEPSIS WITH SEPTIC SHOCK (HCC): Status: ACTIVE | Noted: 2021-01-01

## 2021-06-17 PROBLEM — E11.01 HHNC (HYPERGLYCEMIC HYPEROSMOLAR NONKETOTIC COMA) (HCC): Status: ACTIVE | Noted: 2021-01-01

## 2021-06-17 PROBLEM — E83.52 HYPERCALCEMIA: Status: ACTIVE | Noted: 2021-01-01

## 2021-06-17 PROBLEM — E11.9 TYPE 2 DIABETES MELLITUS (HCC): Status: ACTIVE | Noted: 2021-01-01

## 2021-06-17 PROBLEM — K52.9 COLITIS: Status: ACTIVE | Noted: 2021-01-01

## 2021-06-17 PROBLEM — R93.0 ABNORMAL CT SCAN OF HEAD: Status: ACTIVE | Noted: 2021-01-01

## 2021-06-17 PROBLEM — E87.2 LACTIC ACIDOSIS: Status: ACTIVE | Noted: 2021-01-01

## 2021-06-17 PROBLEM — I10 ESSENTIAL HYPERTENSION: Status: ACTIVE | Noted: 2021-01-01

## 2021-06-17 PROBLEM — N17.9 AKI (ACUTE KIDNEY INJURY) (HCC): Status: ACTIVE | Noted: 2021-01-01

## 2021-06-17 PROBLEM — A41.9 SEVERE SEPSIS WITH SEPTIC SHOCK (HCC): Status: ACTIVE | Noted: 2021-01-01

## 2021-06-17 PROBLEM — N30.00 ACUTE CYSTITIS WITHOUT HEMATURIA: Status: ACTIVE | Noted: 2021-01-01

## 2021-06-17 NOTE — ED PROVIDER NOTES
Emergency Department Trauma Note  Rachel Andres 62 y o  male MRN: [id number]  Unit/Bed#: BU95/YE85 Encounter: [id number]      Trauma Alert: Trauma Acuity: Trauma Evaluation  Model of Arrival: Mode of Arrival: ALS via    Trauma Team: Current Providers  Attending Provider: Zeyad Christianson MD  Attending Provider: Armand Pérez DO  Physician Assistant: Mikey Cordova PA-C  Consulting Physician: Olu Cuevas MD  Consultants: None      History of Present Illness     Chief Complaint:   Chief Complaint   Patient presents with    Vomiting     nausea vomiting diarrhea since yesterday  dizziness noted fell yesterday due to same complainining of back pain no blood thinners no loc     HPI:  Rachel Andres is a 62 y o  male who presents with vomiting diarrhea fall neck pain elevated blood sugars  Mechanism:Details of Incident: pt fell due to dizziness complaining of back pain Injury Date: 06/17/21 Injury Time: 0000 Injury Occurence Location - [address] [place] [address] Way: carbon    Patient presents to the emergency department today via [facility] emergency [facility] services  Patient is alert orient x4 providing his own history stating he is insulin-dependent diabetic he last himself insulin last night  Patient states in the early morning hours he began with vomiting and diarrhea he states the vomitus is yellow bilious in nature and diarrhea is watery brown denies black or red contents of either  Patient is noted brown diarrhea on his right leg  He states he became dizzy throughout the night when he went up to get the bathroom fell to the ground and is having posterior neck pain since that point  He denies any other novel pain at this  He does admit to abdominal pain since the onset vomiting and diarrhea  Patient does have an 18 gauge pre-hospital line in place EMS states they gave him 250 cc of fluid on the way and they noted a blood sugar over 500 hypotension      Patient trauma evaluation upon my examination        Review of Systems   Constitutional: Negative for chills and fever  HENT: Negative for ear pain and sore throat  Eyes: Negative for pain and visual disturbance  Respiratory: Negative for cough and shortness of breath  Cardiovascular: Negative for chest pain and palpitations  Gastrointestinal: Positive for abdominal pain, diarrhea and vomiting  Genitourinary: Negative for dysuria and hematuria  Musculoskeletal: Positive for neck pain  Negative for arthralgias and back pain  Skin: Negative for color change and rash  Neurological: Negative for seizures and syncope  All other systems reviewed and are negative  Historical Information     Immunizations:   Immunization History   Administered Date(s) Administered    SARS-CoV-2 / COVID-19 mRNA IM (Moderna) 2021, 2021       Past Medical History:   Diagnosis Date    Allergic rhinitis     Anxiety     Depression     Diabetes ([facility] [address] )     Hypertension     PTSD (post-traumatic stress disorder)     Sleep difficulties        Family History   Problem Relation Age of Onset    Psychiatric Illness Father      Past Surgical History:   Procedure Laterality Date    INGUINAL HERNIA REPAIR      KNEE SURGERY      RETINAL DETACHMENT SURGERY      SHOULDER ARTHROSCOPY       Social History     Tobacco Use    Smoking status: Former Smoker     Packs/day: 0 25     Years: 5 00     Pack years: 1 25     Types: Cigarettes     Quit date: 1988     Years since quittin 7    Smokeless tobacco: Former User     Quit date: 1988    Tobacco comment: patient quit smoking 30 yrs ago   Substance Use Topics    Alcohol use:  Yes     Alcohol/week: 1 0 standard drinks     Types: 1 Cans of beer per week     Comment: socially; monthly    Drug use: No     E-Cigarette/Vaping     E-Cigarette/Vaping Substances       Family History: non-contributory    Meds/Allergies   Prior to Admission Medications   Prescriptions Last Dose Informant Patient Reported? Taking? cholecalciferol 1000 units tablet   No No   Sig: Take 1 tablet (1,000 Units total) by mouth daily Start after finishing Vitamin D2 53166F   cyanocobalamin 1,000 mcg/mL   No No   Sig: Inject 1 mL (1,000 mcg total) into a muscle every 30 (thirty) days   ergocalciferol (VITAMIN D2) 50,000 units   No No   Sig: Take 1 capsule (50,000 Units total) by mouth once a week for 13 doses   insulin glargine (LANTUS) 100 units/mL subcutaneous injection   No No   Sig: Inject 18 Units under the skin daily at bedtime   lisinopril-hydrochlorothiazide (PRINZIDE,ZESTORETIC) 10-12 5 MG per tablet   No No   Sig: Take 1 tablet by mouth daily   loratadine (CLARITIN) 10 mg tablet   Yes No   Sig: Take 10 mg by mouth daily   metFORMIN (GLUCOPHAGE) 500 mg tablet   No No   Sig: Take 1 tablet (500 mg total) by mouth 2 (two) times a day with meals   naproxen (NAPROSYN) 500 mg tablet   No No   Sig: Take 1 tablet (500 mg total) by mouth every 12 (twelve) hours as needed for moderate pain   sertraline (ZOLOFT) 50 mg tablet   No No   Sig: Take 3 tablets (150 mg total) by mouth daily   traZODone (DESYREL) 150 mg tablet   No No   Sig: Take 1 tablet (150 mg total) by mouth daily at bedtime as needed for sleep      Facility-Administered Medications: None       Allergies   Allergen Reactions    Amoxicillin GI Intolerance       PHYSICAL EXAM    PE limited by:  None    Objective   Vitals:   First set: Temperature: (!) 96 7 °F (35 9 °C) (06/17/21 1036)  Pulse: 101 (06/17/21 1043)  Respirations: 18 (06/17/21 1043)  Blood Pressure: (!) 74/48 (06/17/21 1043)  SpO2: 94 % (06/17/21 1043)    Primary Survey:   (A) Airway:  Patent clear talking  (B) Breathing:  Clear to auscultation bilaterally  (C) Circulation: Pulses:   radial  4/4  (D) Disabliity:  GCS Total:  15  (E) Expose:  Completed    Secondary Survey: (Click on Physical Exam tab above)  Physical Exam  Vitals and nursing note reviewed     Constitutional:       General: He is not in acute distress  Appearance: He is well-developed and normal weight  He is not ill-appearing, toxic-appearing or diaphoretic  HENT:      Head: Normocephalic and atraumatic  Right Ear: Tympanic membrane, ear canal and external ear normal       Left Ear: Tympanic membrane, ear canal and external ear normal       Nose: Nose normal  No congestion or rhinorrhea  Mouth/Throat:      Mouth: Mucous membranes are moist       Pharynx: No oropharyngeal exudate or posterior oropharyngeal erythema  Eyes:      Extraocular Movements: Extraocular movements intact  Conjunctiva/sclera: Conjunctivae normal       Pupils: Pupils are equal, round, and reactive to light  Cardiovascular:      Rate and Rhythm: Normal rate and regular rhythm  Heart sounds: No murmur heard  Pulmonary:      Effort: Pulmonary effort is normal  No respiratory distress  Breath sounds: Normal breath sounds  Abdominal:      Palpations: Abdomen is soft  Tenderness: There is abdominal tenderness  Comments: Generalized abdominal tenderness  There is some age contusion of the abdomen potentially related to insulin injections   Musculoskeletal:         General: Normal range of motion  Cervical back: Neck supple  Skin:     General: Skin is warm and dry  Neurological:      General: No focal deficit present  Mental Status: He is alert and oriented to person, place, and time  Psychiatric:         Mood and Affect: Mood normal          Cervical spine cleared by clinical criteria? No (imaging required)      Patient's cervical spine was personally evaluated  There is no evidence of contusion, abrasion, laceration or swelling  No cervical spine tenderness  Patient has full range of motion both actively and passively against resistance without pain elicited  Cervical spine is considered clear      Invasive Devices     Central Venous Catheter Line            CVC Central Lines 06/17/21 Triple Right Femoral <1 day Peripheral Intravenous Line            Peripheral IV Distal;Left;Upper;Ventral (anterior) Arm -- days    Peripheral IV 06/17/21 Right Antecubital <1 day                Lab Results:   Results Reviewed     Procedure Component Value Units Date/Time    Novel Coronavirus (Covid-19),PCR [facility] - 2 Hour Stat [[id number]]  (Normal) Collected: 06/17/21 1120    Lab Status: Final result Specimen: Nares from Nose Updated: 06/17/21 1226     SARS-CoV-2 Negative    Narrative: The specimen collection materials, transport medium, and/or testing methodology utilized in the production of these test results have been proven to be reliable in a limited validation with an abbreviated program under the Emergency Utilization Authorization provided by the FDA  Testing reported as "Presumptive positive" will be confirmed with secondary testing to ensure result accuracy  Clinical caution and judgement should be used with the interpretation of these results with consideration of the clinical impression and other laboratory testing  Testing reported as "Positive" or "Negative" has been proven to be accurate according to standard laboratory validation requirements  All testing is performed with control materials showing appropriate reactivity at standard intervals  Urinalysis with microscopic [[id number]]     Lab Status: No result Specimen: Urine     Urine culture [[id number]]     Lab Status: No result Specimen: Urine, Clean Catch     Lactic acid, plasma [[id number]]  (Abnormal) Collected: 06/17/21 1126    Lab Status: Final result Specimen: Blood from Arm, Right Updated: 06/17/21 1149     LACTIC ACID 5 5 mmol/L     Narrative:      Result may be elevated if tourniquet was used during collection      Lactic acid 2 Hours [[id number]]     Lab Status: No result Specimen: Blood     Blood gas, venous [[id number]]  (Abnormal) Collected: 06/17/21 1126    Lab Status: Final result Specimen: Blood from Arm, Right Updated: 06/17/21 1134     pH, Fan 7 274     pCO2, Fan 46 4 mm Hg      pO2, Fan 36 6 mm Hg      HCO3, Fan 21 0 mmol/L      Base Excess, Fan -5 8 mmol/L      O2 Content, Fan 11 4 ml/dL      O2 HGB, VENOUS 61 0 %     Blood culture - 1st Set [[id number]] Collected: 06/17/21 1120    Lab Status:  In process Specimen: Blood from Arm, Left Updated: 06/17/21 1128    Comprehensive metabolic panel [[id number]]  (Abnormal) Collected: 06/17/21 1046    Lab Status: Final result Specimen: Blood from Arm, Right Updated: 06/17/21 1126     Sodium 130 mmol/L      Potassium 4 0 mmol/L      Chloride 92 mmol/L      CO2 24 mmol/L      ANION GAP 14 mmol/L      BUN 40 mg/dL      Creatinine 3 00 mg/dL      Glucose 786 mg/dL      Calcium 9 5 mg/dL      Corrected Calcium 10 3 mg/dL      AST 26 U/L      ALT 26 U/L      Alkaline Phosphatase 101 U/L      Total Protein 6 4 g/dL      Albumin 3 0 g/dL      Total Bilirubin 1 12 mg/dL      eGFR 22 ml/min/1 73sq m     Narrative:      [place] guidelines for Chronic Kidney Disease (CKD):     Stage 1 with normal or high GFR (GFR > 90 mL/min/1 73 square meters)    Stage 2 Mild CKD (GFR = 60-89 mL/min/1 73 square meters)    Stage 3A Moderate CKD (GFR = 45-59 mL/min/1 73 square meters)    Stage 3B Moderate CKD (GFR = 30-44 mL/min/1 73 square meters)    Stage 4 Severe CKD (GFR = 15-29 mL/min/1 73 square meters)    Stage 5 End Stage CKD (GFR <15 mL/min/1 73 square meters)  Note: GFR calculation is accurate only with a steady state creatinine    Troponin I [[id number]]  (Normal) Collected: 06/17/21 1045    Lab Status: Final result Specimen: Blood from Arm, Right Updated: 06/17/21 1112     Troponin I <0 02 ng/mL     Lipase [[id number]]  (Abnormal) Collected: 06/17/21 1046    Lab Status: Final result Specimen: Blood from Arm, Right Updated: 06/17/21 1109     Lipase 63 u/L     CK [[id number]]  (Normal) Collected: 06/17/21 1046    Lab Status: Final result Specimen: Blood from Arm, Right Updated: 06/17/21 1109     Total CK 53 U/L     Magnesium [[id number]]  (Abnormal) Collected: 06/17/21 1046    Lab Status: Final result Specimen: Blood from Arm, Right Updated: 06/17/21 1109     Magnesium 2 7 mg/dL     Beta Hydroxybutyrate [[id number]]  (Normal) Collected: 06/17/21 1046    Lab Status: Final result Specimen: Blood from Arm, Right Updated: 06/17/21 1058     BETA-HYDROXYBUTYRATE 0 4 mmol/L     CBC [[id number]]  (Normal) Collected: 06/17/21 1045    Lab Status: Final result Specimen: Blood from Arm, Right Updated: 06/17/21 1057     WBC 9 97 Thousand/uL      RBC 5 12 Million/uL      Hemoglobin 15 3 g/dL      Hematocrit 44 6 %      MCV 87 fL      MCH 29 9 pg      MCHC 34 3 g/dL      RDW 13 3 %      Platelets 586 Thousands/uL      MPV 9 7 fL     Blood culture - 2nd set [[id number]] Collected: 06/17/21 1046    Lab Status: In process Specimen: Blood from Arm, Right Updated: 06/17/21 1052    UA (URINE) with reflex to Scope [[id number]]     Lab Status: No result Specimen: Urine                  Imaging Studies:   Direct to CT: No  TRAUMA - CT chest abdomen pelvis w contrast   Final Result by Sharon Rizzo MD (06/17 1207)      1  No evidence of trauma   2  Findings of colitis within the sigmoid and descending colon   3  Cystitis      I personally discussed impression 1-3 with DMITRIY Garcia on 6/17/2021 at 12:00 PM       Workstation performed: [id number]         TRAUMA - CT spine cervical wo contrast   Final Result by Sharon Rizzo MD (06/17 5969)      No cervical spine fracture or traumatic malalignment  Workstation performed: [id number]         TRAUMA - CT head wo contrast   Final Result by Sharon Rizzo MD (06/17 1206)      1  Soft tissue density within the left globe, in the vitreous, may represent a dislocated lens  Additionally, there is a calcified structure adjacent to the left retina, posteriorly in the globe, of uncertain etiology  Recommend ophthalmology    consultation     2   No acute intracranial abnormality      I personally discussed this study  with Parvez Driver on 6/17/2021 at 12:00 PM            Workstation performed: [id number]         XR chest 1 view portable    (Results Pending)         Procedures  ECG 12 Lead Documentation Only    Date/Time: 6/17/2021 11:43 AM  Performed by: Cristobal Thornton PA-C  Authorized by: Cristobal Thornton PA-C     Indications / Diagnosis:  Trauma  ECG reviewed by me, the ED Provider: yes    Patient location:  ED  Previous ECG:     Comparison to cardiac monitor: Yes    Interpretation:     Interpretation: normal    Rate:     ECG rate:  101    ECG rate assessment: tachycardic    Rhythm:     Rhythm: sinus tachycardia    Ectopy:     Ectopy: none    QRS:     QRS axis:  Normal    QRS intervals:  Normal  Conduction:     Conduction: normal    ST segments:     ST segments:  Normal  T waves:     T waves: normal    CriticalCare Time  Performed by: Cristobal Thornton PA-C  Authorized by: Cristobal Thornton PA-C     Critical care provider statement:     Critical care time (minutes):  35    Critical care was necessary to treat or prevent imminent or life-threatening deterioration of the following conditions:  Hepatic failure    Critical care was time spent personally by me on the following activities:  Blood draw for specimens, ordering and performing treatments and interventions, ordering and review of laboratory studies, ordering and review of radiographic studies, re-evaluation of patient's condition, review of old charts, discussions with consultants, examination of patient, evaluation of patient's response to treatment, obtaining history from patient or surrogate and development of treatment plan with patient or surrogate  Comments:      Patient requiring large volumes of crystalloid fluids the setting of hyperglycemia and trauma    Central Line    Date/Time: 6/17/2021 1:05 PM  Performed by: Cristobal Thornton PA-C  Authorized by: Cristobal Thornton PA-C     Patient location: Bedside  Other Assisting Provider: Yes (comment) Sonny Morris RN)    Consent:     Consent obtained:  Verbal    Consent given by:  Patient    Risks discussed:  Arterial puncture, bleeding, infection, incorrect placement and nerve damage    Alternatives discussed:  No treatment  Universal protocol:     Procedure explained and questions answered to patient or proxy's satisfaction: yes      Radiology Images displayed and confirmed  If images not available, report reviewed: yes      Patient identity confirmed:  Verbally with patient and arm band  Pre-procedure details:     Hand hygiene: Hand hygiene performed prior to insertion      Sterile barrier technique: All elements of maximal sterile technique followed      Skin preparation:  2% chlorhexidine    Skin preparation agent: Skin preparation agent completely dried prior to procedure    Indications:     Central line indications: medications requiring central line    Anesthesia (see MAR for exact dosages): Anesthesia method:  Local infiltration    Local anesthetic:  Lidocaine 1% w/o epi  Procedure details:     Location:  Right femoral    Vessel type: vein      Laterality:  Right    Approach: percutaneous technique used      Patient position:  Flat    Catheter type:  Triple lumen 20cm    Catheter size:  7 Fr    Landmarks identified: yes      Ultrasound guidance: yes      Sterile ultrasound techniques: Sterile gel and sterile probe covers were used      Manometry confirmation: yes      Number of attempts:  1    Successful placement: yes    Post-procedure details:     Post-procedure:  Dressing applied and line sutured    Assessment:  Blood return through all ports    Post-procedure complications: none      Patient tolerance of procedure:   Tolerated well, no immediate complications             ED Course  ED Course as of Jun 17 1308   Thu Jun 17, 2021   1102 BETA-HYDROXYBUTYRATE: 0 4   1102 WBC: 9 97   1102 Hemoglobin: 15 3   1109 Lipase(!): 63   1110 Magnesium(!): 2 7   1110 Total CK: 53   1132 Corrected sodium for sugar is 141      1156 CT findings consistent with colitis as well as cystitis  C-spine normal   No traumatic head findings however he does have density in the left eye possibly dislocated lens as well as a calcification the globe in the red not near the macula  Patient states that he has had multiple surgeries on the eye       1202 I had a conversation with the patient regarding ocular past   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 is and is aware of the CT findings that I described to him today  No novel eye complaints  1204 Patient is attempting to urinate now  Blood pressure has risen slightly into the 80s  He is mentating fine at this point starting 3rd L of fluid  1210 IMPRESSION:     1  No evidence of trauma  2  Findings of colitis within the sigmoid and descending colon  3  Cystitis      1210 IMPRESSION:     1  Soft tissue density within the left globe, in the vitreous, may represent a dislocated lens  Additionally, there is a calcified structure adjacent to the left retina, posteriorly in the globe, of uncertain etiology  Recommend ophthalmology   consultation  2   No acute intracranial abnormality      1210 IMPRESSION:     No cervical spine fracture or traumatic malalignment          1211   I personally removed patient cervical collar and evaluated neck  Patient medically cleared for behavioral health evaluation           MDM        Disposition  [facility] Transfer: No  Final diagnoses:   Acute kidney injury ([address] )   Lactic acidosis   Hyperglycemia   Fall   Colitis   Cystitis   Hypotension     Time reflects when diagnosis was documented in both MDM as applicable and the Disposition within this note     Time User Action Codes Description Comment    6/17/2021 11:39 AM Rana Mu D Add [N17 9] Acute kidney injury ([address] )     6/17/2021 11:51 AM Rana Mu D Add [E87 2] Lactic acidosis     6/17/2021 11:51 AM Tucker Monsivais Add [R73 9] Hyperglycemia     6/17/2021 11:51 AM [place] Amas D Add [S64  XXXA] Fall     6/17/2021 12:00 PM Shannon Chavez Add [K52 9] Colitis     6/17/2021 12:00 PM Shannon Chavez Add [N30 90] Cystitis     6/17/2021 12:05 PM Jake Amas D Add [I95 9] Hypotension     6/17/2021 12:28 PM Alma Delia Olivo Add [A41 9,  R65 21] Severe sepsis with septic shock [facility])       ED Disposition     ED Disposition Condition Date/Time Comment    Admit Stable Thu Jun 17, 2021  1:04 PM Case was discussed with Dr John Delgado and the patient's admission status was agreed to be Admission Status: inpatient status to the service of Dr Jhon Delgado   Follow-up Information    None       Patient's Medications   Discharge Prescriptions    No medications on file     No discharge procedures on file      PDMP Review     None          ED Provider  Electronically Signed by         Marley Mario PA-C  06/17/21 7071

## 2021-06-17 NOTE — PROGRESS NOTES
Vancomycin Assessment    Ryenaldo Harrison is a 62 y o  male who is currently ordered vancomycin 750mg IV q24h for septic shock  Relevant clinical data and objective history reviewed:  Creatinine   Date Value Ref Range Status   06/17/2021 3 00 (H) 0 60 - 1 30 mg/dL Final     Comment:     Standardized to IDMS reference method   09/18/2018 0 94 0 70 - 1 30 mg/dL Final     Comment:     Standardized to IDMS reference method   11/30/2016 0 79 0 60 - 1 30 mg/dL Final     Comment:     Standardized to IDMS reference method     BP (!) 79/53 (BP Location: Left arm)   Pulse (!) 106   Temp (!) 96 7 °F (35 9 °C) (Temporal)   Resp (!) 27   Wt 85 kg (187 lb 6 3 oz)   SpO2 98%   BMI 30 25 kg/m²   No intake/output data recorded  Lab Results   Component Value Date/Time    BUN 40 (H) 06/17/2021 10:46 AM    WBC 9 97 06/17/2021 10:45 AM    HGB 15 3 06/17/2021 10:45 AM    HCT 44 6 06/17/2021 10:45 AM    MCV 87 06/17/2021 10:45 AM     06/17/2021 10:45 AM     Temp Readings from Last 3 Encounters:   06/17/21 (!) 96 7 °F (35 9 °C) (Temporal)   10/01/18 98 3 °F (36 8 °C) (Tympanic)   09/18/18 98 2 °F (36 8 °C) (Temporal)     Vancomycin Days of Therapy: 1    Assessment/Plan  The patient is currently ordered vancomycin utilizing scheduled dosing based on actual body weight  Baseline risks associated with therapy include: pre-existing renal impairment, concomitant nephrotoxic medications, advanced age, and dehydration  The patient is currently ordered 750mg IV q24h and after clinical evaluation will be changed to 1500mg IV q24h  Pharmacy will also follow closely for s/sx of nephrotoxicity, infusion reactions, and appropriateness of therapy  BMP and CBC will be ordered per protocol  Plan for trough as patient approaches steady state, prior to the 4th  dose at approximately 1430 on 6/20/21  Due to infection severity, will target a trough of 15-20 (appropriate for most indications)     Pharmacy will continue to follow the patients culture results and clinical progress daily      Pearduyha Body, Pharmacist

## 2021-06-17 NOTE — EMTALA/ACUTE CARE TRANSFER
[facility] INTENSIVE CARE UNIT  [address] [facility] [address]  Dept: [phone]      ACUTE CARE TRANSFER CONSENT    NAME Venessa Ovalle                                         1960                              MRN [id number]    I have been informed of my rights regarding examination, treatment, and transfer   by Dr Tiara Clements,     Benefits: For a higher level of ICU care    Risks: Transportation risks       Consent for Transfer:  I acknowledge that my medical condition has been evaluated and explained to me by the treating physician or other qualified medical person and/or my attending physician, who has recommended that I be transferred to the service of Dr Cecile Yepez (ICU Attending Physician) at [facility]  The above potential benefits of such transfer, the potential risks associated with such transfer, and the probable risks of not being transferred have been explained to me, and I fully understand them  The doctor has explained that, in my case, the benefits of transfer outweigh the risks  I agree to be transferred  I authorize the performance of emergency medical procedures and treatments upon me in both transit and upon arrival at the receiving facility  Additionally, I authorize the release of any and all medical records to the receiving facility and request they be transported with me, if possible  I understand that the safest mode of transportation during a medical emergency is an ambulance and that the Hospital advocates the use of this mode of transport  Risks of traveling to the receiving facility by car, including absence of medical control, life sustaining equipment, such as oxygen, and medical personnel has been explained to me and I fully understand them  (MAGALI CORRECT BOX BELOW)  [  ]  I consent to the stated transfer and to be transported by ambulance/helicopter    [  ]  I consent to the stated transfer, but refuse transportation by ambulance and accept full responsibility for my transportation by car  I understand the risks of non-ambulance transfers and I exonerate the Hospital and its staff from any deterioration in my condition that results from this refusal     X___________________________________________    DATE  21  TIME________  Signature of patient or legally responsible individual signing on patient behalf           RELATIONSHIP TO PATIENT_________________________          Provider Certification    NAME Jesus Moreland                                         1960                              MRN [id number]    A medical screening exam was performed on the above named patient  Based on the examination:    Condition Necessitating Transfer: Severe sepsis with septic shock requiring a higher level of ICU care    Patient Condition: Critical    Reason for Transfer: Severe sepsis with septic shock requiring a higher level of ICU care    Transfer Requirements: Facility-[address]  · Space available and qualified personnel available for treatment as acknowledged by    · Agreed to accept transfer and to provide appropriate medical treatment as acknowledged by          · Appropriate medical records of the examination and treatment of the patient are provided at the time of transfer   [address], [address] _______  · Transfer will be performed by qualified personnel from    and appropriate transfer equipment as required, including the use of necessary and appropriate life support measures      Provider Certification: I have examined the patient and explained the following risks and benefits of being transferred/refusing transfer to the patient/family:         Based on these reasonable risks and benefits to the patient and/or the unborn child(vanessa), and based upon the information available at the time of the patients examination, I certify that the medical benefits reasonably to be expected from the provision of appropriate medical treatments at another medical facility outweigh the increasing risks, if any, to the individuals medical condition, and in the case of labor to the unborn child, from effecting the transfer      X____________________________________________ DATE 06/17/21        TIME_______      ORIGINAL - SEND TO MEDICAL RECORDS   COPY - SEND WITH PATIENT DURING TRANSFER

## 2021-06-17 NOTE — ASSESSMENT & PLAN NOTE
· Currently with hypotension secondary to septic shock  · Hold all hypertension medications at this time  · Follow the blood pressure trend closely in the ICU

## 2021-06-17 NOTE — CONSULTS
Consultation - General Surgery   Freddie Larios 62 y o  male MRN: [id number]  Unit/Bed#:  Encounter: [id number]    Assessment/Plan     Assessment:  62 P p/w septic shock POA and CT scan showing colitis and cystitis  Afebrile, HR 110s-120s, MAPs 50s-60s on levophed @35, Vasopressin 0 04, 99% on room air  Abdomen soft, mild diffuse tenderness, no pain out of proportion to examination, mildly distended with tympany  Voluntary guarding noted, no rebound  ABG 7 274, 46 4, 36 6, 21, -5 8    Cr 2 35 following IVF administration, 3 0 on admission  , 500, 789  WBC 9 97, differential pending   Hb 15 3  plt 200    troponin not elevated     Lactic Acid 6 4 from 5 5     UA showing ketosis and glycemic load, no UTI    Blood Cx in process     CT scan revealing cystitis with evident bladder wall thickening, focal colitis of descending colon, no evidence of perforation or abscess, no evident free fluid, no pancolitis    Plan:    -concerning abdominal examination, resuscitation in progress, consider evaluation with possible diagnostic laparoscopy if worsening endpoints of resuscitation   -follow-up Cbc differential, would favor intervention if concerning findings such as bandemia etc  -discussed with critical care medicine, decision made to transfer patient to [facility] prior to surgical evaluation, transfer process already in progress  -serial abdominal examinations   -Continue aggressive ICU resuscitation, wean vasopressors as able  -agree with broad-spectrum antibiotic coverage, presently on cefepime/vancomycin/Flagyl  -Follow-up results of C diff study, concern for possible C diff colitis in setting of sepsis and colitis  -Follow-up stool studies  -Continue insulin infusion for glycemic control      History of Present Illness   History, ROS and PFSH unobtainable from any source due to non    HPI:  Freddie Larios is a 62 y o  male who presents with acute onset diffuse abdominal pain accompanied by nausea, vomiting, and multiple diarrheal bowel movements over the last 24 hours  He denies recent sick contacts, dietary changes, or exposures  He has no recent history of medical intervention or antibiotic administration  He has had a prior inguinal hernia repair, but has no intra-abdominal surgical history, he also has no history of C diff colitis that he is aware of  At present, the patient is awake and mentating though hypotensive, he is able to relate in detail his history as well as current symptoms to me  He reports that his abdominal pain has decreased since his arrival to the hospital though is still present, he still describes it as diffuse without clear localization  The patient also denies dysuria, urinary frequency, or any recent signs or symptoms of UTI  Inpatient consult to Acute Care Surgery     Performed by  Myrlene Crigler, MD     Authorized by Swati Velazquez DO              Review of Systems   Constitutional: Negative for chills and fever  Gastrointestinal: Positive for abdominal distention, abdominal pain, diarrhea, nausea and vomiting  Genitourinary: Negative for difficulty urinating, dysuria, flank pain and frequency  Musculoskeletal: Positive for back pain  All other systems reviewed and are negative        Historical Information   Past Medical History:   Diagnosis Date    Allergic rhinitis     Anxiety     Depression     Diabetes ([address] )     Hypertension     PTSD (post-traumatic stress disorder)     Sleep difficulties      Past Surgical History:   Procedure Laterality Date    INGUINAL HERNIA REPAIR      KNEE SURGERY      RETINAL DETACHMENT SURGERY      SHOULDER ARTHROSCOPY       Social History   Social History     Substance and Sexual Activity   Alcohol Use Not Currently     Social History     Substance and Sexual Activity   Drug Use No     E-Cigarette/Vaping     E-Cigarette/Vaping Substances     Social History     Tobacco Use   Smoking Status Former Smoker    Packs/day: 0 25    Years: 5 00    Pack years: 1 25    Types: Cigarettes    Quit date: 1988    Years since quittin 7   Smokeless Tobacco Former User    Quit date: 1988   Tobacco Comment    patient quit smoking 30 yrs ago     Family History:   Family History   Problem Relation Age of Onset    Psychiatric Illness Father        Meds/Allergies   current meds:   Current Facility-Administered Medications   Medication Dose Route Frequency    cefepime (MAXIPIME) IVPB (premix in dextrose) 2,000 mg 50 mL  2,000 mg Intravenous Q12H    [START ON 2021] cholecalciferol (VITAMIN D3) tablet 1,000 Units  1,000 Units Oral Daily    dextrose 50 % IV solution 25 mL  25 mL Intravenous Daily PRN    heparin (porcine) subcutaneous injection 5,000 Units  5,000 Units Subcutaneous Q8H [address]    insulin regular (HumuLIN R,NovoLIN R) 1 Units/mL in sodium chloride 0 9 % 100 mL infusion  0 3-21 Units/hr Intravenous Titrated    metroNIDAZOLE (FLAGYL) IVPB (premix) 500 mg 100 mL  500 mg Intravenous Q8H    [START ON 2021] multi-electrolyte (ISOLYTE-S PH 7 4 equivalent) IV solution  125 mL/hr Intravenous Continuous    norepinephrine (LEVOPHED) 4 mg (STANDARD CONCENTRATION) IV in sodium chloride 0 9% 250 mL  5 mcg/min Intravenous Titrated    ondansetron (ZOFRAN) injection 4 mg  4 mg Intravenous Q6H PRN    sodium chloride (PF) 0 9 % injection 3 mL  3 mL Intravenous Q1H PRN    sodium chloride 0 9 % infusion  500 mL/hr Intravenous Continuous    Followed by    sodium chloride 0 9 % infusion  250 mL/hr Intravenous Continuous    vancomycin (VANCOCIN) 1,500 mg in sodium chloride 0 9 % 500 mL IVPB  20 mg/kg (Adjusted) Intravenous Q24H    vasopressin (PITRESSIN) 20 Units in sodium chloride 0 9 % 100 mL infusion  0 04 Units/min Intravenous Continuous     Allergies   Allergen Reactions    Amoxicillin GI Intolerance       Objective   First Vitals:   Blood Pressure: (!) 74/48 (21 1043)  Pulse: 101 (06/17/21 1043)  Temperature: (!) 96 7 °F (35 9 °C) (06/17/21 1036)  Temp Source: Temporal (06/17/21 1036)  Respirations: 18 (06/17/21 1043)  Height: 5' 6" (167 6 cm) (06/17/21 1430)  Weight - Scale: 85 kg (187 lb 6 3 oz) (06/17/21 1309)  SpO2: 94 % (06/17/21 1043)    Current Vitals:   Blood Pressure: (!) 65/45 (06/17/21 1431)  Pulse: (!) 115 (06/17/21 1431)  Temperature: (!) 96 7 °F (35 9 °C) (06/17/21 1043)  Temp Source: Temporal (06/17/21 1043)  Respirations: (!) 31 (06/17/21 1431)  Height: 5' 6" (167 6 cm) (06/17/21 1430)  Weight - Scale: 70 1 kg (154 lb 8 7 oz) (06/17/21 1430)  SpO2: 97 % (06/17/21 1431)      Intake/Output Summary (Last 24 hours) at 6/17/2021 1540  Last data filed at 6/17/2021 1424  Gross per 24 hour   Intake 4043 67 ml   Output --   Net 4043 67 ml       Invasive Devices     Central Venous Catheter Line            CVC Central Lines 06/17/21 Triple Right Femoral <1 day          Peripheral Intravenous Line            Peripheral IV Distal;Left;Upper;Ventral (anterior) Arm -- days    Peripheral IV 06/17/21 Right Antecubital <1 day          Drain            Urethral Catheter Non-latex; Double-lumen 16 Fr  <1 day                Physical Exam  Constitutional:       Appearance: He is ill-appearing  HENT:      Mouth/Throat:      Mouth: Mucous membranes are dry  Eyes:      General: No scleral icterus  Pupils: Pupils are equal, round, and reactive to light  Cardiovascular:      Rate and Rhythm: Regular rhythm  Tachycardia present  Pulmonary:      Effort: Pulmonary effort is normal  No respiratory distress  Abdominal:      General: Bowel sounds are increased  There is distension  Palpations: Abdomen is soft  Tenderness: There is generalized abdominal tenderness  There is guarding  There is no right CVA tenderness, left CVA tenderness or rebound  Negative signs include Mcgregor's sign and McBurney's sign  Hernia: No hernia is present        Comments: Voluntary guarding, diffuse mild tenderness, > on left side    No rebound    Musculoskeletal:         General: No swelling or deformity  Skin:     General: Skin is warm and dry  Capillary Refill: Capillary refill takes 2 to 3 seconds  Coloration: Skin is not jaundiced  Findings: No erythema or rash  Neurological:      Mental Status: He is alert and oriented to person, place, and time  Lab Results:   I have personally reviewed pertinent lab results  , CBC:   Lab Results   Component Value Date    WBC 9 97 06/17/2021    HGB 15 3 06/17/2021    HCT 44 6 06/17/2021    MCV 87 06/17/2021     06/17/2021    MCH 29 9 06/17/2021    MCHC 34 3 06/17/2021    RDW 13 3 06/17/2021    MPV 9 7 06/17/2021   , CMP:   Lab Results   Component Value Date    SODIUM 137 06/17/2021    K 3 9 06/17/2021     06/17/2021    CO2 22 06/17/2021    BUN 34 (H) 06/17/2021    CREATININE 2 35 (H) 06/17/2021    CALCIUM 8 5 06/17/2021    AST 26 06/17/2021    ALT 26 06/17/2021    ALKPHOS 101 06/17/2021    EGFR 29 06/17/2021   , Coagulation: No results found for: PT, INR, APTT, Urinalysis:   Lab Results   Component Value Date    COLORU Yellow 06/17/2021    CLARITYU Clear 06/17/2021    SPECGRAV <=1 005 06/17/2021    PHUR 5 5 06/17/2021    LEUKOCYTESUR (A) 06/17/2021     Elevated glucose may cause decreased leukocyte values  See urine microscopic for [facility] result/    NITRITE Negative 06/17/2021    GLUCOSEU >=1000 (1%) (A) 06/17/2021    KETONESU Negative 06/17/2021    BILIRUBINUR Negative 06/17/2021    BLOODU Trace-Intact (A) 06/17/2021   , Amylase: No results found for: AMYLASE, Lipase:   Lab Results   Component Value Date    LIPASE 63 (L) 06/17/2021     Imaging: I have personally reviewed pertinent reports  and I have personally reviewed pertinent films in PACS  EKG, Pathology, and Other Studies: I have personally reviewed pertinent reports     and I have personally reviewed pertinent films in PACS     TRAUMA - CT head wo contrast    Result Date: 6/17/2021  Impression: 1  Soft tissue density within the left globe, in the vitreous, may represent a dislocated lens  Additionally, there is a calcified structure adjacent to the left retina, posteriorly in the globe, of uncertain etiology  Recommend ophthalmology consultation  2   No acute intracranial abnormality I personally discussed this study  with Chao Garces on 6/17/2021 at 12:00 PM Workstation performed: [id number]     TRAUMA - CT spine cervical wo contrast    Result Date: 6/17/2021  Impression: No cervical spine fracture or traumatic malalignment  Workstation performed: [id number]     TRAUMA - CT chest abdomen pelvis w contrast    Result Date: 6/17/2021  Impression: 1  No evidence of trauma 2  Findings of colitis within the sigmoid and descending colon 3    Cystitis I personally discussed impression 1-3 with DMITRIY GEORGE on 6/17/2021 at 12:00 PM  Workstation performed: [id number]

## 2021-06-17 NOTE — ASSESSMENT & PLAN NOTE
· Outpatient follow-up with Ophthalmology    CT scan of the head without contrast (06/17/2021): IMPRESSION:     1  Soft tissue density within the left globe, in the vitreous, may represent a dislocated lens  Additionally, there is a calcified structure adjacent to the left retina, posteriorly in the globe, of uncertain etiology  Recommend ophthalmology   consultation    2   No acute intracranial abnormality

## 2021-06-17 NOTE — CONSULTS
Critical Care Medicine-Consultation  Royce Bull 62 y o  male MRN: [id number]  Unit/Bed#:  Encounter: [id number]      Assessment/Plan:    Impression:  · Septic shock   · gastroenteritis/ colitis/ cystitis related, [place] before symptoms started  · Sigmoid/ descending colitis   · evaluated by surgery, does not appear to be ischemic colitis  ·  likely infectious related, viral versus bacterial, salmonella/shigella, anaerobes,  C diff, no recent intake of antibiotics  · Acute kidney injury- creatinine 3 0 on presentation, down to 2 35  · cystitis  · vomiting/ diarrhea   · Lactic acidosis  · Dizziness/ fall   ·  underwent a pan scan, no acute abnormalities   · CT head showed  Possible dislocated left eye lens  · Hx HTN  · Diabetes type 2 hyperglycemia /nonketotic  · Anxiety/depression    Plan:    Neuro:     No acute issues  Holding PTA Zoloft/trazodone   consider opthalmology consult for possible left eye lens dislocation    Cardiovascular:    Now on norepinephrine drip, Required high amount earlier, needed a 2nd pressor vasopressin at 0 04 for about 2 hours  Nw titrated down to only norepinephrine  Titrate for map above 65  Trend lactic acid    Pulmonary:   No acute issues    Gastrointestinal:     Place NG tube due to recurrent vomiting      Remain NPO   if no improvement with current antibiotics/resuscitation therapy, will likely require GI consult for colonoscopy   appreciate general surgery inputs    Renal:   Fluid resuscitation currently at 125/hr a m  isolyte    ID:     initially Given Zosyn, and then  Started oncefepime/ IV vancomycin/ metronidazole    due to persistent vomiting/ diarrhea, and worsening shock state, added oral vancomycin 500 q 6 for C diff coverage   Discussed with Infectious Disease, possible animal products intake poisoning, switched from cefepime to ciprofloxacin   Now on:  Ciprofloxacin  400 mg q 12, metronidazole/ oral vancomycin, discontinue IV vancomycin  Follow blood cultures    Stool cultures/leukocytes/ over/parasites /GERD a   Stool enteric bacterial panel by PCR    C diff PCR    Heme:   No acute issues, heparin subcu for DVT prophylaxis    Endocrine:   Insulin drip and Accu-Chek q 2 hours  Yes      No      Comments            x  Feeding               x  Analgesia               x  Sedation            x     Thrombosis prevention           x     Head of bed up >30degrees               x  Ulcer prophylaxis           x     Glucose control           x     Oral care                 Code Status    Level 1 - Full Code                              x  Family updated today    Has 2 brothers, Mariya Grant and Bethany Blount, no POA, no spouse  Stephie phone number [phone]                           Disposition:   Continue Critical Care, pt will be transferred to [facility] given the critical care status, need for multiple pressors and in house critical care coverage      I have personally seen and examined the patient on 6/17/2021  I discussed the patient with the AP /House staff including, but not limited to, verifying findings; reviewing labs and imaging; discussing with consultants; developing the plan of care with the bedside nurse; and discussing treatment plan with patient or surrogate  I have reviewed the note and assessment performed by the AP /House staff, and agree with the documented findings and plan of care with the following additions/exceptions  Please see my following comments for details and adjustments  Critical care time, excluding procedures, teaching, family meetings, and excludes any prior time recorded by providers other than myself, 61 minutes  Francesco Toth MD      History of Present Illness   Physician Requesting Consult: Helen Mckinnon DO    Reason for Consult / Principal Problem:  Septic shock     60-year-old male with a history of HTN, DM2, and anxiety/ depression, allergic rhinitis       presented to the ED in this a m  with vomiting/ diarrhea also had a fall earlier today after having dizziness  No hematemesis or melena or hematochezia  He became dizzy after multiple episodes of vomiting and then further  He was found to be hypotensive with lactic acidosis of 5 5  After fluid resuscitation with about 5 L of normal saline, he was started on norepinephrine drip, central line was placed  CT chest abdomen and pelvis with contrast showed evidence of colitis at the sigmoid/ until descending colon  As well as cystitis  He was started on antibiotic, initially given Zosyn x1, then switched to vancomycin/cefepime /metronidazole  On my evaluation, patient states that his abdominal pain nontenderness somewhat improving  Symptoms started yesterday, admits to eating outside at the [place], ate [employer]  After that started to have nausea, vomiting multiple times, non bloody,  Non bilious  He was not able to have any PO intake after that  Also with diffuse abdominal pain  Inpatient consult to Pulmonology  Consult performed by: Valerie Kahn MD  Consult ordered by: Gabby Felipe DO          Review of Systems  +Headache, dizziness, chills, abdominal pain, tenderness, nausea, vomiting, diarrhea  All other systems reviewed and were negative        Historical Information   Past Medical History:   Diagnosis Date    Allergic rhinitis     Anxiety     Depression     Diabetes ([address] )     Hypertension     PTSD (post-traumatic stress disorder)     Sleep difficulties      Past Surgical History:   Procedure Laterality Date    INGUINAL HERNIA REPAIR      KNEE SURGERY      RETINAL DETACHMENT SURGERY      SHOULDER ARTHROSCOPY       Social History   Social History     Substance and Sexual Activity   Alcohol Use Not Currently     Social History     Substance and Sexual Activity   Drug Use No     Social History     Tobacco Use   Smoking Status Former Smoker    Packs/day: 0 25    Years: 5 00    Pack years: 1 25    Types: Cigarettes    Quit date: 1988    Years since quittin 7   Smokeless Tobacco Former User    Quit date: 1988   Tobacco Comment    patient quit smoking 30 yrs ago     E-Cigarette/Vaping     E-Cigarette/Vaping Substances       Family History: non-contributory    Meds/Allergies   all current active meds have been reviewed    Allergies   Allergen Reactions    Amoxicillin GI Intolerance       Objective   Vitals: Blood pressure 95/53, pulse (!) 111, temperature (!) 97 3 °F (36 3 °C), temperature source Temporal, resp  rate 22, height 5' 6" (1 676 m), weight 70 1 kg (154 lb 8 7 oz), SpO2 99 %  on room air,Body mass index is 24 94 kg/m²  Intake/Output Summary (Last 24 hours) at 2021 1456  Last data filed at 2021 1424  Gross per 24 hour   Intake 4043 67 ml   Output --   Net 4043 67 ml     Invasive Devices     Central Venous Catheter Line            CVC Central Lines 21 Triple Right Femoral <1 day          Peripheral Intravenous Line            Peripheral IV Distal;Left;Upper;Ventral (anterior) Arm -- days    Peripheral IV 21 Right Antecubital <1 day          Arterial Line            Arterial Line 21 Right Radial <1 day          Drain            Urethral Catheter Non-latex; Double-lumen 16 Fr  <1 day                Physical Exam  Gen: not in acute distress, Body mass index is 24 94 kg/m²  HEENT: supple,  Chest: normal respiratory efforts, clear breath sounds bilaterally  CV: Regular tachycardia, no murmurs appreciated  Abdomen: diffuse abdominal tenderness  Extremities: no edema  Neuro: AAO X3, no focal motor deficit        Lab Results: I have personally reviewed pertinent lab results  Imaging Studies: I have personally reviewed pertinent films in PACS     EKG, Pathology, and Other Studies: I have personally reviewed pertinent films in PACS     Portions of the record may have been created with voice recognition software    Occasional wrong word or "sound a like" substitutions may have occurred due to the inherent limitations of voice recognition software  Read the chart carefully and recognize, using context, where substitutions have occurred

## 2021-06-17 NOTE — ED ATTENDING ATTESTATION
6/17/2021  I, Beverly Jimenes MD, saw and evaluated the patient  I have discussed the patient with the resident/non-physician practitioner and agree with the resident's/non-physician practitioner's findings, Plan of Care, and MDM as documented in the resident's/non-physician practitioner's note, except where noted  All available labs and Radiology studies were reviewed  I was present for key portions of any procedure(s) performed by the resident/non-physician practitioner and I was immediately available to provide assistance  At this point I agree with the current assessment done in the Emergency Department  I have conducted an independent evaluation of this patient a history and physical is as follows:  Patient is a 81-year-old male  He has been sick for about a day with nausea, vomiting and diarrhea  He has not tolerated p o  No hematochezia, emesis or melena  He is a diabetic  His sugars have been high  He has felt weak and dizzy  Has resolved he has fallen several times  He complained of neck pain to EMS  It is unclear if he struck his head or loss of consciousness  He is not complaining of any other injuries  EMS noted that his abdomen was distended  Pre-hospital his sugar was found to be in the 500s  Physical exam:  Patient is awake and alert  There is some lower cervical spine tenderness  Head exam appears atraumatic  Breath sounds are equal   Regular rate and rhythm  Patient was found to be hypotensive  Patient has a distended abdomen without focal tenderness  There are no peritoneal signs  Extremities are atraumatic  assessment/plan:  Patient is a trauma evaluation  He will undergo CT of head neck  In addition he will undergo CT of chest abdomen and pelvis  IV fluids for blood pressure  In addition to traumatic injuries, differential includes acute gastroenteritis, GI bleed, dehydration, DKA, sepsis and other causes of his symptomatology    In addition imaging, laboratory evaluation pending      ED Course         Critical Care Time  Procedures

## 2021-06-17 NOTE — ASSESSMENT & PLAN NOTE
Lab Results   Component Value Date    HGBA1C 11 3 (H) 09/18/2018       No results for input(s): POCGLU in the last 72 hours      Blood Sugar Average: Last 72 hrs:     · Does not appear to be in diabetic ketoacidosis  · Utilize an IV insulin drip/infusion per the non-DKA protocol  · Hold metformin for now  · Blood glucose monitoring every 1 hour

## 2021-06-17 NOTE — ASSESSMENT & PLAN NOTE
· Chronic right knee pain with history of multiple right knee surgeries  · Followed by Dr Luis Merchant (Orthopedic Surgery) as an outpatient

## 2021-06-17 NOTE — ED PROVIDER NOTES
History  Chief Complaint   Patient presents with    Vomiting     nausea vomiting diarrhea since yesterday  dizziness noted fell yesterday due to same complainining of back pain no blood thinners no loc     HPI    Prior to Admission Medications   Prescriptions Last Dose Informant Patient Reported? Taking?    cholecalciferol 1000 units tablet   No No   Sig: Take 1 tablet (1,000 Units total) by mouth daily Start after finishing Vitamin D2 77551P   cyanocobalamin 1,000 mcg/mL   No No   Sig: Inject 1 mL (1,000 mcg total) into a muscle every 30 (thirty) days   ergocalciferol (VITAMIN D2) 50,000 units   No No   Sig: Take 1 capsule (50,000 Units total) by mouth once a week for 13 doses   insulin glargine (LANTUS) 100 units/mL subcutaneous injection   No No   Sig: Inject 18 Units under the skin daily at bedtime   lisinopril-hydrochlorothiazide (PRINZIDE,ZESTORETIC) 10-12 5 MG per tablet   No No   Sig: Take 1 tablet by mouth daily   loratadine (CLARITIN) 10 mg tablet   Yes No   Sig: Take 10 mg by mouth daily   metFORMIN (GLUCOPHAGE) 500 mg tablet   No No   Sig: Take 1 tablet (500 mg total) by mouth 2 (two) times a day with meals   naproxen (NAPROSYN) 500 mg tablet   No No   Sig: Take 1 tablet (500 mg total) by mouth every 12 (twelve) hours as needed for moderate pain   sertraline (ZOLOFT) 50 mg tablet   No No   Sig: Take 3 tablets (150 mg total) by mouth daily   traZODone (DESYREL) 150 mg tablet   No No   Sig: Take 1 tablet (150 mg total) by mouth daily at bedtime as needed for sleep      Facility-Administered Medications: None       Past Medical History:   Diagnosis Date    Allergic rhinitis     Anxiety     Depression     Diabetes (HCC)     Hypertension     PTSD (post-traumatic stress disorder)     Sleep difficulties        Past Surgical History:   Procedure Laterality Date    INGUINAL HERNIA REPAIR      KNEE SURGERY      RETINAL DETACHMENT SURGERY      SHOULDER ARTHROSCOPY         Family History   Problem Relation Age of Onset    Psychiatric Illness Father      I have reviewed and agree with the history as documented  E-Cigarette/Vaping     E-Cigarette/Vaping Substances     Social History     Tobacco Use    Smoking status: Former Smoker     Packs/day: 0 25     Years: 5 00     Pack years: 1 25     Types: Cigarettes     Quit date: 1988     Years since quittin 7    Smokeless tobacco: Former User     Quit date: 1988    Tobacco comment: patient quit smoking 30 yrs ago   Substance Use Topics    Alcohol use:  Yes     Alcohol/week: 1 0 standard drinks     Types: 1 Cans of beer per week     Comment: socially; monthly    Drug use: No       Review of Systems    Physical Exam  Physical Exam    Vital Signs  ED Triage Vitals   Temperature Pulse Respirations Blood Pressure SpO2   21 1036 21 1043 21 1043 21 1043 21 1043   (!) 96 7 °F (35 9 °C) 101 18 (!) 74/48 94 %      Temp Source Heart Rate Source Patient Position - Orthostatic VS BP Location FiO2 (%)   21 1036 21 1043 21 1043 -- --   Temporal Monitor Lying        Pain Score       --                  Vitals:    21 1043   BP: (!) 74/48   Pulse: 101   Patient Position - Orthostatic VS: Lying         Visual Acuity  Visual Acuity      Most Recent Value   L Pupil Size (mm)  3   R Pupil Size (mm)  3          ED Medications  Medications   sodium chloride (PF) 0 9 % injection 3 mL (has no administration in time range)   sodium chloride 0 9 % infusion (has no administration in time range)     Followed by   sodium chloride 0 9 % infusion (has no administration in time range)     Followed by   sodium chloride 0 9 % infusion (has no administration in time range)   dextrose 5 % and sodium chloride 0 9 % infusion (has no administration in time range)       Diagnostic Studies  Results Reviewed     Procedure Component Value Units Date/Time    Lipase [[id number]]  (Abnormal) Collected: 21 1046    Lab Status: Final result Specimen: Blood from Arm, Right Updated: 06/17/21 1109     Lipase 63 u/L     CK [[id number]]  (Normal) Collected: 06/17/21 1046    Lab Status: Final result Specimen: Blood from Arm, Right Updated: 06/17/21 1109     Total CK 53 U/L     Magnesium [[id number]]  (Abnormal) Collected: 06/17/21 1046    Lab Status: Final result Specimen: Blood from Arm, Right Updated: 06/17/21 1109     Magnesium 2 7 mg/dL     Beta Hydroxybutyrate [[id number]]  (Normal) Collected: 06/17/21 1046    Lab Status: Final result Specimen: Blood from Arm, Right Updated: 06/17/21 1058     BETA-HYDROXYBUTYRATE 0 4 mmol/L     CBC [[id number]]  (Normal) Collected: 06/17/21 1045    Lab Status: Final result Specimen: Blood from Arm, Right Updated: 06/17/21 1057     WBC 9 97 Thousand/uL      RBC 5 12 Million/uL      Hemoglobin 15 3 g/dL      Hematocrit 44 6 %      MCV 87 fL      MCH 29 9 pg      MCHC 34 3 g/dL      RDW 13 3 %      Platelets 570 Thousands/uL      MPV 9 7 fL     Comprehensive metabolic panel [[id number]] Collected: 06/17/21 1046    Lab Status: In process Specimen: Blood from Arm, Right Updated: 06/17/21 1052    Troponin I [[id number]] Collected: 06/17/21 1045    Lab Status: In process Specimen: Blood from Arm, Right Updated: 06/17/21 1052    Blood culture - 2nd set [[id number]] Collected: 06/17/21 1046    Lab Status:  In process Specimen: Blood from Arm, Right Updated: 06/17/21 1052    Blood gas, venous [[id number]] Collected: 06/17/21 1045    Lab Status: No result Specimen: Blood from Arm, Right     Lactic acid, plasma [[id number]] Collected: 06/17/21 1045    Lab Status: No result Specimen: Blood from Arm, Right     Blood culture - 1st Set [[id number]]     Lab Status: No result Specimen: Blood     UA (URINE) with reflex to Scope [[id number]]     Lab Status: No result Specimen: Urine     Novel Coronavirus (Covid-19),PCR [facility] - 2 Hour Stat [[id number]]     Lab Status: No result Specimen: Nares from Nose                  TRAUMA - CT head wo contrast    (Results Pending)   TRAUMA - CT spine cervical wo contrast    (Results Pending)   TRAUMA - CT chest abdomen pelvis w contrast    (Results Pending)   XR chest 1 view portable    (Results Pending)              Procedures  Procedures         ED Course                                     Patient medically cleared for behavioral health evaluation  MDM    Disposition  Final diagnoses:   None     ED Disposition     None      Follow-up Information    None         Patient's Medications   Discharge Prescriptions    No medications on file     No discharge procedures on file      PDMP Review     None          ED Provider  Electronically Signed by           Olivia Murphy MD  06/20/21 6119

## 2021-06-17 NOTE — H&P
[address] [facility] [address]  H&P- Samara Guevara 1960, 62 y o  male MRN: [id number]  Unit/Bed#:  Encounter: [id number]  Primary Care Provider: No primary care provider on file  Date and time admitted to hospital: 6/17/2021 10:33 AM    * Severe sepsis with septic shock [facility])  Assessment & Plan  · Severe sepsis with septic shock was present on admission and secondary to colitis in combination with acute cystitis  · Sirs criteria is met with tachycardia and tachypnea  · Persistent hypotension after the initial NSS IV fluid bolus of 30 ml/kg per the sepsis protocol  · A femoral double-lumen catheter was placed in the emergency department and an IV levophed drip/infusion was initiated at 5 mcg/minute to maintain a MAP of 65 mmHg and greater  · Also with a lactic acidosis  · Check blood cultures x 2 sets  · Check a urinalysis and urine culture  · Check stool cultures including a Clostridium difficile culture, a Rotavirus stool antigen test, a stool culture for enteric bacterial pathogens, and stool for fecal leukocytes  · Check and follow the procalcitonin level  · COVID-19 testing was negative  · Treat with broad-spectrum antibiotics including IV cefepime, IV metronidazole, and IV vancomycin  · Consult General Surgery with the patient having intractable abdominal pain  · Consult Pulmonology/Critical Care Medicine    Abnormal CT scan of head  Assessment & Plan  · Outpatient follow-up with Ophthalmology    CT scan of the head without contrast (06/17/2021): IMPRESSION:     1  Soft tissue density within the left globe, in the vitreous, may represent a dislocated lens  Additionally, there is a calcified structure adjacent to the left retina, posteriorly in the globe, of uncertain etiology  Recommend ophthalmology   consultation    2   No acute intracranial abnormality    Syncope and collapse  Assessment & Plan  · Likely secondary to hypotension  · Check a transthoracic echocardiogram    Right knee pain  Assessment & Plan  · Chronic right knee pain with history of multiple right knee surgeries  · Followed by Dr Kevyn Iyer (Orthopedic Surgery) as an outpatient    Hypercalcemia  Assessment & Plan  · Check an ionized calcium level    Essential hypertension  Assessment & Plan  · Currently with hypotension secondary to septic shock  · Hold all hypertension medications at this time  · Follow the blood pressure trend closely in the ICU    Lactic acidosis  Assessment & Plan  · Secondary to severe sepsis with septic shock and metformin use    HHNC (hyperglycemic hyperosmolar nonketotic coma) (HCC)  Assessment & Plan  Lab Results   Component Value Date    HGBA1C 11 3 (H) 09/18/2018       No results for input(s): POCGLU in the last 72 hours  Blood Sugar Average: Last 72 hrs:     · Does not appear to be in diabetic ketoacidosis  · Utilize an IV insulin drip/infusion per the non-DKA protocol  · Hold metformin for now  · Blood glucose monitoring every 1 hour    Acute cystitis without hematuria  Assessment & Plan  · Please see the plan for "Severe sepsis with septic shock"    Colitis  Assessment & Plan  · Please see the plan for "Severe sepsis with septic shock"    VTE Pharmacologic Prophylaxis: VTE Score: 4 Moderate Risk (Score 3-4) - Pharmacological DVT Prophylaxis Ordered: heparin  and SCD"s on the bilateral lower extremities  Code Status: Level 1 - Full Code       Anticipated Length of Stay: Patient will be admitted on an inpatient basis with an anticipated length of stay of greater than 2 midnights secondary to the need for IV vasopressor treatment, for IV antibiotic treatment, and for continuous IV fluids to treat the severe sepsis with septic shock  Total critical care time spent was 60 minutes      Chief Complaint:  Abdominal pain, nausea, vomiting, and diarrhea    History of Present Illness:  Jesus Moreland is a 62 y o  male who presents to the emergency department with the complaints of intractable abdominal pain, nausea, vomiting, and diarrhea  The patient developed the acute onset of severe, generalized abdominal pain last evening, 06/16/2021  The abdominal pain was constant in nature  The patient then had multiple episodes of nausea, vomiting, and diarrhea  He also experienced multiple falls overnight and this morning with possible syncope  Nothing seemed to improve his symptoms  No chest pain  No shortness of breath  No hematemesis  No melena  No hematochezia  No dysuria  No hematuria  The patient was found to have severe sepsis with septic shock in the emergency department  Review of Systems:  Review of Systems:  Per HPI, all other systems have been reviewed and were negative  Past Medical and Surgical History:   Past Medical History:   Diagnosis Date    Allergic rhinitis     Anxiety     Depression     Diabetes ([address] )     Hypertension     PTSD (post-traumatic stress disorder)     Sleep difficulties        Past Surgical History:   Procedure Laterality Date    INGUINAL HERNIA REPAIR      KNEE SURGERY      RETINAL DETACHMENT SURGERY      SHOULDER ARTHROSCOPY         Meds/Allergies:  Prior to Admission medications    Medication Sig Start Date End Date Taking?  Authorizing Provider   cholecalciferol 1000 units tablet Take 1 tablet (1,000 Units total) by mouth daily Start after finishing Vitamin D2 81259W 12/19/18   Manjit Thomas PA-C   cyanocobalamin 1,000 mcg/mL Inject 1 mL (1,000 mcg total) into a muscle every 30 (thirty) days 10/19/18   Dl Romo PA-C   ergocalciferol (VITAMIN D2) 50,000 units Take 1 capsule (50,000 Units total) by mouth once a week for 13 doses 9/26/18 12/20/18  Manjit Thomas PA-C   insulin glargine (LANTUS) 100 units/mL subcutaneous injection Inject 18 Units under the skin daily at bedtime 9/25/18   Manjit Thomas PA-C   lisinopril-hydrochlorothiazide (PRINZIDE,ZESTORETIC) 10-12 5 MG per tablet Take 1 tablet by mouth daily 9/25/18   Manjit Hill SONDRA Thomas   loratadine (CLARITIN) 10 mg tablet Take 10 mg by mouth daily    Historical Provider, MD   metFORMIN (GLUCOPHAGE) 500 mg tablet Take 1 tablet (500 mg total) by mouth 2 (two) times a day with meals 18   Charlette Xiao PA-C   naproxen (NAPROSYN) 500 mg tablet Take 1 tablet (500 mg total) by mouth every 12 (twelve) hours as needed for moderate pain 18   Maile Thomas PA-C   sertraline (ZOLOFT) 50 mg tablet Take 3 tablets (150 mg total) by mouth daily 10/2/18   LASHELL Grewal   traZODone (DESYREL) 150 mg tablet Take 1 tablet (150 mg total) by mouth daily at bedtime as needed for sleep 10/1/18   LASHELL Grewal     I have reviewed home medications with patient personally  Allergies:    Allergies   Allergen Reactions    Amoxicillin GI Intolerance       Social History:  Marital Status: Single     Patient Pre-hospital Living Situation: Home  Patient Pre-hospital Level of Mobility: walks    Substance Use History:   Social History     Substance and Sexual Activity   Alcohol Use Yes    Alcohol/week: 1 0 standard drinks    Types: 1 Cans of beer per week    Comment: socially; monthly     Social History     Tobacco Use   Smoking Status Former Smoker    Packs/day: 0 25    Years: 5 00    Pack years: 1 25    Types: Cigarettes    Quit date: 1988    Years since quittin 7   Smokeless Tobacco Former User    Quit date: 1988   Tobacco Comment    patient quit smoking 30 yrs ago     Social History     Substance and Sexual Activity   Drug Use No       Family History:  Non-contributory    Physical Exam:     Vitals:   Blood Pressure: (!) 65/45 (21 1431)  Pulse: (!) 106 (21 1345)  Temperature: (!) 96 7 °F (35 9 °C) (21 1043)  Temp Source: Temporal (21 1043)  Respirations: (!) 27 (21 1345)  Weight - Scale: 85 kg (187 lb 6 3 oz) (21 1309)  SpO2: 98 % (21 1345)    Physical Exam   General:  NAD, follows commands  HEENT:  NC/AT, mucous membranes dry  Neck:  Supple, No JVP elevation  CV:  + S1, + S2, Tachycardic, Regular rhythm  Pulm:  Lung fields are CTA bilaterally  Abd:  Soft, Diffusely tender with guarding in the right upper quadrant and epigastric region, Moderate distension  Ext:  No clubbing/cyanosis/edema  Skin:  No rashes  Neuro:  Awake, alert, oriented  Psych:  Normal mood and affect      Additional Data:     Lab Results:  Results from last 7 days   Lab Units 06/17/21  1045   WBC Thousand/uL 9 97   HEMOGLOBIN g/dL 15 3   HEMATOCRIT % 44 6   PLATELETS Thousands/uL 200     Results from last 7 days   Lab Units 06/17/21  1046   SODIUM mmol/L 130*   POTASSIUM mmol/L 4 0   CHLORIDE mmol/L 92*   CO2 mmol/L 24   BUN mg/dL 40*   CREATININE mg/dL 3 00*   ANION GAP mmol/L 14*   CALCIUM mg/dL 9 5   ALBUMIN g/dL 3 0*   TOTAL BILIRUBIN mg/dL 1 12*   ALK PHOS U/L 101   ALT U/L 26   AST U/L 26   GLUCOSE RANDOM mg/dL 786*                 Results from last 7 days   Lab Units 06/17/21  1126   LACTIC ACID mmol/L 5 5*       Imaging: Reviewed radiology reports from this admission including: chest CT scan, abdominal/pelvic CT and CT head  TRAUMA - CT chest abdomen pelvis w contrast   Final Result by Todd Ochoa MD (06/17 1207)      1  No evidence of trauma   2  Findings of colitis within the sigmoid and descending colon   3  Cystitis      I personally discussed impression 1-3 with DMITRIY Jovel on 6/17/2021 at 12:00 PM       Workstation performed: [id number]         TRAUMA - CT spine cervical wo contrast   Final Result by Todd Ochoa MD (06/17 1155)      No cervical spine fracture or traumatic malalignment  Workstation performed: [id number]         TRAUMA - CT head wo contrast   Final Result by Todd Ochoa MD (06/17 1204)      1  Soft tissue density within the left globe, in the vitreous, may represent a dislocated lens    Additionally, there is a calcified structure adjacent to the left retina, posteriorly in the globe, of uncertain etiology  Recommend ophthalmology    consultation  2   No acute intracranial abnormality      I personally discussed this study  with John Geller on 6/17/2021 at 12:00 PM            Workstation performed: [id number]         XR chest 1 view portable    (Results Pending)       EKG and Other Studies Reviewed on Admission:   · EKG: Sinus Tachycardia   bpm     ** Please Note: This note has been constructed using a voice recognition system   **

## 2021-06-17 NOTE — ASSESSMENT & PLAN NOTE
· Severe sepsis with septic shock was present on admission and secondary to colitis in combination with acute cystitis  · Sirs criteria is met with tachycardia and tachypnea  · Persistent hypotension after the initial NSS IV fluid bolus of 30 ml/kg per the sepsis protocol  · A femoral double-lumen catheter was placed in the emergency department and an IV levophed drip/infusion was initiated at 5 mcg/minute to main a MAP of 65 mmHg and greater  · Also with a lactic acidosis  · Check blood cultures x 2 sets  · Check a urinalysis and urine culture  · Check stool cultures including a Clostridium difficile culture, a Rotavirus stool antigen test, a stool culture for enteric bacterial pathogens, and stool for fecal leukocytes  · Check and follow the procalcitonin level  · COVID-19 testing was negative  · Treat with broad-spectrum antibiotics including IV cefepime, IV metronidazole, and IV vancomycin    · Consult General Surgery with the patient having intractable abdominal pain  · Consult Pulmonology/Critical Care Medicine

## 2021-06-18 PROBLEM — E87.2 LACTIC ACIDOSIS: Status: RESOLVED | Noted: 2021-01-01 | Resolved: 2021-01-01

## 2021-06-18 NOTE — ASSESSMENT & PLAN NOTE
Lab Results   Component Value Date    HGBA1C 11 3 (H) 09/18/2018       Recent Labs     06/17/21  1629 06/17/21  1748 06/17/21  1848 06/17/21  2115   POCGLU 328* 307* 262* 207*       Blood Sugar Average: Last 72 hrs:  (P) 365 4   · Does not appear to be in diabetic ketoacidosis  · Utilize an IV insulin drip/infusion per the non-DKA protocol  · Hold metformin for now  · Blood glucose monitoring every 1 hour

## 2021-06-18 NOTE — H&P
[address]  H&P- Sonia Rojo 1960, 62 y o  male MRN: [id number]  Unit/Bed#: ICU 01 Encounter: [id number]  Primary Care Provider: No primary care provider on file     Date and time admitted to hospital: 6/17/2021  8:38 PM    Severe sepsis with septic shock [facility])  Assessment & Plan  · Secondary to gastroenteritis/colitis/cystitis  · Admitted with diarrhea and vomiting after eating pudding at a local farmers market  · Concern for C diff, salmonella/shigella, anaerobes  · Continue broad spectrum antibiotics - cipro, vanco, flagyl, oral vanco   · Continue IV fluids  · Continue vasopressors    Colitis  Assessment & Plan  · Evaluated by surgery and no indication for surgical intervention at this time  · Continue IV fluids    KAYLEE (acute kidney injury) ([facility] [address] )  Assessment & Plan  · Continue IV fluids  · Avoid nephrotoxins  · Monitor I and O    Lactic acidosis  Assessment & Plan  · Due to septic shock  · improving with fluid administartation    Type 2 diabetes mellitus [facility])  Assessment & Plan  Lab Results   Component Value Date    HGBA1C 11 3 (H) 09/18/2018       Recent Labs     06/17/21  1542 06/17/21  1629 06/17/21  1748 06/17/21  1848   POCGLU 430* 328* 307* 262*       Blood Sugar Average: Last 72 hrs:     · Continue insulin infusion    Acute cystitis without hematuria  Assessment & Plan  · Continue broad spectrum anitbiotics    Anxiety  Assessment & Plan  · Hold outpatient zoloft and trazadone    Essential hypertension  Assessment & Plan  · Hold outpatient medications  · Currently requiring vasopressors      -------------------------------------------------------------------------------------------------------------  Chief Complaint: diarrhea and vomiting    History of Present Illness   HX and PE limited by:   Sonia Rojo is a 62 y o  male with a history of HTN, DM2, and anxiety/ depression, allergic rhinitis       Per Dr Luis Salas the patient presented to the ED in this a m  with vomiting/ diarrhea also had a fall earlier today after having dizziness  No hematemesis or melena or hematochezia  He became dizzy after multiple episodes of vomiting and then further  He was found to be hypotensive with lactic acidosis of 5 5  After fluid resuscitation with about 5 L of normal saline, he was started on norepinephrine drip, central line was placed        CT chest abdomen and pelvis with contrast showed evidence of colitis at the sigmoid/ until descending colon  As well as cystitis  He was started on antibiotic, initially given Zosyn x1, then switched to vancomycin/cefepime /metronidazole  Patient was transferred to [facility] for a higher level of care    History obtained from chart review and the patient   -------------------------------------------------------------------------------------------------------------  Dispo: Admit to Critical Care     Code Status: Level 1 - Full Code  --------------------------------------------------------------------------------------------------------------  Review of Systems   Gastrointestinal: Positive for diarrhea, nausea and vomiting  A 12-point, complete review of systems was reviewed and negative except as stated above     Physical Exam  Vitals reviewed  HENT:      Head: Normocephalic  Nose: Nose normal       Mouth/Throat:      Mouth: Mucous membranes are dry  Eyes:      Extraocular Movements: Extraocular movements intact  Pupils: Pupils are equal, round, and reactive to light  Cardiovascular:      Rate and Rhythm: Regular rhythm  Tachycardia present  Pulses: Normal pulses  Heart sounds: Normal heart sounds  Pulmonary:      Effort: Pulmonary effort is normal       Breath sounds: Normal breath sounds  Abdominal:      General: Abdomen is flat  Bowel sounds are normal  There is no distension  Palpations: Abdomen is soft  Tenderness: There is no abdominal tenderness     Musculoskeletal:         General: No swelling  Normal range of motion  Cervical back: Normal range of motion  Skin:     General: Skin is warm and dry  Neurological:      General: No focal deficit present  Mental Status: He is alert  Psychiatric:         Mood and Affect: Mood normal        --------------------------------------------------------------------------------------------------------------  Vitals:   Vitals:    06/17/21 2045 06/17/21 2100   Pulse: (!) 132 (!) 132   Resp: (!) 29 17   Temp: 98 1 °F (36 7 °C)    TempSrc: Temporal    SpO2: 97% 97%   Weight: 80 8 kg (178 lb 2 1 oz)    Height: 5' 6" (1 676 m)      Temp  Min: 96 7 °F (35 9 °C)  Max: 98 1 °F (36 7 °C)  IBW (Ideal Body Weight): 63 8 kg  Height: 5' 6" (167 6 cm)  Body mass index is 28 75 kg/m²      Laboratory and Diagnostics:  Results from last 7 days   Lab Units 06/17/21 1727 06/17/21  1045   WBC Thousand/uL 9 19 9 97   HEMOGLOBIN g/dL 14 0 15 3   HEMATOCRIT % 40 8 44 6   PLATELETS Thousands/uL 181 200   BANDS PCT % 3  --    MONO PCT % 4  --      Results from last 7 days   Lab Units 06/17/21 1727 06/17/21  1437 06/17/21  1046   SODIUM mmol/L 141 137 130*   POTASSIUM mmol/L 3 5 3 9 4 0   CHLORIDE mmol/L 104 103 92*   CO2 mmol/L 24 22 24   ANION GAP mmol/L 13 12 14*   BUN mg/dL 34* 34* 40*   CREATININE mg/dL 2 06* 2 35* 3 00*   CALCIUM mg/dL 7 6* 8 5 9 5   GLUCOSE RANDOM mg/dL 284* 444* 786*   ALT U/L 57  --  26   AST U/L 58*  --  26   ALK PHOS U/L 68  --  101   ALBUMIN g/dL 2 4*  --  3 0*   TOTAL BILIRUBIN mg/dL 0 83  --  1 12*     Results from last 7 days   Lab Units 06/17/21  1727 06/17/21  1437 06/17/21  1046   MAGNESIUM mg/dL 2 0 2 3 2 7*   PHOSPHORUS mg/dL 2 0*  --   --            Results from last 7 days   Lab Units 06/17/21  1437 06/17/21  1045   TROPONIN I ng/mL <0 02 <0 02     Results from last 7 days   Lab Units 06/17/21  1727 06/17/21  1437 06/17/21  1126   LACTIC ACID mmol/L 4 8* 6 4* 5 5*     ABG:  Results from last 7 days   Lab Units 06/17/21  1724   PH ART 7 402   PCO2 ART mm Hg 33 4*   PO2 ART mm Hg 76 6   HCO3 ART mmol/L 20 3*   BASE EXC ART mmol/L -3 6   ABG SOURCE  Line, Arterial     VBG:  Results from last 7 days   Lab Units 21  1724 21  1126   PH AMANDA   --  7 274*   PCO2 AMANDA mm Hg  --  46 4   PO2 AMANDA mm Hg  --  36 6   HCO3 AMANDA mmol/L  --  21 0*   BASE EXC AMANDA mmol/L  --  -5 8   ABG SOURCE  Line, Arterial  --            Micro:  Results from last 7 days   Lab Units 21  1120 21  1046   BLOOD CULTURE  Received in Microbiology Lab  Culture in Progress  Received in Microbiology Lab  Culture in Progress  EKG:   Imaging: I have personally reviewed pertinent reports          Historical Information   Past Medical History:   Diagnosis Date    Allergic rhinitis     Anxiety     Depression     Diabetes ([address] )     Hypertension     PTSD (post-traumatic stress disorder)     Sleep difficulties      Past Surgical History:   Procedure Laterality Date    INGUINAL HERNIA REPAIR      KNEE SURGERY      RETINAL DETACHMENT SURGERY      SHOULDER ARTHROSCOPY       Social History   Social History     Substance and Sexual Activity   Alcohol Use Not Currently     Social History     Substance and Sexual Activity   Drug Use No     Social History     Tobacco Use   Smoking Status Former Smoker    Packs/day: 0 25    Years: 5 00    Pack years: 1 25    Types: Cigarettes    Quit date: 1988    Years since quittin 7   Smokeless Tobacco Former User    Quit date: 1988   Tobacco Comment    patient quit smoking 30 yrs ago     Exercise History:   Family History:   Family History   Problem Relation Age of Onset    Psychiatric Illness Father      I have reviewed this patient's family history and commented on sigificant items within the HPI      Medications:  Current Facility-Administered Medications   Medication Dose Route Frequency    [START ON 2021] cholecalciferol (VITAMIN D3) tablet 1,000 Units  1,000 Units Oral Daily    [START ON 6/18/2021] ciprofloxacin (CIPRO) IVPB (premix in 5% dextrose) 400 mg 200 mL  400 mg Intravenous Q12H    dextrose 50 % IV solution 25 mL  25 mL Intravenous Daily PRN    heparin (porcine) subcutaneous injection 5,000 Units  5,000 Units Subcutaneous Q8H [address]    insulin regular (HumuLIN R,NovoLIN R) 1 Units/mL in sodium chloride 0 9 % 100 mL infusion  0 3-21 Units/hr Intravenous Titrated    [START ON 6/18/2021] metroNIDAZOLE (FLAGYL) IVPB (premix) 500 mg 100 mL  500 mg Intravenous Q8H    [START ON 6/18/2021] multi-electrolyte (ISOLYTE-S PH 7 4 equivalent) IV solution  125 mL/hr Intravenous Continuous    multi-electrolyte (ISOLYTE-S PH 7 4 equivalent) IV solution  250 mL/hr Intravenous Continuous    NOREPINEPHRINE 4 MG  ML NSS (CMPD ORDER) infusion  5 mcg/min Intravenous Titrated    ondansetron (ZOFRAN) injection 4 mg  4 mg Intravenous Q6H PRN    phenylephrine (LORENE-SYNEPHRINE) 50 mg (STANDARD CONCENTRATION) in sodium chloride 0 9% 250 mL   mcg/min Intravenous Titrated    sodium chloride (PF) 0 9 % injection 3 mL  3 mL Intravenous Q1H PRN    [START ON 6/18/2021] vancomycin (VANCOCIN) oral solution 500 mg  500 mg Oral Q6H [address]    vasopressin (PITRESSIN) 20 Units in sodium chloride 0 9 % 100 mL infusion  0 04 Units/min Intravenous Continuous     Home medications:  Prior to Admission Medications   Prescriptions Last Dose Informant Patient Reported? Taking?    cholecalciferol 1000 units tablet   No No   Sig: Take 1 tablet (1,000 Units total) by mouth daily Start after finishing Vitamin D2 80230J   cyanocobalamin 1,000 mcg/mL   No No   Sig: Inject 1 mL (1,000 mcg total) into a muscle every 30 (thirty) days   ergocalciferol (VITAMIN D2) 50,000 units   No No   Sig: Take 1 capsule (50,000 Units total) by mouth once a week for 13 doses   insulin glargine (LANTUS) 100 units/mL subcutaneous injection   No No   Sig: Inject 18 Units under the skin daily at bedtime   Patient taking differently: Inject 50 Units under the skin daily at bedtime    lisinopril-hydrochlorothiazide (PRINZIDE,ZESTORETIC) 10-12 5 MG per tablet   No No   Sig: Take 1 tablet by mouth daily   loratadine (CLARITIN) 10 mg tablet   Yes No   Sig: Take 10 mg by mouth daily   metFORMIN (GLUCOPHAGE) 500 mg tablet   No No   Sig: Take 1 tablet (500 mg total) by mouth 2 (two) times a day with meals   Patient not taking: Reported on 6/17/2021   naproxen (NAPROSYN) 500 mg tablet   No No   Sig: Take 1 tablet (500 mg total) by mouth every 12 (twelve) hours as needed for moderate pain   Patient not taking: Reported on 6/17/2021   sertraline (ZOLOFT) 50 mg tablet   No No   Sig: Take 3 tablets (150 mg total) by mouth daily   traZODone (DESYREL) 150 mg tablet   No No   Sig: Take 1 tablet (150 mg total) by mouth daily at bedtime as needed for sleep   Patient not taking: Reported on 6/17/2021      Facility-Administered Medications: None     Allergies: Allergies   Allergen Reactions    Amoxicillin GI Intolerance       ------------------------------------------------------------------------------------------------------------  Advance Directive and Living Will:      Power of :    POLST:    ------------------------------------------------------------------------------------------------------------  Anticipated Length of Stay is > 2 midnights    Care Time Delivered:   Upon my evaluation, this patient had a high probability of imminent or life-threatening deterioration due to sepsis with shock, which required my direct attention, intervention, and personal management  I have personally provided 40 minutes (2050 to 2115) of critical care time, exclusive of procedures, teaching, family meetings, and any prior time recorded by providers other than myself  LASHELL Lr        Portions of the record may have been created with voice recognition software    Occasional wrong word or "sound a like" substitutions may have occurred due to the inherent limitations of voice recognition software    Read the chart carefully and recognize, using context, where substitutions have occurred

## 2021-06-18 NOTE — PLAN OF CARE
Problem: METABOLIC, FLUID AND ELECTROLYTES - ADULT  Goal: Electrolytes maintained within normal limits  Description: INTERVENTIONS:  - Monitor labs and assess patient for signs and symptoms of electrolyte imbalances  - Administer electrolyte replacement as ordered  - Monitor response to electrolyte replacements, including repeat lab results as appropriate  - Instruct patient on fluid and nutrition as appropriate  Outcome: Progressing  Goal: Fluid balance maintained  Description: INTERVENTIONS:  - Monitor labs   - Monitor I/O and WT  - Instruct patient on fluid and nutrition as appropriate  - Assess for signs & symptoms of volume excess or deficit  Outcome: Progressing  Goal: Glucose maintained within target range  Description: INTERVENTIONS:  - Monitor Blood Glucose as ordered  - Assess for signs and symptoms of hyperglycemia and hypoglycemia  - Administer ordered medications to maintain glucose within target range  - Assess nutritional intake and initiate nutrition service referral as needed  Outcome: Progressing     Problem: GASTROINTESTINAL - ADULT  Goal: Minimal or absence of nausea and/or vomiting  Description: INTERVENTIONS:  - Administer IV fluids if ordered to ensure adequate hydration  - Maintain NPO status until nausea and vomiting are resolved  - Nasogastric tube if ordered  - Administer ordered antiemetic medications as needed  - Provide nonpharmacologic comfort measures as appropriate  - Advance diet as tolerated, if ordered  - Consider nutrition services referral to assist patient with adequate nutrition and appropriate food choices  Outcome: Progressing  Goal: Maintains or returns to baseline bowel function  Description: INTERVENTIONS:  - Assess bowel function  - Encourage oral fluids to ensure adequate hydration  - Administer IV fluids if ordered to ensure adequate hydration  - Administer ordered medications as needed  - Encourage mobilization and activity  - Consider nutritional services referral to assist patient with adequate nutrition and appropriate food choices  Outcome: Progressing  Goal: Maintains adequate nutritional intake  Description: INTERVENTIONS:  - Monitor percentage of each meal consumed  - Identify factors contributing to decreased intake, treat as appropriate  - Assist with meals as needed  - Monitor I&O, weight, and lab values if indicated  - Obtain nutrition services referral as needed  Outcome: Progressing  Goal: Establish and maintain optimal ostomy function  Description: INTERVENTIONS:  - Assess bowel function  - Encourage oral fluids to ensure adequate hydration  - Administer IV fluids if ordered to ensure adequate hydration   - Administer ordered medications as needed  - Encourage mobilization and activity  - Nutrition services referral to assist patient with appropriate food choices  - Assess stoma site  - Consider wound care consult   Outcome: Progressing  Goal: Oral mucous membranes remain intact  Description: INTERVENTIONS  - Assess oral mucosa and hygiene practices  - Implement preventative oral hygiene regimen  - Implement oral medicated treatments as ordered  - Initiate Nutrition services referral as needed  Outcome: Progressing

## 2021-06-18 NOTE — ASSESSMENT & PLAN NOTE
· Secondary to gastroenteritis/colitis/cystitis  · Admitted with diarrhea and vomiting after eating pudding at a local farmers market  · Concern for C diff, salmonella/shigella, anaerobes  · Continue broad spectrum antibiotics - cipro, vanco, flagyl, oral vanco   · Continue IV fluids  · Continue vasopressors

## 2021-06-18 NOTE — PLAN OF CARE
Problem: PAIN - ADULT  Goal: Verbalizes/displays adequate comfort level or baseline comfort level  Description: Interventions:  - Encourage patient to monitor pain and request assistance  - Assess pain using appropriate pain scale  - Administer analgesics based on type and severity of pain and evaluate response  - Implement non-pharmacological measures as appropriate and evaluate response  - Consider cultural and social influences on pain and pain management  - Notify physician/advanced practitioner if interventions unsuccessful or patient reports new pain  Outcome: Progressing     Problem: INFECTION - ADULT  Goal: Absence or prevention of progression during hospitalization  Description: INTERVENTIONS:  - Assess and monitor for signs and symptoms of infection  - Monitor lab/diagnostic results  - Monitor all insertion sites, i e  indwelling lines, tubes, and drains  - Monitor endotracheal if appropriate and nasal secretions for changes in amount and color  - [place] appropriate cooling/warming therapies per order  - Administer medications as ordered  - Instruct and encourage patient and family to use good hand hygiene technique  - Identify and instruct in appropriate isolation precautions for identified infection/condition  Outcome: Progressing  Goal: Absence of fever/infection during neutropenic period  Description: INTERVENTIONS:  - Monitor WBC    Outcome: Progressing     Problem: SAFETY ADULT  Goal: Patient will remain free of falls  Description: INTERVENTIONS:  - Educate patient/family on patient safety including physical limitations  - Instruct patient to call for assistance with activity   - Consult OT/PT to assist with strengthening/mobility   - Keep Call bell within reach  - Keep bed low and locked with side rails adjusted as appropriate  - Keep care items and personal belongings within reach  - Initiate and maintain comfort rounds  - Make Fall Risk Sign visible to staff  - Offer Toileting every 2 Hours, in advance of need  - Initiate/Maintain bed alarm  - Obtain necessary fall risk management equipment: yellow socks, bed alarm   - Apply yellow socks and bracelet for high fall risk patients  - Consider moving patient to room near nurses station  Outcome: Progressing  Goal: Maintain or return to baseline ADL function  Description: INTERVENTIONS:  -  Assess patient's ability to carry out ADLs; assess patient's baseline for ADL function and identify physical deficits which impact ability to perform ADLs (bathing, care of mouth/teeth, toileting, grooming, dressing, etc )  - Assess/evaluate cause of self-care deficits   - Assess range of motion  - Assess patient's mobility; develop plan if impaired  - Assess patient's need for assistive devices and provide as appropriate  - Encourage maximum independence but intervene and supervise when necessary  - Involve family in performance of ADLs  - Assess for home care needs following discharge   - Consider OT consult to assist with ADL evaluation and planning for discharge  - Provide patient education as appropriate  Outcome: Progressing  Goal: Maintains/Returns to pre admission functional level  Description: INTERVENTIONS:  - Perform BMAT or MOVE assessment daily    - Set and communicate daily mobility goal to care team and patient/family/caregiver  - Collaborate with rehabilitation services on mobility goals if consulted  - Perform Range of Motion 4 times a day  - Reposition patient every 2 hours    - Dangle patient 4 times a day  - Stand patient 4 times a day  - Ambulate patient 4 times a day  - Out of bed to chair 3 times a day   - Out of bed for meals 3  Problem: DISCHARGE PLANNING  Goal: Discharge to home or other facility with appropriate resources  Description: INTERVENTIONS:  - Identify barriers to discharge w/patient and caregiver  - Arrange for needed discharge resources and transportation as appropriate  - Identify discharge learning needs (meds, wound care, etc )  - Arrange for interpretive services to assist at discharge as needed  - Refer to Case Management Department for coordinating discharge planning if the patient needs post-hospital services based on physician/advanced practitioner order or complex needs related to functional status, cognitive ability, or social support system  Outcome: Progressing     Problem: Knowledge Deficit  Goal: Patient/family/caregiver demonstrates understanding of disease process, treatment plan, medications, and discharge instructions  Description: Complete learning assessment and assess knowledge base    Interventions:  - Provide teaching at level of understanding  - Provide teaching via preferred learning methods  Outcome: Progressing     Problem: GASTROINTESTINAL - ADULT  Goal: Minimal or absence of nausea and/or vomiting  Description: INTERVENTIONS:  - Administer IV fluids if ordered to ensure adequate hydration  - Maintain NPO status until nausea and vomiting are resolved  - Nasogastric tube if ordered  - Administer ordered antiemetic medications as needed  - Provide nonpharmacologic comfort measures as appropriate  - Advance diet as tolerated, if ordered  - Consider nutrition services referral to assist patient with adequate nutrition and appropriate food choices  Outcome: Progressing  Goal: Maintains or returns to baseline bowel function  Description: INTERVENTIONS:  - Assess bowel function  - Encourage oral fluids to ensure adequate hydration  - Administer IV fluids if ordered to ensure adequate hydration  - Administer ordered medications as needed  - Encourage mobilization and activity  - Consider nutritional services referral to assist patient with adequate nutrition and appropriate food choices  Outcome: Progressing  Goal: Maintains adequate nutritional intake  Description: INTERVENTIONS:  - Monitor percentage of each meal consumed  - Identify factors contributing to decreased intake, treat as appropriate  - Assist with meals as needed  - Monitor I&O, weight, and lab values if indicated  - Obtain nutrition services referral as needed  Outcome: Progressing  Goal: Establish and maintain optimal ostomy function  Description: INTERVENTIONS:  - Assess bowel function  - Encourage oral fluids to ensure adequate hydration  - Administer IV fluids if ordered to ensure adequate hydration   - Administer ordered medications as needed  - Encourage mobilization and activity  - Nutrition services referral to assist patient with appropriate food choices  - Assess stoma site  - Consider wound care consult   Outcome: Progressing  Goal: Oral mucous membranes remain intact  Description: INTERVENTIONS  - Assess oral mucosa and hygiene practices  - Implement preventative oral hygiene regimen  - Implement oral medicated treatments as ordered  - Initiate Nutrition services referral as needed  Outcome: Progressing     Problem: METABOLIC, FLUID AND ELECTROLYTES - ADULT  Goal: Electrolytes maintained within normal limits  Description: INTERVENTIONS:  - Monitor labs and assess patient for signs and symptoms of electrolyte imbalances  - Administer electrolyte replacement as ordered  - Monitor response to electrolyte replacements, including repeat lab results as appropriate  - Instruct patient on fluid and nutrition as appropriate  Outcome: Progressing  Goal: Fluid balance maintained  Description: INTERVENTIONS:  - Monitor labs   - Monitor I/O and WT  - Instruct patient on fluid and nutrition as appropriate  - Assess for signs & symptoms of volume excess or deficit  Outcome: Progressing  Goal: Glucose maintained within target range  Description: INTERVENTIONS:  - Monitor Blood Glucose as ordered  - Assess for signs and symptoms of hyperglycemia and hypoglycemia  - Administer ordered medications to maintain glucose within target range  - Assess nutritional intake and initiate nutrition service referral as needed  Outcome: Progressing    times a day  - Out of bed for toileting  - Record patient progress and toleration of activity level   Outcome: Progressing

## 2021-06-18 NOTE — ASSESSMENT & PLAN NOTE
Lab Results   Component Value Date    HGBA1C 11 3 (H) 09/18/2018       Recent Labs     06/17/21  1542 06/17/21  1629 06/17/21  1748 06/17/21  1848   POCGLU 430* 328* 307* 262*       Blood Sugar Average: Last 72 hrs:     · Continue insulin infusion

## 2021-06-18 NOTE — PROCEDURES
Arterial Line Insertion    Date/Time: 6/17/2021 3:17 PM  Performed by: Jackson León MD  Authorized by: Jackson León MD     Patient location:  Bedside  Consent:     Consent obtained:  Verbal    Consent given by:  Patient  Universal protocol:     Patient identity confirmed:  Arm band  Indications:     Indications: hemodynamic monitoring and continuous blood pressure monitoring    Pre-procedure details:     Skin preparation:  Chlorhexidine  Procedure details:     Laterality:  Right    Needle gauge:  20 G    Number of attempts:  1    Transducer: waveform confirmed    Post-procedure details:     Patient tolerance of procedure:   Tolerated well, no immediate complications

## 2021-06-18 NOTE — PLAN OF CARE
Problem: PAIN - ADULT  Goal: Verbalizes/displays adequate comfort level or baseline comfort level  Description: Interventions:  - Encourage patient to monitor pain and request assistance  - Assess pain using appropriate pain scale  - Administer analgesics based on type and severity of pain and evaluate response  - Implement non-pharmacological measures as appropriate and evaluate response  - Consider cultural and social influences on pain and pain management  - Notify physician/advanced practitioner if interventions unsuccessful or patient reports new pain  Outcome: Progressing     Problem: INFECTION - ADULT  Goal: Absence or prevention of progression during hospitalization  Description: INTERVENTIONS:  - Assess and monitor for signs and symptoms of infection  - Monitor lab/diagnostic results  - Monitor all insertion sites, i e  indwelling lines, tubes, and drains  - Monitor endotracheal if appropriate and nasal secretions for changes in amount and color  - [place] appropriate cooling/warming therapies per order  - Administer medications as ordered  - Instruct and encourage patient and family to use good hand hygiene technique  - Identify and instruct in appropriate isolation precautions for identified infection/condition  Outcome: Progressing  Goal: Absence of fever/infection during neutropenic period  Description: INTERVENTIONS:  - Monitor WBC    Outcome: Progressing     Problem: SAFETY ADULT  Goal: Patient will remain free of falls  Description: INTERVENTIONS:  - Educate patient/family on patient safety including physical limitations  - Instruct patient to call for assistance with activity   - Consult OT/PT to assist with strengthening/mobility   - Keep Call bell within reach  - Keep bed low and locked with side rails adjusted as appropriate  - Keep care items and personal belongings within reach  - Initiate and maintain comfort rounds  - Make Fall Risk Sign visible to staff  - Offer Toileting every 2 Hours, in advance of need  - Initiate/Maintain bedalarm  - Obtain necessary fall risk management equipment: bed alarm  - Apply yellow socks and bracelet for high fall risk patients  - Consider moving patient to room near nurses station  Outcome: Progressing  Goal: Maintain or return to baseline ADL function  Description: INTERVENTIONS:  -  Assess patient's ability to carry out ADLs; assess patient's baseline for ADL function and identify physical deficits which impact ability to perform ADLs (bathing, care of mouth/teeth, toileting, grooming, dressing, etc )  - Assess/evaluate cause of self-care deficits   - Assess range of motion  - Assess patient's mobility; develop plan if impaired  - Assess patient's need for assistive devices and provide as appropriate  - Encourage maximum independence but intervene and supervise when necessary  - Involve family in performance of ADLs  - Assess for home care needs following discharge   - Consider OT consult to assist with ADL evaluation and planning for discharge  - Provide patient education as appropriate  Outcome: Progressing     Problem: DISCHARGE PLANNING  Goal: Discharge to home or other facility with appropriate resources  Description: INTERVENTIONS:  - Identify barriers to discharge w/patient and caregiver  - Arrange for needed discharge resources and transportation as appropriate  - Identify discharge learning needs (meds, wound care, etc )  - Arrange for interpretive services to assist at discharge as needed  - Refer to Case Management Department for coordinating discharge planning if the patient needs post-hospital services based on physician/advanced practitioner order or complex needs related to functional status, cognitive ability, or social support system  Outcome: Progressing     Problem: Knowledge Deficit  Goal: Patient/family/caregiver demonstrates understanding of disease process, treatment plan, medications, and discharge instructions  Description: Complete learning assessment and assess knowledge base    Interventions:  - Provide teaching at level of understanding  - Provide teaching via preferred learning methods  Outcome: Progressing     Problem: GASTROINTESTINAL - ADULT  Goal: Minimal or absence of nausea and/or vomiting  Description: INTERVENTIONS:  - Administer IV fluids if ordered to ensure adequate hydration  - Maintain NPO status until nausea and vomiting are resolved  - Nasogastric tube if ordered  - Administer ordered antiemetic medications as needed  - Provide nonpharmacologic comfort measures as appropriate  - Advance diet as tolerated, if ordered  - Consider nutrition services referral to assist patient with adequate nutrition and appropriate food choices  Outcome: Progressing  Goal: Maintains or returns to baseline bowel function  Description: INTERVENTIONS:  - Assess bowel function  - Encourage oral fluids to ensure adequate hydration  - Administer IV fluids if ordered to ensure adequate hydration  - Administer ordered medications as needed  - Encourage mobilization and activity  - Consider nutritional services referral to assist patient with adequate nutrition and appropriate food choices  Outcome: Progressing  Goal: Maintains adequate nutritional intake  Description: INTERVENTIONS:  - Monitor percentage of each meal consumed  - Identify factors contributing to decreased intake, treat as appropriate  - Assist with meals as needed  - Monitor I&O, weight, and lab values if indicated  - Obtain nutrition services referral as needed  Outcome: Progressing  Goal: Establish and maintain optimal ostomy function  Description: INTERVENTIONS:  - Assess bowel function  - Encourage oral fluids to ensure adequate hydration  - Administer IV fluids if ordered to ensure adequate hydration   - Administer ordered medications as needed  - Encourage mobilization and activity  - Nutrition services referral to assist patient with appropriate food choices  - Assess stoma site  - Consider wound care consult   Outcome: Progressing  Goal: Oral mucous membranes remain intact  Description: INTERVENTIONS  - Assess oral mucosa and hygiene practices  - Implement preventative oral hygiene regimen  - Implement oral medicated treatments as ordered  - Initiate Nutrition services referral as needed  Outcome: Progressing     Problem: METABOLIC, FLUID AND ELECTROLYTES - ADULT  Goal: Electrolytes maintained within normal limits  Description: INTERVENTIONS:  - Monitor labs and assess patient for signs and symptoms of electrolyte imbalances  - Administer electrolyte replacement as ordered  - Monitor response to electrolyte replacements, including repeat lab results as appropriate  - Instruct patient on fluid and nutrition as appropriate  Outcome: Progressing  Goal: Fluid balance maintained  Description: INTERVENTIONS:  - Monitor labs   - Monitor I/O and WT  - Instruct patient on fluid and nutrition as appropriate  - Assess for signs & symptoms of volume excess or deficit  Outcome: Progressing  Goal: Glucose maintained within target range  Description: INTERVENTIONS:  - Monitor Blood Glucose as ordered  - Assess for signs and symptoms of hyperglycemia and hypoglycemia  - Administer ordered medications to maintain glucose within target range  - Assess nutritional intake and initiate nutrition service referral as needed  Outcome: Progressing     Problem: Potential for Falls  Goal: Patient will remain free of falls  Description: INTERVENTIONS:  - Educate patient/family on patient safety including physical limitations  - Instruct patient to call for assistance with activity   - Consult OT/PT to assist with strengthening/mobility   - Keep Call bell within reach  - Keep bed low and locked with side rails adjusted as appropriate  - Keep care items and personal belongings within reach  - Initiate and maintain comfort rounds  - Make Fall Risk Sign visible to staff  - Offer Toileting every 2 Hours, in advance of need  - Initiate/Maintain bed alarm  - Obtain necessary fall risk management equipment: yellow socks, bed alarm  - Apply yellow socks and bracelet for high fall risk patients  - Consider moving patient to room near nurses station  Outcome: Progressing     Problem: MOBILITY - ADULT  Goal: Maintain or return to baseline ADL function  Description: INTERVENTIONS:  -  Assess patient's ability to carry out ADLs; assess patient's baseline for ADL function and identify physical deficits which impact ability to perform ADLs (bathing, care of mouth/teeth, toileting, grooming, dressing, etc )  - Assess/evaluate cause of self-care deficits   - Assess range of motion  - Assess patient's mobility; develop plan if impaired  - Assess patient's need for assistive devices and provide as appropriate  - Encourage maximum independence but intervene and supervise when necessary  - Involve family in performance of ADLs  - Assess for home care needs following discharge   - Consider OT consult to assist with ADL evaluation and planning for discharge  - Provide patient education as appropriate  Outcome: Progressing  Goal: Maintains/Returns to pre admission functional level  Description: INTERVENTIONS:  - Perform BMAT or MOVE assessment daily    - Set and communicate daily mobility goal to care team and patient/family/caregiver  - Collaborate with rehabilitation services on mobility goals if consulted  - Perform Range of Motion 4 times a day  - Reposition patient every 2 hours    - Dangle patient 3 times a day  - Stand patient 3 times a day  - Ambulate patient 3 times a day  - Out of bed to chair 3 times a day   - Out of bed for meals 3 times a day  - Out of bed for toileting  - Record patient progress and toleration of activity level   Outcome: Progressing     Problem: Prexisting or High Potential for Compromised Skin Integrity  Goal: Skin integrity is maintained or improved  Description: INTERVENTIONS:  - Identify patients at risk for skin breakdown  - Assess and monitor skin integrity  - Assess and monitor nutrition and hydration status  - Monitor labs   - Assess for incontinence   - Turn and reposition patient  - Assist with mobility/ambulation  - Relieve pressure over bony prominences  - Avoid friction and shearing  - Provide appropriate hygiene as needed including keeping skin clean and dry  - Evaluate need for skin moisturizer/barrier cream  - Collaborate with interdisciplinary team   - Patient/family teaching  - Consider wound care consult   Outcome: Progressing

## 2021-06-18 NOTE — ASSESSMENT & PLAN NOTE
· Secondary to gastroenteritis/colitis/cystitis  · Admitted with diarrhea and vomiting after eating pudding [place] a local [employer]  · Concern for C diff, salmonella/shigella, anaerobes  · CT scan c/a/p: 'Findings of colitis within the sigmoid and descending colon   Cystitis'  · Lactic acid normalized  · Continue broad spectrum antibiotics: IV cipro/flagyl, po Vancomycin   · Continue IV fluids  · Continue vasopressors, wean as able  · Await BCx2  · Await stool studies

## 2021-06-18 NOTE — PROGRESS NOTES
[address] [place] [address]  Progress Note - Mahi Lr 1960, 62 y o  male MRN: [id number]  Unit/Bed#: ICU 01 Encounter: [id number]  Primary Care Provider: No primary care provider on file  Date and time admitted to hospital: 6/17/2021  8:38 PM    * Severe sepsis with septic shock [facility])  Assessment & Plan  · Secondary to gastroenteritis/colitis/cystitis  · Admitted with diarrhea and vomiting after eating pudding [place] local [place]  · Concern for C diff, salmonella/shigella, anaerobes  · CT scan c/a/p: 'Findings of colitis within the sigmoid and descending colon  Cystitis'  · Lactic acid normalized  · Continue broad spectrum antibiotics: IV cipro/flagyl, po Vancomycin   · Continue IV fluids  · Continue vasopressors, wean as able  · Await BCx2  · Await stool studies    Colitis  Assessment & Plan  · Evaluated by surgery and no indication for surgical intervention at this time  · Continue supportive management  · Continue IV antibiotics  · Keep NPO  · Serial abdominal exam  · Consider Surgery or GI eval    Acute cystitis without hematuria  Assessment & Plan  · UA: WBC and RBC 2-4, with occasional bacteria  · Continue broad spectrum anitbiotics  · Await urine culture    KAYLEE (acute kidney injury) ([facility] [address] )  Assessment & Plan  · POA   Baseline appears to be around 0 7-0 9  · Presented with crea of 3  · Likely pre-renal, also in the setting of sepsis  · Continue IV fluids  · Avoid nephrotoxins  · Monitor I and O    Type 2 diabetes mellitus [facility])  Assessment & Plan  Lab Results   Component Value Date    HGBA1C 11 3 (H) 09/18/2018       Recent Labs     06/18/21  0355 06/18/21  0501 06/18/21  0550 06/18/21  0713   POCGLU 178* 283* 268* 196*       Blood Sugar Average: Last 72 hrs:  (P) 220   · On Lantus 18u HS, metformin  · Hold home meds for now  · Continue insulin infusion, accuchecks    Essential hypertension  Assessment & Plan  · On lisinopril-HCTZ 10/12 5mg daily  · Hold home BP meds  · Currently requiring vasopressors    Lactic acidosis  Assessment & Plan  · Due to septic shock  · Resolved    Anxiety  Assessment & Plan  · Hold outpatient zoloft and trazadone      ----------------------------------------------------------------------------------------  HPI/24hr events: continues to be on 2 pressors  No other significant events overnight  Disposition: Continue Critical Care   Code Status: Level 1 - Full Code  ---------------------------------------------------------------------------------------  SUBJECTIVE  Reports abdominal pain is slightly improving  No further diarrheal overnight  Review of Systems   Constitutional: Positive for fatigue  Negative for fever  HENT: Negative for drooling, tinnitus and trouble swallowing  Eyes: Negative for pain and visual disturbance  Respiratory: Negative for cough and shortness of breath  Cardiovascular: Negative for chest pain  Gastrointestinal: Positive for abdominal pain  Negative for diarrhea, nausea and vomiting  Genitourinary: Negative for dysuria and hematuria  Musculoskeletal: Negative for neck pain  Skin: Negative for color change and pallor  Neurological: Positive for weakness  Negative for dizziness and light-headedness  Hematological: Negative for adenopathy  Psychiatric/Behavioral: Negative for confusion and hallucinations  All other systems reviewed and are negative      Review of systems was reviewed and negative unless stated above in HPI/24-hour events   ---------------------------------------------------------------------------------------  OBJECTIVE    Vitals   Vitals:    21 0500 21 0536 21 0600 21 0700   BP:       Pulse: (!) 120  (!) 118 (!) 116   Resp: (!) 30  16 (!) 25   Temp:    97 7 °F (36 5 °C)   TempSrc:    Temporal   SpO2: 98%  96% 97%   Weight:  80 8 kg (178 lb 2 1 oz)     Height:         Temp (24hrs), Av 8 °F (36 6 °C), Min:96 7 °F (35 9 °C), Max:99 2 °F (37 3 °C)  Current: Temperature: 97 7 °F (36 5 °C)  Arterial Line BP: 106/58  Arterial Line MAP (mmHg): 72 mmHg    Respiratory:  SpO2: SpO2: 97 %, SpO2 Activity:  , SpO2 Device: O2 Device: None (Room air)       Invasive/non-invasive ventilation settings   Respiratory    Lab Data (Last 4 hours)    None         O2/Vent Data (Last 4 hours)    None                Physical Exam  Vitals and nursing note reviewed  Constitutional:       General: He is not in acute distress  Appearance: He is ill-appearing  HENT:      Head: Normocephalic and atraumatic  Nose: Nose normal       Mouth/Throat:      Mouth: Mucous membranes are dry  Pharynx: Oropharynx is clear  Eyes:      Conjunctiva/sclera: Conjunctivae normal       Pupils: Pupils are equal, round, and reactive to light  Cardiovascular:      Rate and Rhythm: Regular rhythm  Tachycardia present  Pulses: Normal pulses  Pulmonary:      Effort: Pulmonary effort is normal  No respiratory distress  Breath sounds: Normal breath sounds  Abdominal:      Comments: Hypoactive bowel sounds  Generalized tenderness, no rebound, no guarding   Musculoskeletal:         General: No swelling  Normal range of motion  Cervical back: Normal range of motion  Skin:     General: Skin is warm and dry  Neurological:      General: No focal deficit present  Mental Status: He is alert and oriented to person, place, and time  Mental status is at baseline  Psychiatric:         Mood and Affect: Mood normal          Thought Content:  Thought content normal          Laboratory and Diagnostics:  Results from last 7 days   Lab Units 06/18/21  0511 06/17/21  1727 06/17/21  1045   WBC Thousand/uL 12 81* 9 19 9 97   HEMOGLOBIN g/dL 15 1 14 0 15 3   HEMATOCRIT % 42 8 40 8 44 6   PLATELETS Thousands/uL 218 181 200   BANDS PCT %  --  3  --    MONO PCT %  --  4  --      Results from last 7 days   Lab Units 06/18/21  0330 06/17/21  1727 06/17/21  1437 06/17/21  1046   SODIUM mmol/L 144 141 137 130* POTASSIUM mmol/L 3 6 3 5 3 9 4 0   CHLORIDE mmol/L 109* 104 103 92*   CO2 mmol/L 26 24 22 24   ANION GAP mmol/L 9 13 12 14*   BUN mg/dL 32* 34* 34* 40*   CREATININE mg/dL 1 39* 2 06* 2 35* 3 00*   CALCIUM mg/dL 7 7* 7 6* 8 5 9 5   GLUCOSE RANDOM mg/dL 192* 284* 444* 786*   ALT U/L 186* 57  --  26   AST U/L 154* 58*  --  26   ALK PHOS U/L 68 68  --  101   ALBUMIN g/dL 2 2* 2 4*  --  3 0*   TOTAL BILIRUBIN mg/dL 0 86 0 83  --  1 12*     Results from last 7 days   Lab Units 06/18/21  0330 06/17/21  1727 06/17/21  1437 06/17/21  1046   MAGNESIUM mg/dL 2 1 2 0 2 3 2 7*   PHOSPHORUS mg/dL 2 9 2 0*  --   --            Results from last 7 days   Lab Units 06/18/21  0310 06/17/21  1437 06/17/21  1045   TROPONIN I ng/mL <0 02 <0 02 <0 02     Results from last 7 days   Lab Units 06/18/21  0329 06/17/21  1727 06/17/21  1437 06/17/21  1126   LACTIC ACID mmol/L 2 0 4 8* 6 4* 5 5*     ABG:  Results from last 7 days   Lab Units 06/17/21  1724   PH ART  7 402   PCO2 ART mm Hg 33 4*   PO2 ART mm Hg 76 6   HCO3 ART mmol/L 20 3*   BASE EXC ART mmol/L -3 6   ABG SOURCE  Line, Arterial     VBG:  Results from last 7 days   Lab Units 06/17/21  1724 06/17/21  1126   PH AMANDA   --  7 274*   PCO2 AMANDA mm Hg  --  46 4   PO2 AMANDA mm Hg  --  36 6   HCO3 AMANDA mmol/L  --  21 0*   BASE EXC AMANDA mmol/L  --  -5 8   ABG SOURCE  Line, Arterial  --            Micro  Results from last 7 days   Lab Units 06/17/21  1120 06/17/21  1046   BLOOD CULTURE  Received in Microbiology Lab  Culture in Progress  Received in Microbiology Lab  Culture in Progress  Imaging: I have personally reviewed pertinent reports     and I have personally reviewed pertinent films in PACS    Intake and Output  I/O       06/16 0701 - 06/17 0700 06/17 0701 - 06/18 0700 06/18 0701 - 06/19 0700    I V  (mL/kg)  2060 2 (25 5)     NG/GT  150     IV Piggyback  300     Total Intake(mL/kg)  2510 2 (31 1)     Urine (mL/kg/hr)  1675     Emesis/NG output  250     Total Output  1925     Net +585 2                  Height and Weights   Height: 5' 6" (167 6 cm)  IBW (Ideal Body Weight): 63 8 kg  Body mass index is 28 75 kg/m²  Weight (last 2 days)     Date/Time   Weight    06/18/21 0536   80 8 (178 13)    06/17/21 2045   80 8 (178 13)                Nutrition       Diet Orders   (From admission, onward)             Start     Ordered    06/17/21 2058  Diet NPO  Diet effective now     Question Answer Comment   Diet Type NPO    RD to adjust diet per protocol?  Yes        06/17/21 2057                  Active Medications  Scheduled Meds:  Current Facility-Administered Medications   Medication Dose Route Frequency Provider Last Rate    cholecalciferol  1,000 Units Oral Daily LASHELL Chatterjee      ciprofloxacin  400 mg Intravenous Q12H LASHELL Chatterjee Stopped (06/18/21 0523)    dextrose  25 mL Intravenous Daily PRN LASHELL Chatterjee      heparin (porcine)  5,000 Units Subcutaneous [facility] LASHELL Chatterjee      HYDROmorphone  0 5 mg Intravenous Q3H PRN LASHELL Chatterjee      insulin regular (HumuLIN R,NovoLIN R) infusion  0 3-21 Units/hr Intravenous Titrated HAYLEY ChatterjeeNP 9 Units/hr (06/18/21 0551)    metroNIDAZOLE  500 mg Intravenous Q8H LASHELL Chatterjee Stopped (06/18/21 0213)    multi-electrolyte  125 mL/hr Intravenous Continuous HAYLEY ChatterjeeNP 125 mL/hr (06/18/21 0334)    norepinephrine  1-30 mcg/min Intravenous Titrated HAYLEY ChatterjeeNP 20 mcg/min (06/18/21 0729)    ondansetron  4 mg Intravenous Q6H PRN HAYLEY ChatterjeeNP      sodium chloride (PF)  3 mL Intravenous Q1H PRN HAYLEY ChatterjeeNP      vancomycin  500 mg Oral Q6H [address] [place], CRNP      vasopressin  0 04 Units/min Intravenous Continuous Jose Mcintyres CRNP 0 04 Units/min (06/18/21 0241)     Continuous Infusions:  insulin regular (HumuLIN R,NovoLIN R) infusion, 0 3-21 Units/hr, Last Rate: 9 Units/hr (06/18/21 0551)  multi-electrolyte, 125 mL/hr, Last Rate: 125 mL/hr (06/18/21 1917)  norepinephrine, 1-30 mcg/min, Last Rate: 20 mcg/min (06/18/21 1832)  vasopressin, 0 04 Units/min, Last Rate: 0 04 Units/min (06/18/21 0241)      PRN Meds:   dextrose, 25 mL, Daily PRN  HYDROmorphone, 0 5 mg, Q3H PRN  ondansetron, 4 mg, Q6H PRN  sodium chloride (PF), 3 mL, Q1H PRN        Invasive Devices Review  Invasive Devices     Central Venous Catheter Line            CVC Central Lines 06/17/21 Triple Right Femoral <1 day          Peripheral Intravenous Line            Peripheral IV Distal;Left;Upper;Ventral (anterior) Arm -- days    Peripheral IV 06/17/21 Right Antecubital <1 day          Arterial Line            Arterial Line 06/17/21 Right Radial <1 day          Drain            NG/OG/Enteral Tube Nasogastric 18 Fr Right nares <1 day    Urethral Catheter Non-latex; Double-lumen 16 Fr  <1 day                Rationale for remaining devices: remains critically ill  ---------------------------------------------------------------------------------------  Advance Directive and Living Will:      Power of :    POLST:    ---------------------------------------------------------------------------------------  Care Time Delivered:   Please refer to attending's note/attestation  Hilda Meraz MD      Portions of the record may have been created with voice recognition software  Occasional wrong word or "sound a like" substitutions may have occurred due to the inherent limitations of voice recognition software    Read the chart carefully and recognize, using context, where substitutions have occurred

## 2021-06-18 NOTE — ASSESSMENT & PLAN NOTE
· UA: WBC and RBC 2-4, with occasional bacteria  · Continue broad spectrum anitbiotics  · Await urine culture

## 2021-06-18 NOTE — UTILIZATION REVIEW
Initial Clinical Review    TRANSFER FROM [facility]    Admission: Date/Time/Statement:   Admission Orders (From admission, onward)     Ordered        06/17/21 2056  Inpatient Admission  Once                   Orders Placed This Encounter   Procedures    Inpatient Admission     Standing Status:   Standing     Number of Occurrences:   1     Order Specific Question:   Level of Care     Answer:   Critical Care [15]     Order Specific Question:   Estimated length of stay     Answer:   More than 2 Midnights     Order Specific Question:   Certification     Answer:   I certify that inpatient services are medically necessary for this patient for a duration of greater than two midnights  See H&P and MD Progress Notes for additional information about the patient's course of treatment  Initial Presentation:   62  Y O  Male initially presented to  ED at  [facility]  With vomiting, diarrhea and  Fall after becoming dizzy, had multiple episodes of vomiting  Found hypotensive with lactic acid  5 5  Started on   Norepinephrine  After  About 5  L  NS, central line placed  Ct abd shows  Colitis/cystitis  Started on  TORY,  Transferred to [facility] for higher LOC  PMH  Is essential hypertension, anxiety, DM2  Admit   Ip  ICU  LOC  With  Severe sepsis with septic shock, colitis, KAYLEE and lactic acidosis and plan is  IVF, monitor labs,  TORY, stool c/diff, no surgical intervention at present  Day  2     6/18  Continue  IVF/TORY  Monitor labs, wait stool studies  Remains NPO  Continue serial abdominal exams  Possibly  GI/surgery consult  Monitor I & O  No further diarrhea  Continue close monitoring        ED Triage Vitals   Temperature Pulse Respirations Blood Pressure SpO2   06/17/21 2045 06/17/21 2045 06/17/21 2045 06/17/21 2100 06/17/21 2045   98 1 °F (36 7 °C) (!) 132 (!) 29 112/54 97 %      Temp Source Heart Rate Source Patient Position - Orthostatic VS BP Location FiO2 (%)   06/17/21 2045 06/17/21 2045 -- -- --   Temporal Monitor         Pain Score       06/17/21 2045       7          Wt Readings from Last 1 Encounters:   06/18/21 80 8 kg (178 lb 2 1 oz)     Additional Vital Signs:     06/18/21 0700  97 7 °F (36 5 °C)  116Abnormal   25Abnormal   --  --  106/58  72 mmHg  97 %  --   06/18/21 0600  --  118Abnormal   16  --  --  108/58  72 mmHg  96 %  None (Room air)   06/18/21 0500  --  120Abnormal   30Abnormal   --  --  126/64  84 mmHg  98 %  None (Room air)   06/18/21 0400  --  122Abnormal   23Abnormal   --  --  112/60  76 mmHg  95 %  None (Room air)   06/18/21 0300  --  122Abnormal   25Abnormal   --  --  106/64  78 mmHg  97 %  None (Room air)   06/18/21 0255  99 1 °F (37 3 °C)  --  --  --  --  --  --  --  --   06/18/21 0240  --  122Abnormal   16  --  --  84/50  62 mmHg  98 %  --   06/18/21 0200  --  124Abnormal   24Abnormal   --  --  90/54  68 mmHg  98 %  None (Room air)   06/18/21 0100  --  126Abnormal   26Abnormal   --  --  92/56  70 mmHg  97 %  None (Room air)   06/18/21 0000  --  128Abnormal   29Abnormal   --  --  90/54  66 mmHg  96 %  None (Room air)   06/17/21 2300  99 2 °F (37 3 °C)  130Abnormal   25Abnormal   --  --  104/56  72 mmHg  96 %  None (Room air)   06/17/21 2200  --  132Abnormal   29Abnormal   --  --  128/80  94 mmHg  97 %  None (Room air)   06/17/21 2100  --  132Abnormal   17  112/54  74  112/56  74 mmHg  97 %  None (Room air)   06/17/21 2045  98 1 °F (36 7 °C)  132Abnormal   29Abnormal   --  --  122/72  88 mmHg  97 %  None (Room          Pertinent Labs/Diagnostic Test Results:   KUB   (6/17)   Nonspecific bowel gas pattern without acute abnormality noted  2   Nasogastric tube terminates in the stomach  3   Right common femoral venous catheter as noted     Ct   Chest/abd/pelvis   6/17)    No evidence of trauma   2   Findings of colitis within the sigmoid and descending colon   3   Cystitis   CT  C/spine   ( 6/17)   No fracture  Ct  Head   ( 6/17)     Soft tissue density within the left globe, in the vitreous, may represent a dislocated lens   Additionally, there is a calcified structure adjacent to the left retina, posteriorly in the globe, of uncertain etiology   Recommend ophthalmology   consultation     2   No acute intracranial abnormality  CXR  ( 6/17 )  NAD   Results from last 7 days   Lab Units 06/17/21  1120   SARS-COV-2  Negative     Results from last 7 days   Lab Units 06/18/21  0511 06/17/21  1727 06/17/21  1045   WBC Thousand/uL 12 81* 9 19 9 97   HEMOGLOBIN g/dL 15 1 14 0 15 3   HEMATOCRIT % 42 8 40 8 44 6   PLATELETS Thousands/uL 218 181 200   BANDS PCT %  --  3  --          Results from last 7 days   Lab Units 06/18/21  0330 06/17/21  1727 06/17/21  1437 06/17/21  1046   SODIUM mmol/L 144 141 137 130*   POTASSIUM mmol/L 3 6 3 5 3 9 4 0   CHLORIDE mmol/L 109* 104 103 92*   CO2 mmol/L 26 24 22 24   ANION GAP mmol/L 9 13 12 14*   BUN mg/dL 32* 34* 34* 40*   CREATININE mg/dL 1 39* 2 06* 2 35* 3 00*   EGFR ml/min/1 73sq m 55 34 29 22   CALCIUM mg/dL 7 7* 7 6* 8 5 9 5   CALCIUM, IONIZED mmol/L 1 06* 1 05*  --   --    MAGNESIUM mg/dL 2 1 2 0 2 3 2 7*   PHOSPHORUS mg/dL 2 9 2 0*  --   --      Results from last 7 days   Lab Units 06/18/21  0330 06/17/21  1727 06/17/21  1046   AST U/L 154* 58* 26   ALT U/L 186* 57 26   ALK PHOS U/L 68 68 101   TOTAL PROTEIN g/dL 5 6* 5 4* 6 4   ALBUMIN g/dL 2 2* 2 4* 3 0*   TOTAL BILIRUBIN mg/dL 0 86 0 83 1 12*     Results from last 7 days   Lab Units 06/18/21  1212 06/18/21  1048 06/18/21  0930 06/18/21  0713 06/18/21  0550 06/18/21  0501 06/18/21  0355 06/18/21  0251 06/18/21  0153 06/18/21  0102 06/17/21  2358 06/17/21  2259   POC GLUCOSE mg/dl 149* 153* 158* 196* 268* 283* 178* 164* 193* 249* 261* 208*     Results from last 7 days   Lab Units 06/18/21  0330 06/17/21  1727 06/17/21  1437 06/17/21  1046   GLUCOSE RANDOM mg/dL 192* 284* 444* 786*         Results from last 7 days   Lab Units 06/18/21  0315   HEMOGLOBIN A1C % 12 5*   EAG mg/dl 312 BETA-HYDROXYBUTYRATE   Date Value Ref Range Status   06/17/2021 0 4 <0 6 mmol/L Final      Results from last 7 days   Lab Units 06/17/21  1724   PH ART  7 402   PCO2 ART mm Hg 33 4*   PO2 ART mm Hg 76 6   HCO3 ART mmol/L 20 3*   BASE EXC ART mmol/L -3 6   O2 CONTENT ART mL/dL 18 5   O2 HGB, ARTERIAL % 93 0*   ABG SOURCE  Line, Arterial     Results from last 7 days   Lab Units 06/17/21  1126   PH AMANDA  7 274*   PCO2 AMANDA mm Hg 46 4   PO2 AMANDA mm Hg 36 6   HCO3 AMANDA mmol/L 21 0*   BASE EXC AMANDA mmol/L -5 8   O2 CONTENT AMANDA ml/dL 11 4   O2 HGB, VENOUS % 61 0         Results from last 7 days   Lab Units 06/18/21  0330 06/17/21  1046   CK TOTAL U/L 52 53     Results from last 7 days   Lab Units 06/18/21  0310 06/17/21  1437 06/17/21  1045   TROPONIN I ng/mL <0 02 <0 02 <0 02                 Results from last 7 days   Lab Units 06/18/21  0310   PROCALCITONIN ng/ml 29 74*     Results from last 7 days   Lab Units 06/18/21  0329 06/17/21  1727 06/17/21  1437 06/17/21  1126   LACTIC ACID mmol/L 2 0 4 8* 6 4* 5 5*                         Results from last 7 days   Lab Units 06/17/21  1046   LIPASE u/L 63*             Results from last 7 days   Lab Units 06/17/21  1430 06/17/21  1309   CLARITY UA  Clear Clear   COLOR UA  Yellow Orange   SPEC GRAV UA  <=1 005 <=1 005   PH UA  5 5 5 0   GLUCOSE UA mg/dl >=1000 (1%)* >=1000 (1%)*   KETONES UA mg/dl Negative Negative   BLOOD UA  Trace-Intact* Negative   PROTEIN UA mg/dl Trace* 30 (1+)*   NITRITE UA  Negative Negative   BILIRUBIN UA  Negative Negative   UROBILINOGEN UA E U /dl 0 2 0 2   LEUKOCYTES UA  Elevated glucose may cause decreased leukocyte values  See urine microscopic for [facility] result/* Elevated glucose may cause decreased leukocyte values   See urine microscopic for [facility] result/*   WBC UA /hpf 2-4 None Seen   RBC UA /hpf 2-4 None Seen   BACTERIA UA /hpf Occasional Occasional   EPITHELIAL CELLS WET PREP /hpf Occasional None Seen                 Results from last 7 days   Lab Units 06/18/21  0947   ACETAMINOPHEN LVL ug/mL <2*     Results from last 7 days   Lab Units 06/17/21  1554   C DIFF TOXIN B BY PCR  Negative             Results from last 7 days   Lab Units 06/17/21  2212 06/17/21  2144 06/17/21  1120 06/17/21  1046   BLOOD CULTURE  Received in Microbiology Lab  Culture in Progress  Received in Microbiology Lab  Culture in Progress  Received in Microbiology Lab  Culture in Progress  Received in Microbiology Lab  Culture in Progress  Results from last 7 days   Lab Units 06/17/21  1727   TOTAL COUNTED  100           Present on Admission:   Severe sepsis with septic shock ([facility] [address] )   Colitis   Acute cystitis without hematuria   Lactic acidosis   Essential hypertension   Anxiety      Admitting Diagnosis: Severe sepsis with septic shock [R65 21]  Age/Sex: 62 y o  male  Admission Orders:  Scheduled Medications:  cholecalciferol, 1,000 Units, Oral, Daily  ciprofloxacin, 400 mg, Intravenous, Q12H  heparin (porcine), 5,000 Units, Subcutaneous, Q8H JULIANNA  metroNIDAZOLE, 500 mg, Intravenous, Q8H  vancomycin, 500 mg, Oral, Q6H [address]      Continuous IV Infusions:  insulin regular (HumuLIN R,NovoLIN R) infusion, 0 3-21 Units/hr, Intravenous, Titrated  multi-electrolyte, 125 mL/hr, Intravenous, Continuous  norepinephrine, 1-30 mcg/min, Intravenous, Titrated  vasopressin, 0 04 Units/min, Intravenous, Continuous      PRN Meds:  dextrose, 25 mL, Intravenous, Daily PRN  HYDROmorphone, 0 5 mg, Intravenous, Q3H PRN  ondansetron, 4 mg, Intravenous, Q6H PRN  sodium chloride (PF), 3 mL, Intravenous, Q1H PRN          Network Utilization Review Department  ATTENTION: Please call with any questions or concerns to [phone] and carefully listen to the prompts so that you are directed to the right person   All voicemails are confidential   Liane Valentin all requests for admission clinical reviews, approved or denied determinations and any other requests to dedicated fax number below belonging to the campus where the patient is receiving treatment   List of dedicated fax numbers for the Facilities:  [address] [place] [address] DENIALS (Administrative/Medical Necessity) [phone]   [address] (Maternity/NICU/Pediatrics) [phone]   [address] [place] [address] Dr Dana Platt [phone] [place] [address] Jamsh[address] Yumiko [address] [facility] [place] [address] [phone]

## 2021-06-18 NOTE — QUICK NOTE
Attempted to call brother Jennifer Shoemaker listed on file  Tried multiple times however no response  No options to leave voice mail  Also attempted to call brother Alonzo No however incorrect number noted in chart review  Will attempt to reach out to Jennifer Shoemaker again today to give updates

## 2021-06-18 NOTE — ASSESSMENT & PLAN NOTE
· Evaluated by surgery and no indication for surgical intervention at this time  · Continue supportive management  · Continue IV antibiotics  · Keep NPO  · Serial abdominal exam  · Consider Surgery or GI eval

## 2021-06-18 NOTE — UTILIZATION REVIEW
Inpatient Admission Authorization Request   NOTIFICATION OF INPATIENT ADMISSION/INPATIENT AUTHORIZATION REQUEST   SERVICING FACILITY:   [address] [place] [address]  [address]  Tax ID:  [id number]  NPI: [id number]  Place of Service: [place] [address]  Place of Service Code: 24     ATTENDING PROVIDER:  Attending Name and NPI#: Lynwood Goldmann [[id number]]  Address: [address]  Phone: [phone]     UTILIZATION REVIEW CONTACT:  Tristan Yee Utilization   Network Utilization Review Department  Phone: [phone]  Fax [phone]  Email: [email or web address]     PHYSICIAN ADVISORY SERVICES:  FOR [facility] REVIEW - MEDICAL NECESSITY DENIAL  Phone: [phone]  Fax: [phone]  Email: [email or web address]     TYPE OF REQUEST:  Inpatient Status     ADMISSION INFORMATION:  ADMISSION DATE/TIME: 6/17/21 12:23 PM  PATIENT DIAGNOSIS CODE/DESCRIPTION:  Lactic acidosis [E87 2]  Colitis [K52 9]  Vomiting [R11 10]  Cystitis [N30 90]  Hypotension [I95 9]  Fall [W19  XXXA]  Hyperglycemia [R73 9]  Acute kidney injury ([facility] [address] ) [N17 9]  Severe sepsis with septic shock ([facility] [address] ) [A41 9, R65 21]  DISCHARGE DATE/TIME: 6/17/2021  7:45 PM  DISCHARGE DISPOSITION (IF DISCHARGED): [address]     IMPORTANT INFORMATION:  Please contact the Tristan Yee directly with any questions or concerns regarding this request  Department voicemails are confidential     Send requests for admission clinical reviews, concurrent reviews, approvals, and administrative denials due to lack of clinical to fax [phone]

## 2021-06-18 NOTE — ASSESSMENT & PLAN NOTE
· Severe sepsis with septic shock was present on admission and secondary to colitis in combination with acute cystitis  · Sirs criteria is met with tachycardia and tachypnea  · Persistent hypotension after the initial NSS IV fluid bolus of 30 ml/kg per the sepsis protocol  · A femoral double-lumen catheter was placed in the emergency department and an IV levophed drip/infusion was initiated to maintain a MAP of 65 mmHg and greater  · The IV levophed drip/infusion was increased to the maximum dose, so an IV vasopressin drip/infusion was then initiated  · Also with a lactic acidosis  · Check blood cultures x 2 sets  · Check a urinalysis and urine culture  · Check stool cultures including a Clostridium difficile culture, a Rotavirus stool antigen test, a stool culture for enteric bacterial pathogens, and stool for fecal leukocytes  · Check and follow the procalcitonin level  · COVID-19 testing was negative  · Treat with broad-spectrum antibiotics including IV cefepime, IV metronidazole, and IV vancomycin  · The patient was seen in consultation by General Surgery  · The patient was seen in consultation by Dr Idania Christianson (Pulmonology/Critical Care Medicine), who recommended that the patient be transferred to a higher level of care septic shock management with the patient now requiring 2 IV vasopressor agents to maintain an adequate MAP  The patient will be transferred to the service of Dr Idania Christianson (Pulmonology/Critical Care Medicine) in the ICU at [address] [place] [address]

## 2021-06-18 NOTE — ASSESSMENT & PLAN NOTE
· Evaluated by surgery and no indication for surgical intervention at this time  · Continue IV fluids

## 2021-06-18 NOTE — UTILIZATION REVIEW
Initial Clinical Review    Admission: Date/Time/Statement:   Admission Orders (From admission, onward)     Ordered        06/17/21 1223  Inpatient Admission  Once                   Orders Placed This Encounter   Procedures    Inpatient Admission     Standing Status:   Standing     Number of Occurrences:   1     Order Specific Question:   Level of Care     Answer:   Critical Care [15]     Order Specific Question:   Estimated length of stay     Answer:   More than 2 Midnights     Order Specific Question:   Certification     Answer:   I certify that inpatient services are medically necessary for this patient for a duration of greater than two midnights  See H&P and MD Progress Notes for additional information about the patient's course of treatment  ED Arrival Information     Expected Arrival Acuity    - 6/17/2021 10:33 Emergent         Means of arrival Escorted by Service Admission type    Ambulance [facility] Emergency         Arrival complaint    Vomiting;Diarrhea        Chief Complaint   Patient presents with    Vomiting     nausea vomiting diarrhea since yesterday  dizziness noted fell yesterday due to same complainining of back pain no blood thinners no loc       Initial Presentation:   62 yom to ER from home s/p fall, c/o posterior neck pain  Pt reports vomiting & diarrhea throughout the night, got up to BR, felt dizzy & fell  Symptoms started yesterday, admits to eating outside at the [employer], ate [employer]  After that started to have nausea, vomiting multiple times, non bloody,  Non bilious  He was not able to have any PO intake after that  Also with diffuse abdominal pain  He was found to be hypotensive with lactic acidosis of 5 5  After fluid resuscitation with about 5 L of normal saline, he was started on norepinephrine drip, central line was placed      CT chest abdomen and pelvis with contrast showed evidence of colitis at the sigmoid/ until descending colon  As well as cystitis  He was started on antibiotic, initially given Zosyn x1, then switched to vancomycin/cefepime /metronidazole  Per surg:  septic shock POA and CT scan showing colitis and cystitis    Plan:  -concerning abdominal examination, resuscitation in progress, consider evaluation with possible diagnostic laparoscopy if worsening endpoints of resuscitation   -follow-up Cbc differential, would favor intervention if concerning findings such as bandemia etc  -discussed with critical care medicine, decision made to transfer patient to [facility] prior to surgical evaluation, transfer process already in progress  -serial abdominal examinations   -Continue aggressive ICU resuscitation, wean vasopressors as able  -agree with broad-spectrum antibiotic coverage, presently on cefepime/vancomycin/Flagyl  -Follow-up results of C diff study, concern for possible C diff colitis in setting of sepsis and colitis  -Follow-up stool studies  -Continue insulin infusion for glycemic control    Continue Critical Care, pt will be transferred to [facility] given the critical care status, need for multiple pressors and in house critical care coverage    ED Triage Vitals   Temperature Pulse Respirations Blood Pressure SpO2   06/17/21 1036 06/17/21 1043 06/17/21 1043 06/17/21 1043 06/17/21 1043   (!) 96 7 °F (35 9 °C) 101 18 (!) 74/48 94 %      Temp Source Heart Rate Source Patient Position - Orthostatic VS BP Location FiO2 (%)   06/17/21 1036 06/17/21 1043 06/17/21 1043 06/17/21 1345 --   Temporal Monitor Lying Left arm       Pain Score       06/17/21 1430       7          Wt Readings from Last 1 Encounters:   06/18/21 80 8 kg (178 lb 2 1 oz)     Additional Vital Signs:   06/17/21 1429  --  115Abnormal   29Abnormal   65/45Abnormal   51  --  --  97 %  --  --   06/17/21 1345  --  106Abnormal   27Abnormal   79/53Abnormal   62  --  --  98 %  None (Room air)  Sitting   06/17/21 1330  --  104  25Abnormal   71/50Abnormal   --  --  --  98 %  --  Sitting   06/17/21 1328  --  105  22  69/49Abnormal   --  --  --  97 %  --  Sitting   06/17/21 1325  --  105  23Abnormal   69/49Abnormal   --  --  --  98 %  --  Sitting   06/17/21 1305  --  105  22  66/42Abnormal   --  --  --  99 %  --  --   06/17/21 1228  --  106Abnormal   22  75/50Abnormal   --  --  --  96 %  --  Sitting   06/17/21 1158  --  104  20  82/51Abnormal   --  --  --  97 %  --  Sitting   06/17/21 1128  --  99  22  79/52Abnormal   --  --  --  98 %  --  Sitting   06/17/21 1113  --  100  20  75/49Abnormal   --  --  --  98 %  --  Sitting   06/17/21 1058  --  101  20  81/51Abnormal   --  --  --  97 %  --  Lying   06/17/21 10:43:41  96 7 °F (35 9 °C)Abnormal   101  18  74/48Abnormal   --  --  --  94 %  --  Lying   06/17/21 1036  96 7 °F (35 9 °C)Abnormal   --  --  --  --  --  --  --  --  --     Pertinent Labs/Diagnostic Test Results:   Results from last 7 days   Lab Units 06/17/21  1120   SARS-COV-2  Negative     Results from last 7 days   Lab Units 06/18/21  0511 06/17/21  1727 06/17/21  1045   WBC Thousand/uL 12 81* 9 19 9 97   HEMOGLOBIN g/dL 15 1 14 0 15 3   HEMATOCRIT % 42 8 40 8 44 6   PLATELETS Thousands/uL 218 181 200   BANDS PCT %  --  3  --      Results from last 7 days   Lab Units 06/18/21  0330 06/17/21  1727 06/17/21  1437 06/17/21  1046   SODIUM mmol/L 144 141 137 130*   POTASSIUM mmol/L 3 6 3 5 3 9 4 0   CHLORIDE mmol/L 109* 104 103 92*   CO2 mmol/L 26 24 22 24   ANION GAP mmol/L 9 13 12 14*   BUN mg/dL 32* 34* 34* 40*   CREATININE mg/dL 1 39* 2 06* 2 35* 3 00*   EGFR ml/min/1 73sq m 55 34 29 22   CALCIUM mg/dL 7 7* 7 6* 8 5 9 5   CALCIUM, IONIZED mmol/L 1 06* 1 05*  --   --    MAGNESIUM mg/dL 2 1 2 0 2 3 2 7*   PHOSPHORUS mg/dL 2 9 2 0*  --   --      Results from last 7 days   Lab Units 06/18/21  0330 06/17/21  1727 06/17/21  1046   AST U/L 154* 58* 26   ALT U/L 186* 57 26   ALK PHOS U/L 68 68 101   TOTAL PROTEIN g/dL 5 6* 5 4* 6 4   ALBUMIN g/dL 2 2* 2 4* 3 0*   TOTAL BILIRUBIN mg/dL 0  86 0 83 1 12*     Results from last 7 days   Lab Units 06/18/21  1048 06/18/21  0930 06/18/21  0713 06/18/21  0550 06/18/21  0501 06/18/21  0355 06/18/21  0251 06/18/21  0153 06/18/21  0102 06/17/21  2358 06/17/21  2259 06/17/21  2211   POC GLUCOSE mg/dl 153* 158* 196* 268* 283* 178* 164* 193* 249* 261* 208* 213*     Results from last 7 days   Lab Units 06/18/21  0330 06/17/21  1727 06/17/21  1437 06/17/21  1046   GLUCOSE RANDOM mg/dL 192* 284* 444* 786*     Results from last 7 days   Lab Units 06/18/21  0315   HEMOGLOBIN A1C % 12 5*   EAG mg/dl 312     BETA-HYDROXYBUTYRATE   Date Value Ref Range Status   06/17/2021 0 4 <0 6 mmol/L Final      Results from last 7 days   Lab Units 06/17/21  1724   PH ART  7 402   PCO2 ART mm Hg 33 4*   PO2 ART mm Hg 76 6   HCO3 ART mmol/L 20 3*   BASE EXC ART mmol/L -3 6   O2 CONTENT ART mL/dL 18 5   O2 HGB, ARTERIAL % 93 0*   ABG SOURCE  Line, Arterial     Results from last 7 days   Lab Units 06/17/21  1126   PH AMANDA  7 274*   PCO2 AMANDA mm Hg 46 4   PO2 AMANDA mm Hg 36 6   HCO3 AMANDA mmol/L 21 0*   BASE EXC AMANDA mmol/L -5 8   O2 CONTENT AMANDA ml/dL 11 4   O2 HGB, VENOUS % 61 0     Results from last 7 days   Lab Units 06/18/21  0330 06/17/21  1046   CK TOTAL U/L 52 53     Results from last 7 days   Lab Units 06/18/21  0310 06/17/21  1437 06/17/21  1045   TROPONIN I ng/mL <0 02 <0 02 <0 02     Results from last 7 days   Lab Units 06/18/21  0310   PROCALCITONIN ng/ml 29 74*     Results from last 7 days   Lab Units 06/18/21  0329 06/17/21  1727 06/17/21  1437 06/17/21  1126   LACTIC ACID mmol/L 2 0 4 8* 6 4* 5 5*     Results from last 7 days   Lab Units 06/17/21  1046   LIPASE u/L 63*     Results from last 7 days   Lab Units 06/17/21  1430 06/17/21  1309   CLARITY UA  Clear Clear   COLOR UA  Yellow Orange   SPEC GRAV UA  <=1 005 <=1 005   PH UA  5 5 5 0   GLUCOSE UA mg/dl >=1000 (1%)* >=1000 (1%)*   KETONES UA mg/dl Negative Negative   BLOOD UA  Trace-Intact* Negative   PROTEIN UA mg/dl Trace* 30 (1+)*   NITRITE UA  Negative Negative   BILIRUBIN UA  Negative Negative   UROBILINOGEN UA E U /dl 0 2 0 2   LEUKOCYTES UA  Elevated glucose may cause decreased leukocyte values  See urine microscopic for [facility] result/* Elevated glucose may cause decreased leukocyte values  See urine microscopic for [facility] result/*   WBC UA /hpf 2-4 None Seen   RBC UA /hpf 2-4 None Seen   BACTERIA UA /hpf Occasional Occasional   EPITHELIAL CELLS WET PREP /hpf Occasional None Seen     Results from last 7 days   Lab Units 06/18/21  0947   ACETAMINOPHEN LVL ug/mL <2*     Results from last 7 days   Lab Units 06/17/21  2212 06/17/21  2144 06/17/21  1120 06/17/21  1046   BLOOD CULTURE  Received in Microbiology Lab  Culture in Progress  Received in Microbiology Lab  Culture in Progress  Received in Microbiology Lab  Culture in Progress  Received in Microbiology Lab  Culture in Progress  Results from last 7 days   Lab Units 06/17/21  1727   TOTAL COUNTED  100     6/17  Cxr=  No acute cardiopulmonary disease  Ct head=  1  Soft tissue density within the left globe, in the vitreous, may represent a dislocated lens  Additionally, there is a calcified structure adjacent to the left retina, posteriorly in the globe, of uncertain etiology  Recommend ophthalmology   consultation  2   No acute intracranial abnormality  CT cervical spine=  No cervical spine fracture or traumatic malalignment  CT chest a/p=  1  No evidence of trauma  2  Findings of colitis within the sigmoid and descending colon  3  Cystitis  KUB=  1  Nonspecific bowel gas pattern without acute abnormality noted  2   Nasogastric tube terminates in the stomach  3   Right common femoral venous catheter as noted    Ekg=  Sinus tachycardia    ED Treatment:   Medication Administration from 06/17/2021 1033 to 06/17/2021 1400       Date/Time Order Dose Route Action     06/17/2021 1115 sodium chloride 0 9 % infusion 2,000 mL/hr Intravenous New Bag     06/17/2021 1301 sodium chloride 0 9 % infusion 500 mL/hr Intravenous New Bag     06/17/2021 1118 iohexol (OMNIPAQUE) 350 MG/ML injection (SINGLE-DOSE) 100 mL 100 mL Intravenous Given     06/17/2021 1203 insulin regular (HumuLIN R,NovoLIN R) injection 10 Units 10 Units Subcutaneous Given     06/17/2021 1147 sodium chloride 0 9 % bolus 2,000 mL 2,000 mL Intravenous New Bag     06/17/2021 1200 sodium chloride 0 9 % bolus 1,000 mL 1,000 mL Intravenous New Bag     06/17/2021 1215 piperacillin-tazobactam (ZOSYN) 3 375 g in sodium chloride 0 9 % 100 mL IVPB 3 375 g Intravenous New Bag     06/17/2021 1348 insulin regular (HumuLIN R,NovoLIN R) 1 Units/mL in sodium chloride 0 9 % 100 mL infusion 10 Units/hr Intravenous New Bag     06/17/2021 1349 norepinephrine (LEVOPHED) 4 mg (STANDARD CONCENTRATION) IV in sodium chloride 0 9% 250 mL 8 mcg/min Intravenous Rate/Dose Change     06/17/2021 1337 norepinephrine (LEVOPHED) 4 mg (STANDARD CONCENTRATION) IV in sodium chloride 0 9% 250 mL 7 mcg/min Intravenous Rate/Dose Change     06/17/2021 1327 norepinephrine (LEVOPHED) 4 mg (STANDARD CONCENTRATION) IV in sodium chloride 0 9% 250 mL 6 mcg/min Intravenous Rate/Dose Change     06/17/2021 1317 norepinephrine (LEVOPHED) 4 mg (STANDARD CONCENTRATION) IV in sodium chloride 0 9% 250 mL 5 mcg/min Intravenous New Bag        Past Medical History:   Diagnosis Date    Allergic rhinitis     Anxiety     Depression     Diabetes ([facility] [address] )     Hypertension     PTSD (post-traumatic stress disorder)     Sleep difficulties      Admitting Diagnosis: Lactic acidosis [E87 2]  Colitis [K52 9]  Vomiting [R11 10]  Cystitis [N30 90]  Hypotension [I95 9]  Fall [W19  XXXA]  Hyperglycemia [R73 9]  Acute kidney injury ([facility] [address] ) [N17 9]  Severe sepsis with septic shock ([facility] [address] ) [A41 9, R65 21]  Age/Sex: 62 y o  male  Admission Orders:  Consult pulmonary  Consult surgery  accuchecks  cont pulse ox  Consult IR  scd  A-line care & monitoring    Scheduled Medications:  ciprofloxacin (CIPRO) IVPB (premix in 5% dextrose) 400 mg 200 mL q12h  metroNIDAZOLE (FLAGYL) IVPB (premix) 500 mg 100 mL q8h  sodium bicarbonate 8 4 % injection 50 mEq x 1  vancomycin (VANCOCIN) 1,500 mg in sodium chloride 0 9 % 500 mL IVPB  Dose: 20 mg/kg Weight Dosing Info: 72 3 kg (Adjusted), daily  vancomycin (VANCOCIN) oral solution 500 mg q6h    Continuous IV Infusions:  insulin regular (HumuLIN R,NovoLIN R) 1 Units/mL in sodium chloride 0 9 % 100 mL infusion   Rate: 0 3-21 mL/hr Dose: 0 3-21 Units/hr  Freq: Titrated Route: IV  Last Dose: 9 Units/hr (06/17/21 1848)  Start: 06/17/21 1230 End: 06/17/21 2038  multi-electrolyte (ISOLYTE-S PH 7 4 equivalent) IV solution   Rate: 250 mL/hr Dose: 250 mL/hr  Freq: Continuous Route: IV  Last Dose: 125 mL/hr (06/17/21 1813)  Start: 06/17/21 1600 End: 06/17/21 2038  norepinephrine (LEVOPHED) 4 mg (STANDARD CONCENTRATION) IV in sodium chloride 0 9% 250 mL   Rate: 18 8 mL/hr Dose: 5 mcg/min  Freq: Titrated Route: IV  Last Dose: 26 mcg/min (06/17/21 1819)  Start: 06/17/21 1315 End: 06/17/21 2038  vasopressin (PITRESSIN) 20 Units in sodium chloride 0 9 % 100 mL infusion   Rate: 12 mL/hr Dose: 0 04 Units/min  Freq: Continuous Route: IV  Last Dose: Stopped (06/17/21 1715)  Start: 06/17/21 1530 End: 06/17/21 2038    PRN Meds:  ondansetron (ZOFRAN) injection 4 mg   Dose: 4 mg-used x1    Network Utilization Review Department  ATTENTION: Please call with any questions or concerns to [phone] and carefully listen to the prompts so that you are directed to the right person  All voicemails are confidential   Dustin Jasmine all requests for admission clinical reviews, approved or denied determinations and any other requests to dedicated fax number below belonging to the campus where the patient is receiving treatment   List of dedicated fax numbers for the Facilities:  FACILITY NAME UR FAX NUMBER   ADMISSION DENIALS (Administrative/Medical Necessity) [phone]   [address] (Maternity/NICU/Pediatrics) [address] [facility],[address] Floor [address] [place] [facility] [address] [id number] [place] [address] Jamshid Pickering [facility] [address] [place] [address] [phone]

## 2021-06-18 NOTE — ASSESSMENT & PLAN NOTE
· Chronic right knee pain with history of multiple right knee surgeries  · Followed by Dr Mary Ann David (Orthopedic Surgery) as an outpatient

## 2021-06-18 NOTE — UTILIZATION REVIEW
Inpatient Admission Authorization Request   NOTIFICATION OF INPATIENT ADMISSION/INPATIENT AUTHORIZATION REQUEST   SERVICING FACILITY:   [address] [place] [address], [facility], [facility]  Tax ID: [id number]  NPI: [id number]  Place of Service: Inpatient [address] [facility]  [address]  Place of Service Code: 24     ATTENDING PROVIDER:  Attending Name and NPI#: Grace Chun Md [[id number]]  Address: [address] [place] [address], [facility]  Phone: [phone]     UTILIZATION REVIEW CONTACT:  Akilah Metzger Utilization   Network Utilization Review Department  Phone: [phone]  Fax: [phone]  Email: [email or web address]     PHYSICIAN ADVISORY SERVICES:  FOR [id number] REVIEW - MEDICAL NECESSITY DENIAL  Phone: [phone]  Fax: [phone]  Email: [email or web address]     TYPE OF REQUEST:  Inpatient Status     ADMISSION INFORMATION:  ADMISSION DATE/TIME: 6/17/21  8:38 PM  PATIENT DIAGNOSIS CODE/DESCRIPTION:  Severe sepsis with septic shock [R65 21]  DISCHARGE DATE/TIME: No discharge date for patient encounter  DISCHARGE DISPOSITION (IF DISCHARGED): [address] [facility]     IMPORTANT INFORMATION:  Please contact the Akilah Metzger directly with any questions or concerns regarding this request  Department voicemails are confidential     Send requests for admission clinical reviews, concurrent reviews, approvals, and administrative denials due to lack of clinical to fax [phone]

## 2021-06-18 NOTE — ASSESSMENT & PLAN NOTE
Lab Results   Component Value Date    HGBA1C 11 3 (H) 09/18/2018       Recent Labs     06/18/21  0355 06/18/21  0501 06/18/21  0550 06/18/21  0713   POCGLU 178* 283* 268* 196*       Blood Sugar Average: Last 72 hrs:  (P) 220   · On Lantus 18u HS, metformin  · Hold home meds for now  · Continue insulin infusion, accuchecks

## 2021-06-18 NOTE — DISCHARGE SUMMARY
[address]  Discharge- Tessa Shallow 1960, 62 y o  male MRN: [id number]  Unit/Bed#:  Encounter: [id number]  Primary Care Provider: No primary care provider on file  Date and time admitted to hospital: 6/17/2021 10:33 AM    * Severe sepsis with septic shock [facility])  Assessment & Plan  · Severe sepsis with septic shock was present on admission and secondary to colitis in combination with acute cystitis  · Sirs criteria is met with tachycardia and tachypnea  · Persistent hypotension after the initial NSS IV fluid bolus of 30 ml/kg per the sepsis protocol  · A femoral double-lumen catheter was placed in the emergency department and an IV levophed drip/infusion was initiated to maintain a MAP of 65 mmHg and greater  · The IV levophed drip/infusion was increased to the maximum dose, so an IV vasopressin drip/infusion was then initiated  · Also with a lactic acidosis  · Check blood cultures x 2 sets  · Check a urinalysis and urine culture  · Check stool cultures including a Clostridium difficile culture, a Rotavirus stool antigen test, a stool culture for enteric bacterial pathogens, and stool for fecal leukocytes  · Check and follow the procalcitonin level  · COVID-19 testing was negative  · Treat with broad-spectrum antibiotics including IV cefepime, IV metronidazole, and IV vancomycin  · The patient was seen in consultation by General Surgery  · The patient was seen in consultation by Dr Lauren Berrios (Pulmonology/Critical Care Medicine), who recommended that the patient be transferred to a higher level of care septic shock management with the patient now requiring 2 IV vasopressor agents to maintain an adequate MAP  The patient will be transferred to the service of Dr Lauren Berrios (Pulmonology/Critical Care Medicine) in the ICU at [address] [place] [address]      Abnormal CT scan of head  Assessment & Plan  · Outpatient follow-up with Ophthalmology    CT scan of the head without contrast (06/17/2021): IMPRESSION:     1  Soft tissue density within the left globe, in the vitreous, may represent a dislocated lens  Additionally, there is a calcified structure adjacent to the left retina, posteriorly in the globe, of uncertain etiology  Recommend ophthalmology   consultation  2   No acute intracranial abnormality    Syncope and collapse  Assessment & Plan  · Likely secondary to hypotension  · Check a transthoracic echocardiogram    Right knee pain  Assessment & Plan  · Chronic right knee pain with history of multiple right knee surgeries  · Followed by Dr Sammy Llamas (Orthopedic Surgery) as an outpatient    Hypercalcemia  Assessment & Plan  · Check an ionized calcium level    Essential hypertension  Assessment & Plan  · Currently with hypotension secondary to septic shock  · Hold all hypertension medications at this time  · Follow the blood pressure trend closely in the ICU    Lactic acidosis  Assessment & Plan  · Secondary to severe sepsis with septic shock and metformin use    HHNC (hyperglycemic hyperosmolar nonketotic coma) [facility])  Assessment & Plan  Lab Results   Component Value Date    HGBA1C 11 3 (H) 09/18/2018       Recent Labs     06/17/21  1629 06/17/21  1748 06/17/21  1848 06/17/21  2115   POCGLU 328* 307* 262* 207*       Blood Sugar Average: Last 72 hrs:  (P) 365 4   · Does not appear to be in diabetic ketoacidosis  · Utilize an IV insulin drip/infusion per the non-DKA protocol  · Hold metformin for now  · Blood glucose monitoring every 1 hour    Acute cystitis without hematuria  Assessment & Plan  · Please see the plan for "Severe sepsis with septic shock"    Colitis  Assessment & Plan  · Please see the plan for "Severe sepsis with septic shock"        Discharging Physician / Practitioner: Chyna Walsh DO  PCP: No primary care provider on file    Admission Date:   Admission Orders (From admission, onward)     Ordered 06/17/21 1223  Inpatient Admission  Once                   Discharge Date/Transfer Date: 06/17/21    Consultations During Hospital Stay:  · Pulmonology/Critical Care Medicine  · General Surgery      Significant Findings / Test Results:   XR abdomen 1 view kub    Result Date: 6/17/2021  Impression: 1  Nonspecific bowel gas pattern without acute abnormality noted  2   Nasogastric tube terminates in the stomach  3   Right common femoral venous catheter as noted  Workstation performed: [id number]     TRAUMA - CT head wo contrast    Result Date: 6/17/2021  Impression: 1  Soft tissue density within the left globe, in the vitreous, may represent a dislocated lens  Additionally, there is a calcified structure adjacent to the left retina, posteriorly in the globe, of uncertain etiology  Recommend ophthalmology consultation  2   No acute intracranial abnormality I personally discussed this study  with Slava Gurrola on 6/17/2021 at 12:00 PM Workstation performed: [id number]     TRAUMA - CT spine cervical wo contrast    Result Date: 6/17/2021  Impression: No cervical spine fracture or traumatic malalignment  Workstation performed: [id number]     TRAUMA - CT chest abdomen pelvis w contrast    Result Date: 6/17/2021  Impression: 1  No evidence of trauma 2  Findings of colitis within the sigmoid and descending colon 3    Cystitis I personally discussed impression 1-3 with DMITRIY GEORGE on 6/17/2021 at 12:00 PM  Workstation performed: [id number]     ECG 12 lead  Order: [id number]  Status:  Final result   Visible to patient:  No (inaccessible in [address] [place] [address])   Next appt:  06/18/2021 at 07:00 AM in Cardiology Imaging ([facility] ECHO 1)   0 Result Notes   Ref Range & Units 6/17/21 1134   Ventricular Rate     Atrial Rate     AZ Interval ms 132    QRSD Interval ms 88    QT Interval ms 372    QTC Interval ms 482    P Axis degrees 66    QRS Axis degrees 58    T Wave Axis degrees 33       Narrative & Impression    Sinus tachycardia  Otherwise normal ECG  When compared with ECG of 18-SEP-2018 09:59,  Premature ventricular complexes are no longer Present  Confirmed by Basim Caicedo ([id number]) on 6/17/2021 7:46:54 PM      Specimen Collected: 06/17/21 11:34   Last Resulted: 06/17/21 19:46           Results from last 7 days   Lab Units 06/17/21  1727 06/17/21  1045   WBC Thousand/uL 9 19 9 97   HEMOGLOBIN g/dL 14 0 15 3   HEMATOCRIT % 40 8 44 6   PLATELETS Thousands/uL 181 200     Results from last 7 days   Lab Units 06/17/21  1727 06/17/21  1437 06/17/21  1046   SODIUM mmol/L 141 137 130*   POTASSIUM mmol/L 3 5 3 9 4 0   CHLORIDE mmol/L 104 103 92*   CO2 mmol/L 24 22 24   BUN mg/dL 34* 34* 40*   CREATININE mg/dL 2 06* 2 35* 3 00*   CALCIUM mg/dL 7 6* 8 5 9 5     Results from last 7 days   Lab Units 06/17/21  1727 06/17/21  1437 06/17/21  1046   MAGNESIUM mg/dL 2 0 2 3 2 7*     Results from last 7 days   Lab Units 06/17/21  1727 06/17/21  1046   ALK PHOS U/L 68 101   ALT U/L 57 26   AST U/L 58* 26     Microbiology Results (last 21 days)    Collected Updated Procedure Result Status Patient Facility Result Comment    06/17/2021 1633 06/17/2021 1647 Occult blood 1-3, stool [[id number]]   Stool from Rectum    In process [address] [place] [address] [address]  Component Value   No component results           06/17/2021 1554 06/17/2021 1607 Clostridium difficile toxin by PCR with EIA [[id number]]   Stool from Per Rectum    In process [address] [place] [address] [address]  Component Value   No component results              06/17/2021 1554 06/17/2021 1607 Stool Enteric Bacterial Panel by PCR [[id number]]   Stool from Per Rectum    In process [address] [place] [address] [address]  Component Value   No component results              06/17/2021 1554 06/17/2021 1607 Fecal leukocytes [[id number]]   Stool from Per Rectum    In process [address] [place] [address] [address]  Component Value   No component results              06/17/2021 1554 06/17/2021 1607 Giardia lamblia, EIA and Ova and Parasites Examination [[id number]]   Stool from Rectum    In process [address]  Component Value   No component results              06/17/2021 1309 06/17/2021 1313 Urine culture [[id number]]   Urine, Indwelling Almanza Catheter    In process [address]  Component Value   No component results              06/17/2021 1120 06/17/2021 1802 Blood culture - 1st Set [[id number]]   Blood from Arm, Left    Preliminary result [address]  Component Value   Blood Culture Received in Microbiology Lab  Culture in Progress  P              06/17/2021 1120 06/17/2021 1226 Novel Coronavirus (Covid-19),PCR [facility] - 2 Hour Stat [[id number]]    Nares from Nose    Final result [address] The specimen collection materials, transport medium, and/or testing methodology utilized in the production of these test results have been proven to be reliable in a limited validation with an abbreviated program under the Emergency Utilization Authorization provided by the [facility] 6 reported as "Presumptive positive" will be confirmed with secondary testing to ensure result accuracy   Clinical caution and judgement should be used with the interpretation of these results with consideration of the clinical impression and other laboratory testing   Testing reported as "Positive" or "Negative" has been proven to be accurate according to standard laboratory validation requirements   All testing is performed with control materials showing appropriate reactivity at standard intervals  Component Value   SARS-CoV-2 Negative              06/17/2021 1046 06/17/2021 1802 Blood culture - 2nd set [[id number]]   Blood from Arm, Right    Preliminary result [address]  Component Value   Blood Culture Received in Microbiology Lab  Culture in Progress   P                  Complications:  None    Reason for Admission:  Severe sepsis with septic shock    Hospital Course:   Mahi Lr is a 62 y o  male patient who originally presented to the hospital on 6/17/2021 due to intractable abdominal pain, nausea, vomiting, and diarrhea  Please see above list of diagnoses and related plan for additional information  Condition at Discharge: critical    Discharge instructions/Information to patient and family:  See after visit summary for information provided to patient and family  Provisions for Follow-Up Care:  See after visit summary for information related to follow-up care and any pertinent home health orders  Disposition:  The patient was seen in consultation by Dr Lei Elizabeth (Pulmonology/Critical Care Medicine), who recommended that the patient be transferred to a higher level of care septic shock management with the patient now requiring 2 IV vasopressor agents to maintain an adequate MAP  The patient will be transferred to the service of Dr Lei Elizabeth (Pulmonology/Critical Care Medicine) in the ICU at [address] [place] [address]  Discharge Statement:  I spent 60 minutes discharging the patient  This time was spent on the day of discharge  I had direct contact with the patient on the day of discharge  Greater than 50% of the total time was spent examining patient, answering all patient questions, arranging and discussing plan of care with patient as well as directly providing post-discharge instructions  Additional time then spent on discharge activities  Discharge Medications:  See after visit summary for reconciled discharge medications provided to patient and/or family        **Please Note: This note may have been constructed using a voice recognition system**

## 2021-06-18 NOTE — ASSESSMENT & PLAN NOTE
· POA   Baseline appears to be around 0 7-0 9  · Presented with crea of 3  · Likely pre-renal, also in the setting of sepsis  · Continue IV fluids  · Avoid nephrotoxins  · Monitor I and O

## 2021-06-19 NOTE — PROGRESS NOTES
[address] [place] [address]  Progress Note - Trinity Norwood 1960, 62 y o  male MRN: [id number]  Unit/Bed#: ICU 01 Encounter: [id number]  Primary Care Provider: No primary care provider on file  Date and time admitted to hospital: 6/17/2021  8:38 PM    * Severe sepsis with septic shock [facility])  Assessment & Plan  · Secondary to gastroenteritis/colitis/cystitis  · Admitted with diarrhea and vomiting after eating pudding [place] a local [employer] [place]  · Stool studies for C diff, salmonella/shigella, Rotavirus negative  O&P and giardia pending  · Urine culture no growth  · CT scan c/a/p: 'Findings of colitis within the sigmoid and descending colon  Cystitis'  · Lactic acid normalized  · De-escalate antibiotics today  Current receiving IV cipro/flagyl, po Vancomycin   · Continue IV fluids  · Continue vasopressors, wean as able  Restarted levo overnight, currently at 4mcg/min  · BCx2 no growth at 24h    Colitis  Assessment & Plan  · Evaluated by surgery and no indication for surgical intervention at this time  · Continue supportive management  · Continue IV antibiotics  · Keep NPO  · Serial abdominal exam  Pt reports improvement in his abdominal distention thought it remains larger than his baseline    KAYLEE (acute kidney injury) ([facility] [address] )  Assessment & Plan  · POA  Baseline appears to be around 0 7-0 9  · Presented with crea of 3  Improved to 1 39 yesterday  AM bmp pending  · Likely pre-renal, also in the setting of sepsis  · Continue IV fluids  · Avoid nephrotoxins  · Monitor I and O    Type 2 diabetes mellitus [facility])  Assessment & Plan  Lab Results   Component Value Date    HGBA1C 12 5 (H) 06/18/2021       Recent Labs     06/18/21  1949 06/18/21  2209 06/18/21  2346 06/19/21  0149   POCGLU 177* 158* 156* 148*       Blood Sugar Average: Last 72 hrs:  (P) 161 [id number]   · On Lantus 18u HS, metformin  · Hold home meds for now  · Continue insulin infusion, accuchecks   BG's have been well controlled  Acute cystitis without hematuria  Assessment & Plan  · UA: WBC and RBC 2-4, with occasional bacteria  · Final Urine culture with <1000cfu/ml  · Discuss de-escalation of broad spectrum antibiotics on rounds today      Anxiety  Assessment & Plan  · Hold outpatient zoloft and trazadone    Essential hypertension  Assessment & Plan  · On lisinopril-HCTZ 10/12 5mg daily  · Hold home BP meds in setting of KAYLEE and hypotension  · Currently requiring vasopressors        ----------------------------------------------------------------------------------------  HPI/24hr events: Reports continued abdominal pain  Abdomen remains distended though improved from yesterday  Required addition of levo drip overnight 2/2 soft BP's  Calcium also repleted  No other acute changes overnight  Disposition: Continue Critical Care   Code Status: Level 1 - Full Code  ---------------------------------------------------------------------------------------  SUBJECTIVE  "I feel a little better but my stomach still hurts"    Review of Systems   Constitutional: Positive for fatigue  Negative for chills and fever  HENT: Negative  Eyes: Negative  Respiratory: Negative  Negative for cough, shortness of breath and wheezing  Cardiovascular: Negative for chest pain and palpitations  Gastrointestinal: Positive for abdominal distention (improved from yesterday) and abdominal pain (generalized)  Negative for constipation and nausea  Endocrine: Negative  Genitourinary: Negative  Musculoskeletal: Negative  Skin: Negative  Neurological: Negative  Psychiatric/Behavioral: Negative  All other systems reviewed and are negative      Review of systems was reviewed and negative unless stated above in HPI/24-hour events   ---------------------------------------------------------------------------------------  OBJECTIVE    Vitals   Vitals:    06/19/21 0000 06/19/21 0100 06/19/21 0200 06/19/21 0300   BP:       Pulse: (!) 116 (!) 114 (!) 110 (!) 110   Resp: (!) 30 (!) 28 (!) 27 (!) 25   Temp:       TempSrc:       SpO2: 94% 94% 93% 93%   Weight:       Height:         Temp (24hrs), Av °F (36 7 °C), Min:97 2 °F (36 2 °C), Max:100 °F (37 8 °C)  Current: Temperature: 100 °F (37 8 °C)  Arterial Line BP: 104/50  Arterial Line MAP (mmHg): 66 mmHg    Respiratory:  SpO2: SpO2: 93 %, SpO2 Activity:  , SpO2 Device: O2 Device: None (Room air)       Invasive/non-invasive ventilation settings   Respiratory    Lab Data (Last 4 hours)    None         O2/Vent Data (Last 4 hours)    None                Physical Exam  Vitals and nursing note reviewed  Constitutional:       General: He is not in acute distress  Appearance: He is obese  He is ill-appearing  He is not toxic-appearing or diaphoretic  HENT:      Head: Normocephalic and atraumatic  Nose: Nose normal       Mouth/Throat:      Mouth: Mucous membranes are dry  Eyes:      General: Lids are normal  No scleral icterus  Pupils: Pupils are equal, round, and reactive to light  Cardiovascular:      Rate and Rhythm: Regular rhythm  Tachycardia present  Pulses:           Radial pulses are 1+ on the right side and 1+ on the left side  Dorsalis pedis pulses are 1+ on the right side and 1+ on the left side  Heart sounds: Normal heart sounds  No murmur heard  Pulmonary:      Effort: Pulmonary effort is normal       Breath sounds: Normal breath sounds  No wheezing or rhonchi  Abdominal:      General: Bowel sounds are normal  There is distension  Tenderness: There is abdominal tenderness  Genitourinary:     Comments: Urinary catheter draining clear yellow urine  Musculoskeletal:      Cervical back: Neck supple  Right lower leg: No edema  Left lower leg: No edema  Skin:     General: Skin is warm and dry  Neurological:      General: No focal deficit present  Mental Status: He is alert and oriented to person, place, and time        GCS: GCS eye subscore is 4  GCS verbal subscore is 5  GCS motor subscore is 6  Psychiatric:         Mood and Affect: Mood normal          Behavior: Behavior is cooperative  Thought Content:  Thought content normal          Judgment: Judgment normal          Laboratory and Diagnostics:  Results from last 7 days   Lab Units 06/19/21  0314 06/18/21  0511 06/17/21  1727 06/17/21  1045   WBC Thousand/uL 8 84 12 81* 9 19 9 97   HEMOGLOBIN g/dL 12 9 15 1 14 0 15 3   HEMATOCRIT % 37 3 42 8 40 8 44 6   PLATELETS Thousands/uL 166 218 181 200   BANDS PCT %  --   --  3  --    MONO PCT %  --   --  4  --      Results from last 7 days   Lab Units 06/18/21  0330 06/17/21  1727 06/17/21  1437 06/17/21  1046   SODIUM mmol/L 144 141 137 130*   POTASSIUM mmol/L 3 6 3 5 3 9 4 0   CHLORIDE mmol/L 109* 104 103 92*   CO2 mmol/L 26 24 22 24   ANION GAP mmol/L 9 13 12 14*   BUN mg/dL 32* 34* 34* 40*   CREATININE mg/dL 1 39* 2 06* 2 35* 3 00*   CALCIUM mg/dL 7 7* 7 6* 8 5 9 5   GLUCOSE RANDOM mg/dL 192* 284* 444* 786*   ALT U/L 186* 57  --  26   AST U/L 154* 58*  --  26   ALK PHOS U/L 68 68  --  101   ALBUMIN g/dL 2 2* 2 4*  --  3 0*   TOTAL BILIRUBIN mg/dL 0 86 0 83  --  1 12*     Results from last 7 days   Lab Units 06/18/21  0330 06/17/21  1727 06/17/21  1437 06/17/21  1046   MAGNESIUM mg/dL 2 1 2 0 2 3 2 7*   PHOSPHORUS mg/dL 2 9 2 0*  --   --            Results from last 7 days   Lab Units 06/18/21  0310 06/17/21  1437 06/17/21  1045   TROPONIN I ng/mL <0 02 <0 02 <0 02     Results from last 7 days   Lab Units 06/18/21  0329 06/17/21  1727 06/17/21  1437 06/17/21  1126   LACTIC ACID mmol/L 2 0 4 8* 6 4* 5 5*     ABG:  Results from last 7 days   Lab Units 06/17/21  1724   PH ART  7 402   PCO2 ART mm Hg 33 4*   PO2 ART mm Hg 76 6   HCO3 ART mmol/L 20 3*   BASE EXC ART mmol/L -3 6   ABG SOURCE  Line, Arterial     VBG:  Results from last 7 days   Lab Units 06/17/21  1724 06/17/21  1126   PH AMANDA   --  7 274*   PCO2 AMANDA mm Hg  --  46 4   PO2 AMANDA mm Hg  --  36 6   HCO3 AMANDA mmol/L  --  21 0*   BASE EXC AMANDA mmol/L  --  -5 8   ABG SOURCE  Line, Arterial  --      Results from last 7 days   Lab Units 06/18/21  0310   PROCALCITONIN ng/ml 29 74*       Micro  Results from last 7 days   Lab Units 06/17/21  2212 06/17/21  2144 06/17/21  1554 06/17/21  1309 06/17/21  1120 06/17/21  1046   BLOOD CULTURE  Received in Microbiology Lab  Culture in Progress  Received in Microbiology Lab  Culture in Progress  --   --  No Growth at 24 hrs  No Growth at 24 hrs  URINE CULTURE   --   --   --  No Growth <1000 cfu/mL  --   --    C DIFF TOXIN B BY PCR   --   --  Negative  --   --   --        EKG: ST on monitor  Imaging: I have personally reviewed pertinent reports  and I have personally reviewed pertinent films in PACS    Intake and Output  I/O       06/17 0701 - 06/18 0700 06/18 0701 - 06/19 0700    I V  (mL/kg) 2060 2 (25 5) 3087 6 (38 2)    NG/     IV Piggyback 300 590    Total Intake(mL/kg) 2510 2 (31 1) 3677 6 (45 5)    Urine (mL/kg/hr) 1675 1335 (0 7)    Emesis/NG output 250 150    Stool  0    Total Output 1925 1485    Net +585 2 +2192 6          Unmeasured Stool Occurrence  3 x          Height and Weights   Height: 5' 6" (167 6 cm)  IBW (Ideal Body Weight): 63 8 kg  Body mass index is 28 75 kg/m²  Weight (last 2 days)     Date/Time   Weight    06/18/21 0536   80 8 (178 13)    06/17/21 2045   80 8 (178 13)                Nutrition       Diet Orders   (From admission, onward)             Start     Ordered    06/17/21 2058  Diet NPO  Diet effective now     Question Answer Comment   Diet Type NPO    RD to adjust diet per protocol?  Yes        06/17/21 2057                  Active Medications  Scheduled Meds:  Current Facility-Administered Medications   Medication Dose Route Frequency Provider Last Rate    cholecalciferol  1,000 Units Oral Daily LASHELL Chatterjee      ciprofloxacin  400 mg Intravenous Q12H LASHELL Chatterjee Stopped (06/18/21 1929)   Thelma Alford dextrose  25 mL Intravenous Daily PRN Wilhemena Pucker, CRNP      heparin (porcine)  5,000 Units Subcutaneous [facility] Wilhemena Pucker, CRNP      HYDROmorphone  0 5 mg Intravenous Q3H PRN Wilhemena Pucker, CRNP      insulin regular (HumuLIN R,NovoLIN R) infusion  0 3-21 Units/hr Intravenous Titrated Wilhemena Pucker, CRNP 4 Units/hr (06/18/21 1815)    metroNIDAZOLE  500 mg Intravenous Q8H Gena GarcíaHAYELYNP Stopped (06/19/21 0200)    multi-electrolyte  125 mL/hr Intravenous Continuous Wilhemena Pucker, CRNP 125 mL/hr (06/19/21 0317)    norepinephrine  1-30 mcg/min Intravenous Titrated Wilhemena Pucker, CRNP 4 mcg/min (06/18/21 2355)    ondansetron  4 mg Intravenous Q6H PRN Wilhemena Pucker, CRNP      sodium chloride (PF)  3 mL Intravenous Q1H PRN Wilhemena Pucker, CRNP      vancomycin  500 mg Oral Q6H [address] [place]a [place], CRNP      vasopressin  0 04 Units/min Intravenous Continuous Wilhemena Pucker, CRNP 0 04 Units/min (06/18/21 1927)     Continuous Infusions:  insulin regular (HumuLIN R,NovoLIN R) infusion, 0 3-21 Units/hr, Last Rate: 4 Units/hr (06/18/21 1815)  multi-electrolyte, 125 mL/hr, Last Rate: 125 mL/hr (06/19/21 0317)  norepinephrine, 1-30 mcg/min, Last Rate: 4 mcg/min (06/18/21 2355)  vasopressin, 0 04 Units/min, Last Rate: 0 04 Units/min (06/18/21 1927)      PRN Meds:   dextrose, 25 mL, Daily PRN  HYDROmorphone, 0 5 mg, Q3H PRN  ondansetron, 4 mg, Q6H PRN  sodium chloride (PF), 3 mL, Q1H PRN        Invasive Devices Review  Invasive Devices     Central Venous Catheter Line            CVC Central Lines 06/17/21 Triple Right Femoral 1 day          Peripheral Intravenous Line            Peripheral IV Distal;Left;Upper;Ventral (anterior) Arm -- days    Peripheral IV 06/17/21 Right Antecubital 1 day          Arterial Line            Arterial Line 06/17/21 Right Radial 1 day          Drain            NG/OG/Enteral Tube Nasogastric 18 Fr Right nares 1 day    Urethral Catheter Non-latex; Double-lumen 16 Fr  1 day Rationale for remaining devices: Critical illness  Consider placement of IJ TLC today if continued on vasopressors  Currently with Fem TLC    ---------------------------------------------------------------------------------------  Advance Directive and Living Will:      Power of :    POLST:    ---------------------------------------------------------------------------------------  Care Time Delivered:   No Critical Care time spent       [facility]      Portions of the record may have been created with voice recognition software  Occasional wrong word or "sound a like" substitutions may have occurred due to the inherent limitations of voice recognition software    Read the chart carefully and recognize, using context, where substitutions have occurred

## 2021-06-19 NOTE — ASSESSMENT & PLAN NOTE
· Secondary to gastroenteritis/colitis/cystitis  · Admitted with diarrhea and vomiting after eating pudding [place] local farmers [place]  · Stool studies for C diff, salmonella/shigella, Rotavirus negative  O&P and giardia pending  · Urine culture no growth  · CT scan c/a/p: 'Findings of colitis within the sigmoid and descending colon  Cystitis'  · Lactic acid normalized  · De-escalate antibiotics today  Current receiving IV cipro/flagyl, po Vancomycin   · Continue IV fluids  · Continue vasopressors, wean as able   Restarted levo overnight, currently at 4mcg/min  · BCx2 no growth at 24h

## 2021-06-19 NOTE — ASSESSMENT & PLAN NOTE
· On lisinopril-HCTZ 10/12 5mg daily  · Hold home BP meds in setting of KAYLEE and hypotension  · Currently requiring vasopressors

## 2021-06-19 NOTE — ASSESSMENT & PLAN NOTE
Lab Results   Component Value Date    HGBA1C 12 5 (H) 06/18/2021       Recent Labs     06/18/21  1949 06/18/21  2209 06/18/21  2346 06/19/21  0149   POCGLU 177* 158* 156* 148*       Blood Sugar Average: Last 72 hrs:  (P) 370 [id number]   · On Lantus 18u HS, metformin  · Hold home meds for now  · Continue insulin infusion, accuchecks  BG's have been well controlled

## 2021-06-19 NOTE — ASSESSMENT & PLAN NOTE
· POA  Baseline appears to be around 0 7-0 9  · Presented with crea of 3  Improved to 1 39 yesterday  AM bmp pending    · Likely pre-renal, also in the setting of sepsis  · Continue IV fluids  · Avoid nephrotoxins  · Monitor I and O

## 2021-06-19 NOTE — ASSESSMENT & PLAN NOTE
· UA: WBC and RBC 2-4, with occasional bacteria    · Final Urine culture with <1000cfu/ml  · Discuss de-escalation of broad spectrum antibiotics on rounds today

## 2021-06-19 NOTE — CONSULTS
Consultation - General Surgery  Bianca Castillo 62 y o  male MRN: [id number]  Unit/Bed#: ICU 01 Encounter: [id number]        Assessment/Plan     Assessment:  60M w/ sepsis likely secondary to gastroenteritis, colitis, cystitis    Plan:  No acute surgical intervention at this time  Agree with continuing nasogastric tube however patient is now passing flatus and having diarrhea, it continues possible discontinue nasogastric tube tomorrow and starting on clears    Agree fluid resuscitation    Following endpoints  C diff negative agree with continuing Cipro and Flagyl    Continue monitor abdominal exam  Care per primary    History of Present Illness     HPI:  Bianca Castillo is a 62 y o  male who presents to the emergency department at [facility] with complaints of intractable abdominal pain, nausea vomiting and diarrhea  Patient developed this acutely with severe generalized abdominal pain on the evening of 06/16/2021  Patient had multiple episodes of nausea emesis and diarrhea  He did experience multiple falls overnight into the morning  Patient states that he had some [place] to at a local [employer] market for the symptoms happened He denies any hematemesis, melena or hematochezia  Patient was found to be hypotensive in the emergency department a lactic acidosis of 5 5  He was appropriately fluid resuscitated with about 5 L normal saline and then started on vasopressor medication  It was decided to transfer the patient to [facility] at bedtime for higher level of care  Review of Systems   Constitutional: Positive for chills and fever  HENT: Negative  Eyes: Negative  Respiratory: Negative  Cardiovascular: Negative  Gastrointestinal: Positive for abdominal distention, abdominal pain, diarrhea, nausea and vomiting  Endocrine: Negative  Genitourinary: Negative  Musculoskeletal: Negative  Skin: Negative  Allergic/Immunologic: Negative      Neurological: Positive for dizziness, syncope and weakness  Hematological: Negative  Psychiatric/Behavioral: Negative  Historical Information   Past Medical History:   Diagnosis Date    Allergic rhinitis     Anxiety     Depression     Diabetes ([address] )     Hypertension     PTSD (post-traumatic stress disorder)     Sleep difficulties      Past Surgical History:   Procedure Laterality Date    INGUINAL HERNIA REPAIR      KNEE SURGERY      RETINAL DETACHMENT SURGERY      SHOULDER ARTHROSCOPY       Social History   Social History     Substance and Sexual Activity   Alcohol Use Not Currently     Social History     Substance and Sexual Activity   Drug Use No     Social History     Tobacco Use   Smoking Status Former Smoker    Packs/day: 0 25    Years: 5 00    Pack years: 1 25    Types: Cigarettes    Quit date: 1988    Years since quittin 7   Smokeless Tobacco Former User    Quit date: 1988   Tobacco Comment    patient quit smoking 30 yrs ago     Family History:   Family History   Problem Relation Age of Onset    Psychiatric Illness Father        Meds/Allergies   PTA meds:   Prior to Admission Medications   Prescriptions Last Dose Informant Patient Reported? Taking?    cholecalciferol 1000 units tablet   No No   Sig: Take 1 tablet (1,000 Units total) by mouth daily Start after finishing Vitamin D2 17110F   cyanocobalamin 1,000 mcg/mL   No No   Sig: Inject 1 mL (1,000 mcg total) into a muscle every 30 (thirty) days   ergocalciferol (VITAMIN D2) 50,000 units   No No   Sig: Take 1 capsule (50,000 Units total) by mouth once a week for 13 doses   insulin glargine (LANTUS) 100 units/mL subcutaneous injection   No No   Sig: Inject 18 Units under the skin daily at bedtime   Patient taking differently: Inject 50 Units under the skin daily at bedtime    lisinopril-hydrochlorothiazide (PRINZIDE,ZESTORETIC) 10-12 5 MG per tablet   No No   Sig: Take 1 tablet by mouth daily   loratadine (CLARITIN) 10 mg tablet   Yes No   Sig: Take 10 mg by mouth daily   metFORMIN (GLUCOPHAGE) 500 mg tablet   No No   Sig: Take 1 tablet (500 mg total) by mouth 2 (two) times a day with meals   Patient not taking: Reported on 6/17/2021   naproxen (NAPROSYN) 500 mg tablet   No No   Sig: Take 1 tablet (500 mg total) by mouth every 12 (twelve) hours as needed for moderate pain   Patient not taking: Reported on 6/17/2021   sertraline (ZOLOFT) 50 mg tablet   No No   Sig: Take 3 tablets (150 mg total) by mouth daily   traZODone (DESYREL) 150 mg tablet   No No   Sig: Take 1 tablet (150 mg total) by mouth daily at bedtime as needed for sleep   Patient not taking: Reported on 6/17/2021      Facility-Administered Medications: None     Allergies   Allergen Reactions    Amoxicillin GI Intolerance       Objective   First Vitals:   Blood Pressure: 112/54 (06/17/21 2100)  Pulse: (!) 132 (06/17/21 2045)  Temperature: 98 1 °F (36 7 °C) (06/17/21 2045)  Temp Source: Temporal (06/17/21 2045)  Respirations: (!) 29 (06/17/21 2045)  Height: 5' 6" (167 6 cm) (06/17/21 2045)  Weight - Scale: 80 8 kg (178 lb 2 1 oz) (06/17/21 2045)  SpO2: 97 % (06/17/21 2045)    Current Vitals:   Blood Pressure: 112/54 (06/17/21 2100)  Pulse: (!) 106 (06/19/21 1000)  Temperature: 99 9 °F (37 7 °C) (06/19/21 1050)  Temp Source: Temporal (06/19/21 1050)  Respirations: (!) 27 (06/19/21 1000)  Height: 5' 6" (167 6 cm) (06/19/21 0958)  Weight - Scale: 85 7 kg (188 lb 15 oz) (06/19/21 0545)  SpO2: 94 % (06/19/21 1000)      Intake/Output Summary (Last 24 hours) at 6/19/2021 1141  Last data filed at 6/19/2021 0801  Gross per 24 hour   Intake 3586 35 ml   Output 1610 ml   Net 1976 35 ml       Invasive Devices     Central Venous Catheter Line            CVC Central Lines 06/17/21 Triple Right Femoral 1 day          Peripheral Intravenous Line            Peripheral IV Distal;Left;Upper;Ventral (anterior) Arm -- days    Peripheral IV 06/17/21 Right Antecubital 2 days          Arterial Line            Arterial Line 06/17/21 Right Radial 1 day          Drain            NG/OG/Enteral Tube Nasogastric 18 Fr Right nares 1 day    Urethral Catheter Non-latex; Double-lumen 16 Fr  1 day                Physical Exam  Constitutional:       General: He is not in acute distress  Appearance: He is ill-appearing  HENT:      Head: Normocephalic and atraumatic  Nose: Nose normal       Mouth/Throat:      Mouth: Mucous membranes are dry  Eyes:      General: No scleral icterus  Cardiovascular:      Rate and Rhythm: Tachycardia present  Pulmonary:      Effort: Pulmonary effort is normal    Abdominal:      General: There is distension  Palpations: Abdomen is soft  Tenderness: There is abdominal tenderness  There is no guarding  Comments: Tympany throughout    Mild tenderness in LLQ    NGT - bilious output   Genitourinary:     Comments: deferred  Musculoskeletal:         General: Normal range of motion  Cervical back: Neck supple  Skin:     General: Skin is warm  Neurological:      General: No focal deficit present  Mental Status: He is alert  Psychiatric:         Mood and Affect: Mood normal          Lab Results:   I have personally reviewed pertinent lab results  , CBC:   Lab Results   Component Value Date    WBC 8 84 06/19/2021    HGB 12 9 06/19/2021    HCT 37 3 06/19/2021    MCV 86 06/19/2021     06/19/2021    MCH 29 9 06/19/2021    MCHC 34 6 06/19/2021    RDW 14 3 06/19/2021    MPV 9 2 06/19/2021   , CMP:   Lab Results   Component Value Date    SODIUM 142 06/19/2021    K 2 9 (L) 06/19/2021     06/19/2021    CO2 26 06/19/2021    BUN 32 (H) 06/19/2021    CREATININE 1 19 06/19/2021    CALCIUM 7 3 (L) 06/19/2021    AST 67 (H) 06/19/2021    ALT 98 (H) 06/19/2021    ALKPHOS 54 06/19/2021    EGFR 66 06/19/2021     Imaging: I have personally reviewed pertinent reports     and I have personally reviewed pertinent films in PACS  EKG, Pathology, and Other Studies: I have personally reviewed pertinent reports     and I have personally reviewed pertinent films in PACS    Code Status: Level 1 - Full Code  Advance Directive and Living Will:      Power of :    POLST:

## 2021-06-19 NOTE — PLAN OF CARE
Problem: PAIN - ADULT  Goal: Verbalizes/displays adequate comfort level or baseline comfort level  Description: Interventions:  - Encourage patient to monitor pain and request assistance  - Assess pain using appropriate pain scale  - Administer analgesics based on type and severity of pain and evaluate response  - Implement non-pharmacological measures as appropriate and evaluate response  - Consider cultural and social influences on pain and pain management  - Notify physician/advanced practitioner if interventions unsuccessful or patient reports new pain  Outcome: Progressing     Problem: INFECTION - ADULT  Goal: Absence or prevention of progression during hospitalization  Description: INTERVENTIONS:  - Assess and monitor for signs and symptoms of infection  - Monitor lab/diagnostic results  - Monitor all insertion sites, i e  indwelling lines, tubes, and drains  - Monitor endotracheal if appropriate and nasal secretions for changes in amount and color  - [place] appropriate cooling/warming therapies per order  - Administer medications as ordered  - Instruct and encourage patient and family to use good hand hygiene technique  - Identify and instruct in appropriate isolation precautions for identified infection/condition  Outcome: Progressing  Goal: Absence of fever/infection during neutropenic period  Description: INTERVENTIONS:  - Monitor WBC    Outcome: Progressing     Problem: SAFETY ADULT  Goal: Patient will remain free of falls  Description: INTERVENTIONS:  - Educate patient/family on patient safety including physical limitations  - Instruct patient to call for assistance with activity   - Consult OT/PT to assist with strengthening/mobility   - Keep Call bell within reach  - Keep bed low and locked with side rails adjusted as appropriate  - Keep care items and personal belongings within reach  - Initiate and maintain comfort rounds  - Make Fall Risk Sign visible to staff  - Offer Toileting every 2 Hours, in advance of need  - Initiate/Maintain bed alarm  - Obtain necessary fall risk management equipment: yellow socks   - Apply yellow socks and bracelet for high fall risk patients  - Consider moving patient to room near nurses station  Outcome: Progressing  Goal: Maintain or return to baseline ADL function  Description: INTERVENTIONS:  -  Assess patient's ability to carry out ADLs; assess patient's baseline for ADL function and identify physical deficits which impact ability to perform ADLs (bathing, care of mouth/teeth, toileting, grooming, dressing, etc )  - Assess/evaluate cause of self-care deficits   - Assess range of motion  - Assess patient's mobility; develop plan if impaired  - Assess patient's need for assistive devices and provide as appropriate  - Encourage maximum independence but intervene and supervise when necessary  - Involve family in performance of ADLs  - Assess for home care needs following discharge   - Consider OT consult to assist with ADL evaluation and planning for discharge  - Provide patient education as appropriate  Outcome: Progressing  Goal: Maintains/Returns to pre admission functional level  Description: INTERVENTIONS:  - Perform BMAT or MOVE assessment daily    - Set and communicate daily mobility goal to care team and patient/family/caregiver  - Collaborate with rehabilitation services on mobility goals if consulted  - Perform Range of Motion 4 times a day  - Reposition patient every 2 hours    - Dangle patient 3 times a day  - Stand patient 3 times a day  - Ambulate patient 3 times a day  - Out of bed to chair 3 times a day   - Out of bed for meals 3  Problem: DISCHARGE PLANNING  Goal: Discharge to home or other facility with appropriate resources  Description: INTERVENTIONS:  - Identify barriers to discharge w/patient and caregiver  - Arrange for needed discharge resources and transportation as appropriate  - Identify discharge learning needs (meds, wound care, etc )  - Arrange for interpretive services to assist at discharge as needed  - Refer to Case Management Department for coordinating discharge planning if the patient needs post-hospital services based on physician/advanced practitioner order or complex needs related to functional status, cognitive ability, or social support system  Outcome: Progressing     Problem: Knowledge Deficit  Goal: Patient/family/caregiver demonstrates understanding of disease process, treatment plan, medications, and discharge instructions  Description: Complete learning assessment and assess knowledge base    Interventions:  - Provide teaching at level of understanding  - Provide teaching via preferred learning methods  Outcome: Progressing     Problem: GASTROINTESTINAL - ADULT  Goal: Minimal or absence of nausea and/or vomiting  Description: INTERVENTIONS:  - Administer IV fluids if ordered to ensure adequate hydration  - Maintain NPO status until nausea and vomiting are resolved  - Nasogastric tube if ordered  - Administer ordered antiemetic medications as needed  - Provide nonpharmacologic comfort measures as appropriate  - Advance diet as tolerated, if ordered  - Consider nutrition services referral to assist patient with adequate nutrition and appropriate food choices  Outcome: Progressing  Goal: Maintains or returns to baseline bowel function  Description: INTERVENTIONS:  - Assess bowel function  - Encourage oral fluids to ensure adequate hydration  - Administer IV fluids if ordered to ensure adequate hydration  - Administer ordered medications as needed  - Encourage mobilization and activity  - Consider nutritional services referral to assist patient with adequate nutrition and appropriate food choices  Outcome: Progressing  Goal: Maintains adequate nutritional intake  Description: INTERVENTIONS:  - Monitor percentage of each meal consumed  - Identify factors contributing to decreased intake, treat as appropriate  - Assist with meals as needed  - Monitor I&O, weight, and lab values if indicated  - Obtain nutrition services referral as needed  Outcome: Progressing  Goal: Establish and maintain optimal ostomy function  Description: INTERVENTIONS:  - Assess bowel function  - Encourage oral fluids to ensure adequate hydration  - Administer IV fluids if ordered to ensure adequate hydration   - Administer ordered medications as needed  - Encourage mobilization and activity  - Nutrition services referral to assist patient with appropriate food choices  - Assess stoma site  - Consider wound care consult   Outcome: Progressing  Goal: Oral mucous membranes remain intact  Description: INTERVENTIONS  - Assess oral mucosa and hygiene practices  - Implement preventative oral hygiene regimen  - Implement oral medicated treatments as ordered  - Initiate Nutrition services referral as needed  Outcome: Progressing     Problem: METABOLIC, FLUID AND ELECTROLYTES - ADULT  Goal: Electrolytes maintained within normal limits  Description: INTERVENTIONS:  - Monitor labs and assess patient for signs and symptoms of electrolyte imbalances  - Administer electrolyte replacement as ordered  - Monitor response to electrolyte replacements, including repeat lab results as appropriate  - Instruct patient on fluid and nutrition as appropriate  Outcome: Progressing  Goal: Fluid balance maintained  Description: INTERVENTIONS:  - Monitor labs   - Monitor I/O and WT  - Instruct patient on fluid and nutrition as appropriate  - Assess for signs & symptoms of volume excess or deficit  Outcome: Progressing  Goal: Glucose maintained within target range  Description: INTERVENTIONS:  - Monitor Blood Glucose as ordered  - Assess for signs and symptoms of hyperglycemia and hypoglycemia  - Administer ordered medications to maintain glucose within target range  - Assess nutritional intake and initiate nutrition service referral as needed  Outcome: Progressing     Problem: Prexisting or High Potential for Compromised Skin Integrity  Goal: Skin integrity is maintained or improved  Description: INTERVENTIONS:  - Identify patients at risk for skin breakdown  - Assess and monitor skin integrity  - Assess and monitor nutrition and hydration status  - Monitor labs   - Assess for incontinence   - Turn and reposition patient  - Assist with mobility/ambulation  - Relieve pressure over bony prominences  - Avoid friction and shearing  - Provide appropriate hygiene as needed including keeping skin clean and dry  - Evaluate need for skin moisturizer/barrier cream  - Collaborate with interdisciplinary team   - Patient/family teaching  - Consider wound care consult   Outcome: Progressing     Problem: Nutrition/Hydration-ADULT  Goal: Nutrient/Hydration intake appropriate for improving, restoring or maintaining nutritional needs  Description: Monitor and assess patient's nutrition/hydration status for malnutrition  Collaborate with interdisciplinary team and initiate plan and interventions as ordered  Monitor patient's weight and dietary intake as ordered or per policy  Utilize nutrition screening tool and intervene as necessary  Determine patient's food preferences and provide high-protein, high-caloric foods as appropriate       INTERVENTIONS:  - Monitor oral intake, urinary output, labs, and treatment plans  - Assess nutrition and hydration status and recommend course of action  - Evaluate amount of meals eaten  - Assist patient with eating if necessary   - Allow adequate time for meals  - Recommend/ encourage appropriate diets, oral nutritional supplements, and vitamin/mineral supplements  - Order, calculate, and assess calorie counts as needed  - Recommend, monitor, and adjust tube feedings and TPN/PPN based on assessed needs  - Assess need for intravenous fluids  - Provide specific nutrition/hydration education as appropriate  - Include patient/family/caregiver in decisions related to nutrition  Outcome: Progressing    times a day  - Out of bed for toileting  - Record patient progress and toleration of activity level   Outcome: Progressing

## 2021-06-19 NOTE — PLAN OF CARE
Problem: INFECTION - ADULT  Goal: Absence or prevention of progression during hospitalization  Description: INTERVENTIONS:  - Assess and monitor for signs and symptoms of infection  - Monitor lab/diagnostic results  - Monitor all insertion sites, i e  indwelling lines, tubes, and drains  - Monitor endotracheal if appropriate and nasal secretions for changes in amount and color  - [place] appropriate cooling/warming therapies per order  - Administer medications as ordered  - Instruct and encourage patient and family to use good hand hygiene technique  - Identify and instruct in appropriate isolation precautions for identified infection/condition  Outcome: Progressing  Goal: Absence of fever/infection during neutropenic period  Description: INTERVENTIONS:  - Monitor WBC    Outcome: Progressing

## 2021-06-19 NOTE — ASSESSMENT & PLAN NOTE
· Evaluated by surgery and no indication for surgical intervention at this time  · Continue supportive management  · Continue IV antibiotics  · Keep NPO  · Serial abdominal exam  Pt reports improvement in his abdominal distention thought it remains larger than his baseline

## 2021-06-20 NOTE — PLAN OF CARE
Problem: PAIN - ADULT  Goal: Verbalizes/displays adequate comfort level or baseline comfort level  Description: Interventions:  - Encourage patient to monitor pain and request assistance  - Assess pain using appropriate pain scale  - Administer analgesics based on type and severity of pain and evaluate response  - Implement non-pharmacological measures as appropriate and evaluate response  - Consider cultural and social influences on pain and pain management  - Notify physician/advanced practitioner if interventions unsuccessful or patient reports new pain  Outcome: Progressing     Problem: INFECTION - ADULT  Goal: Absence or prevention of progression during hospitalization  Description: INTERVENTIONS:  - Assess and monitor for signs and symptoms of infection  - Monitor lab/diagnostic results  - Monitor all insertion sites, i e  indwelling lines, tubes, and drains  - Monitor endotracheal if appropriate and nasal secretions for changes in amount and color  - [place] appropriate cooling/warming therapies per order  - Administer medications as ordered  - Instruct and encourage patient and family to use good hand hygiene technique  - Identify and instruct in appropriate isolation precautions for identified infection/condition  Outcome: Progressing  Goal: Absence of fever/infection during neutropenic period  Description: INTERVENTIONS:  - Monitor WBC    Outcome: Progressing     Problem: SAFETY ADULT  Goal: Patient will remain free of falls  Description: INTERVENTIONS:  - Educate patient/family on patient safety including physical limitations  - Instruct patient to call for assistance with activity   - Consult OT/PT to assist with strengthening/mobility   - Keep Call bell within reach  - Keep bed low and locked with side rails adjusted as appropriate  - Keep care items and personal belongings within reach  - Initiate and maintain comfort rounds  - Make Fall Risk Sign visible to staff  - Offer Toileting every Hours, in advance of need  - Initiate/Maintain alarm  - Obtain necessary fall risk management equipment:  - Apply yellow socks and bracelet for high fall risk patients  - Consider moving patient to room near nurses station  Outcome: Progressing  Goal: Maintain or return to baseline ADL function  Description: INTERVENTIONS:  -  Assess patient's ability to carry out ADLs; assess patient's baseline for ADL function and identify physical deficits which impact ability to perform ADLs (bathing, care of mouth/teeth, toileting, grooming, dressing, etc )  - Assess/evaluate cause of self-care deficits   - Assess range of motion  - Assess patient's mobility; develop plan if impaired  - Assess patient's need for assistive devices and provide as appropriate  - Encourage maximum independence but intervene and supervise when necessary  - Involve family in performance of ADLs  - Assess for home care needs following discharge   - Consider OT consult to assist with ADL evaluation and planning for discharge  - Provide patient education as appropriate  Outcome: Progressing  Problem: DISCHARGE PLANNING  Goal: Discharge to home or other facility with appropriate resources  Description: INTERVENTIONS:  - Identify barriers to discharge w/patient and caregiver  - Arrange for needed discharge resources and transportation as appropriate  - Identify discharge learning needs (meds, wound care, etc )  - Arrange for interpretive services to assist at discharge as needed  - Refer to Case Management Department for coordinating discharge planning if the patient needs post-hospital services based on physician/advanced practitioner order or complex needs related to functional status, cognitive ability, or social support system  Outcome: Progressing     Problem: Knowledge Deficit  Goal: Patient/family/caregiver demonstrates understanding of disease process, treatment plan, medications, and discharge instructions  Description: Complete learning assessment and assess knowledge base    Interventions:  - Provide teaching at level of understanding  - Provide teaching via preferred learning methods  Outcome: Progressing     Problem: GASTROINTESTINAL - ADULT  Goal: Minimal or absence of nausea and/or vomiting  Description: INTERVENTIONS:  - Administer IV fluids if ordered to ensure adequate hydration  - Maintain NPO status until nausea and vomiting are resolved  - Nasogastric tube if ordered  - Administer ordered antiemetic medications as needed  - Provide nonpharmacologic comfort measures as appropriate  - Advance diet as tolerated, if ordered  - Consider nutrition services referral to assist patient with adequate nutrition and appropriate food choices  Outcome: Progressing  Goal: Maintains or returns to baseline bowel function  Description: INTERVENTIONS:  - Assess bowel function  - Encourage oral fluids to ensure adequate hydration  - Administer IV fluids if ordered to ensure adequate hydration  - Administer ordered medications as needed  - Encourage mobilization and activity  - Consider nutritional services referral to assist patient with adequate nutrition and appropriate food choices  Outcome: Progressing  Goal: Maintains adequate nutritional intake  Description: INTERVENTIONS:  - Monitor percentage of each meal consumed  - Identify factors contributing to decreased intake, treat as appropriate  - Assist with meals as needed  - Monitor I&O, weight, and lab values if indicated  - Obtain nutrition services referral as needed  Outcome: Progressing  Goal: Establish and maintain optimal ostomy function  Description: INTERVENTIONS:  - Assess bowel function  - Encourage oral fluids to ensure adequate hydration  - Administer IV fluids if ordered to ensure adequate hydration   - Administer ordered medications as needed  - Encourage mobilization and activity  - Nutrition services referral to assist patient with appropriate food choices  - Assess stoma site  - Consider wound care consult   Outcome: Progressing  Goal: Oral mucous membranes remain intact  Description: INTERVENTIONS  - Assess oral mucosa and hygiene practices  - Implement preventative oral hygiene regimen  - Implement oral medicated treatments as ordered  - Initiate Nutrition services referral as needed  Outcome: Progressing     Problem: METABOLIC, FLUID AND ELECTROLYTES - ADULT  Goal: Electrolytes maintained within normal limits  Description: INTERVENTIONS:  - Monitor labs and assess patient for signs and symptoms of electrolyte imbalances  - Administer electrolyte replacement as ordered  - Monitor response to electrolyte replacements, including repeat lab results as appropriate  - Instruct patient on fluid and nutrition as appropriate  Outcome: Progressing  Goal: Fluid balance maintained  Description: INTERVENTIONS:  - Monitor labs   - Monitor I/O and WT  - Instruct patient on fluid and nutrition as appropriate  - Assess for signs & symptoms of volume excess or deficit  Outcome: Progressing  Goal: Glucose maintained within target range  Description: INTERVENTIONS:  - Monitor Blood Glucose as ordered  - Assess for signs and symptoms of hyperglycemia and hypoglycemia  - Administer ordered medications to maintain glucose within target range  - Assess nutritional intake and initiate nutrition service referral as needed  Outcome: Progressing

## 2021-06-20 NOTE — ASSESSMENT & PLAN NOTE
· Resolved  · POA  Baseline appears to be around 0 7-0 9  · Presented with crea of 3   Today back to baseline at 0 9  · Likely pre-renal, also in the setting of sepsis  · Continue IV fluids, adjust/plan to discontinue eventually once able to tolerate PO  · Avoid nephrotoxins  · Monitor I and O

## 2021-06-20 NOTE — ASSESSMENT & PLAN NOTE
Lab Results   Component Value Date    HGBA1C 12 5 (H) 06/18/2021       Recent Labs     06/19/21  2352 06/20/21  0207 06/20/21  0412 06/20/21  0558   POCGLU 110 110 105 116       Blood Sugar Average: Last 72 hrs:  (P) 163   · On Lantus 50u HS, metformin  · If restarting diet today, consider restarting Lantus at lower dose, adjust as PO intake improves  · SSI, accuchecks

## 2021-06-20 NOTE — ASSESSMENT & PLAN NOTE
· Secondary to gastroenteritis/colitis/cystitis  · Admitted with diarrhea and vomiting after eating [place] [employer]  · Stool studies for C diff, salmonella/shigella, Rotavirus negative  O&P and giardia pending  · Urine culture no growth  · CT scan c/a/p: 'Findings of colitis within the sigmoid and descending colon   Cystitis'  · Lactic acid normalized  · Off of pressors  · D4 abx: IV cipro/flagyl  · Continue IV fluids  · BCx2 no growth at 24h  · UC unremarkable

## 2021-06-20 NOTE — PROGRESS NOTES
[address]  Progress Note - Alyson Patient 1960, 62 y o  male MRN: [id number]  Unit/Bed#: ICU 01 Encounter: [id number]  Primary Care Provider: No primary care provider on file  Date and time admitted to hospital: 6/17/2021  8:38 PM    * Severe sepsis with septic shock [facility])  Assessment & Plan  · Secondary to gastroenteritis/colitis/cystitis  · Admitted with diarrhea and vomiting after eating pudding [place] a local [employer]  · Stool studies for C diff, salmonella/shigella, Rotavirus negative  O&P and giardia pending  · Urine culture no growth  · CT scan c/a/p: 'Findings of colitis within the sigmoid and descending colon  Cystitis'  · Lactic acid normalized  · Off of pressors  · D4 abx: IV cipro/flagyl  · Continue IV fluids  · BCx2 no growth at 24h  · UC unremarkable    Colitis  Assessment & Plan  · Evaluated by surgery and no indication for surgical intervention at this time  · Continue supportive management  · Continue IV antibiotics  · KUB: 'Unchanged proximal colonic distention which may represent ileus  Unchanged cecal enlargement'  · Plan for possible removal of NGT and start CLD today  · Surgery following    Acute cystitis without hematuria  Assessment & Plan  · UA: WBC and RBC 2-4, with occasional bacteria  · Final Urine culture with <1000cfu/ml      KAYLEE (acute kidney injury) ([facility] [address] )  Assessment & Plan  · Resolved  · POA  Baseline appears to be around 0 7-0 9  · Presented with crea of 3   Today back to baseline at 0 9  · Likely pre-renal, also in the setting of sepsis  · Continue IV fluids, adjust/plan to discontinue eventually once able to tolerate PO  · Avoid nephrotoxins  · Monitor I and O    Type 2 diabetes mellitus [facility])  Assessment & Plan  Lab Results   Component Value Date    HGBA1C 12 5 (H) 06/18/2021       Recent Labs     06/19/21  2352 06/20/21  0207 06/20/21  0412 06/20/21  0558   POCGLU 110 110 105 116       Blood Sugar Average: Last 72 hrs:  (P) 163   · On Lantus 50u HS, metformin  · If restarting diet today, consider restarting Lantus at lower dose, adjust as PO intake improves  · SSI, accuchecks    Essential hypertension  Assessment & Plan  · On lisinopril-HCTZ 10/12 5mg daily  · Hold home BP meds in setting of KAYLEE and hypotension  · Off of pressors  · Restart as pressures allow    Anxiety  Assessment & Plan  · Hold outpatient zoloft and trazadone    ----------------------------------------------------------------------------------------  HPI/24hr events: Evaluated by Surgery team yesterday, no need for surgical intervention for now  Off of pressors since last night  Disposition: Transfer to Med-Surg   Code Status: Level 1 - Full Code  ---------------------------------------------------------------------------------------  SUBJECTIVE  Feels okay  No worsening of abdominal pain  No other complaints endorsed  Review of Systems   Constitutional: Negative for chills, fatigue and fever  HENT: Negative for tinnitus  Eyes: Negative for pain and visual disturbance  Respiratory: Negative for cough and shortness of breath  Cardiovascular: Negative for chest pain  Gastrointestinal: Positive for abdominal distention  Negative for blood in stool  Genitourinary: Negative for dysuria and hematuria  Musculoskeletal: Negative for myalgias and neck pain  Skin: Negative for color change and pallor  Neurological: Negative for dizziness and light-headedness  Hematological: Negative for adenopathy  Psychiatric/Behavioral: Negative for confusion and hallucinations  All other systems reviewed and are negative      Review of systems was reviewed and negative unless stated above in HPI/24-hour events   ---------------------------------------------------------------------------------------  OBJECTIVE    Vitals   Vitals:    06/20/21 0400 06/20/21 0500 06/20/21 0555 06/20/21 0600   BP:       Pulse: 98 98  98   Resp: (!) 28 (!) 23  (!) 27   Temp:       [facility]: SpO2: 95% 94%  96%   Weight:   87 3 kg (192 lb 7 4 oz)    Height:         Temp (24hrs), Av 3 °F (37 4 °C), Min:98 5 °F (36 9 °C), Max:99 9 °F (37 7 °C)  Current: Temperature: 98 5 °F (36 9 °C)  Arterial Line BP: 132/70  Arterial Line MAP (mmHg): 92 mmHg    Respiratory:  SpO2: SpO2: 96 %, SpO2 Activity:  , SpO2 Device: O2 Device: None (Room air)       Invasive/non-invasive ventilation settings   Respiratory    Lab Data (Last 4 hours)    None         O2/Vent Data (Last 4 hours)    None                Physical Exam  Vitals and nursing note reviewed  Constitutional:       General: He is not in acute distress  HENT:      Head: Normocephalic and atraumatic  Nose: Nose normal       Mouth/Throat:      Mouth: Mucous membranes are dry  Pharynx: Oropharynx is clear  Eyes:      Conjunctiva/sclera: Conjunctivae normal       Pupils: Pupils are equal, round, and reactive to light  Cardiovascular:      Rate and Rhythm: Regular rhythm  Tachycardia present  Pulses: Normal pulses  Pulmonary:      Effort: Pulmonary effort is normal  No respiratory distress  Breath sounds: Normal breath sounds  Abdominal:      Comments: Hypoactive bowel sounds  No rebound, no guarding  (+) bruising R flank, no tenderness   Musculoskeletal:         General: No swelling  Normal range of motion  Cervical back: Normal range of motion  Skin:     General: Skin is warm and dry  Neurological:      General: No focal deficit present  Mental Status: He is alert and oriented to person, place, and time  Mental status is at baseline  Psychiatric:         Mood and Affect: Mood normal          Thought Content:  Thought content normal          Laboratory and Diagnostics:  Results from last 7 days   Lab Units 21  0307 21  0314 21  0511 21  1727 21  1045   WBC Thousand/uL 6 84 8 84 12 81* 9 19 9 97   HEMOGLOBIN g/dL 11 6* 12 9 15 1 14 0 15 3   HEMATOCRIT % 34 1* 37 3 42 8 40 8 44 6 PLATELETS Thousands/uL 154 166 218 181 200   BANDS PCT %  --   --   --  3  --    MONO PCT %  --   --   --  4  --      Results from last 7 days   Lab Units 06/20/21  0307 06/19/21 0329 06/18/21 0330 06/17/21 1727 06/17/21  1437 06/17/21  1046   SODIUM mmol/L 143 142 144 141 137 130*   POTASSIUM mmol/L 2 8* 2 9* 3 6 3 5 3 9 4 0   CHLORIDE mmol/L 108 106 109* 104 103 92*   CO2 mmol/L 28 26 26 24 22 24   ANION GAP mmol/L 7 10 9 13 12 14*   BUN mg/dL 28* 32* 32* 34* 34* 40*   CREATININE mg/dL 0 90 1 19 1 39* 2 06* 2 35* 3 00*   CALCIUM mg/dL 7 1* 7 3* 7 7* 7 6* 8 5 9 5   GLUCOSE RANDOM mg/dL 121 156* 192* 284* 444* 786*   ALT U/L  --  98* 186* 57  --  26   AST U/L  --  67* 154* 58*  --  26   ALK PHOS U/L  --  54 68 68  --  101   ALBUMIN g/dL  --  1 7* 2 2* 2 4*  --  3 0*   TOTAL BILIRUBIN mg/dL  --  0 81 0 86 0 83  --  1 12*     Results from last 7 days   Lab Units 06/20/21 0307 06/19/21 0329 06/18/21 0330 06/17/21 1727 06/17/21  1437 06/17/21  1046   MAGNESIUM mg/dL 2 3 2 1 2 1 2 0 2 3 2 7*   PHOSPHORUS mg/dL  --  2 5* 2 9 2 0*  --   --            Results from last 7 days   Lab Units 06/18/21  0310 06/17/21  1437 06/17/21  1045   TROPONIN I ng/mL <0 02 <0 02 <0 02     Results from last 7 days   Lab Units 06/18/21 0329 06/17/21 1727 06/17/21  1437 06/17/21  1126   LACTIC ACID mmol/L 2 0 4 8* 6 4* 5 5*     ABG:  Results from last 7 days   Lab Units 06/17/21  1724   PH ART  7 402   PCO2 ART mm Hg 33 4*   PO2 ART mm Hg 76 6   HCO3 ART mmol/L 20 3*   BASE EXC ART mmol/L -3 6   ABG SOURCE  Line, Arterial     VBG:  Results from last 7 days   Lab Units 06/17/21  1724 06/17/21  1126   PH AMANDA   --  7 274*   PCO2 AMANDA mm Hg  --  46 4   PO2 AMANDA mm Hg  --  36 6   HCO3 AMANDA mmol/L  --  21 0*   BASE EXC AMANDA mmol/L  --  -5 8   ABG SOURCE  Line, Arterial  --      Results from last 7 days   Lab Units 06/19/21  1424 06/18/21  0310   PROCALCITONIN ng/ml 22 95* 29 74*       Micro  Results from last 7 days   Lab Units 06/17/21  2212 06/17/21  2144 06/17/21  1554 06/17/21  1309 06/17/21  1120 06/17/21  1046   BLOOD CULTURE  No Growth at 48 hrs  No Growth at 48 hrs   --   --  No Growth at 48 hrs  No Growth at 48 hrs  URINE CULTURE   --   --   --  No Growth <1000 cfu/mL  --   --    C DIFF TOXIN B BY PCR   --   --  Negative  --   --   --        Imaging: I have personally reviewed pertinent reports  and I have personally reviewed pertinent films in PACS    Intake and Output  I/O       06/18 0701 - 06/19 0700 06/19 0701 - 06/20 0700    I V  (mL/kg) 3955 9 (46 2) 3120 1 (35 7)    IV Piggyback 790 404 2    Total Intake(mL/kg) 4745 9 (55 4) 3524 3 (40 4)    Urine (mL/kg/hr) 1535 (0 7) 1725 (0 8)    Emesis/NG output 150 550    Stool 0 0    Total Output 1685 2275    Net +3060 9 +1249 3          Unmeasured Stool Occurrence 3 x 2 x          Height and Weights   Height: 5' 6" (167 6 cm)  IBW (Ideal Body Weight): 63 8 kg  Body mass index is 31 06 kg/m²  Weight (last 2 days)     Date/Time   Weight    06/20/21 0555   87 3 (192 46)    06/19/21 0545   85 7 (188 93)    06/18/21 0536   80 8 (178 13)                Nutrition       Diet Orders   (From admission, onward)             Start     Ordered    06/17/21 2058  Diet NPO  Diet effective now     Question Answer Comment   Diet Type NPO    RD to adjust diet per protocol?  Yes        06/17/21 2057                  Active Medications  Scheduled Meds:  Current Facility-Administered Medications   Medication Dose Route Frequency Provider Last Rate    cholecalciferol  1,000 Units Oral Daily Renan Ashing, CRNP      ciprofloxacin  400 mg Intravenous Q12H Renan Ornelas CRNP 400 mg (06/20/21 3243)    dextrose  25 mL Intravenous Daily PRN Renan Ashing, CRNP      heparin (porcine)  5,000 Units Subcutaneous [facility] HAYLEY GonzalezNP      HYDROmorphone  0 5 mg Intravenous Q3H PRN HAYLEY GonzalezNP      insulin regular (HumuLIN R,NovoLIN R) infusion  0 3-21 Units/hr Intravenous Titrated LASHELL Gonzalez 0 5 Units/hr (06/20/21 0559)    metroNIDAZOLE  500 mg Intravenous Q8H Jackelin Iron, CRNP Stopped (06/20/21 0428)    multi-electrolyte  125 mL/hr Intravenous Continuous Jackelin Iron, CRNP 125 mL/hr (06/20/21 0314)    ondansetron  4 mg Intravenous Q6H PRN Jackelin Iron, CRNP      pantoprazole  40 mg Intravenous Q24H [address] Blanca Miller MD      potassium chloride  40 mEq Intravenous Once LASHELL Cantor      sodium chloride (PF)  3 mL Intravenous Q1H PRN Jackelin Iron, CRNP       Continuous Infusions:  insulin regular (HumuLIN R,NovoLIN R) infusion, 0 3-21 Units/hr, Last Rate: 0 5 Units/hr (06/20/21 0559)  multi-electrolyte, 125 mL/hr, Last Rate: 125 mL/hr (06/20/21 0314)      PRN Meds:   dextrose, 25 mL, Daily PRN  HYDROmorphone, 0 5 mg, Q3H PRN  ondansetron, 4 mg, Q6H PRN  sodium chloride (PF), 3 mL, Q1H PRN        Invasive Devices Review  Invasive Devices     Central Venous Catheter Line            CVC Central Lines 06/17/21 Triple Right Femoral 2 days          Peripheral Intravenous Line            Peripheral IV Distal;Left;Upper;Ventral (anterior) Arm -- days          Arterial Line            Arterial Line 06/17/21 Right Radial 2 days          Drain            NG/OG/Enteral Tube Nasogastric 18 Fr Right nares 2 days    Urethral Catheter Non-latex; Double-lumen 16 Fr  2 days                ---------------------------------------------------------------------------------------  Advance Directive and Living Will:      Power of :    POLST:    ---------------------------------------------------------------------------------------  Care Time Delivered:   Please refer to attending's note/attestation  Te Seymour MD      Portions of the record may have been created with voice recognition software  Occasional wrong word or "sound a like" substitutions may have occurred due to the inherent limitations of voice recognition software    Read the chart carefully and recognize, using context, where substitutions have occurred

## 2021-06-20 NOTE — ASSESSMENT & PLAN NOTE
· On lisinopril-HCTZ 10/12 5mg daily  · Hold home BP meds in setting of KAYLEE and hypotension  · Off of pressors  · Restart as pressures allow

## 2021-06-20 NOTE — ASSESSMENT & PLAN NOTE
· Evaluated by surgery and no indication for surgical intervention at this time  · Continue supportive management  · Continue IV antibiotics  · KUB: 'Unchanged proximal colonic distention which may represent ileus   Unchanged cecal enlargement'  · Plan for possible removal of NGT and start CLD today  · Surgery following

## 2021-06-20 NOTE — PROGRESS NOTES
Progress Note - General Surgery   Gillian Conner 62 y o  male MRN: [id number]  Unit/Bed#: ICU 01 Encounter: [id number]    Assessment:  60M w/ sepsis likely secondary to gastroenteritis, colitis, cystitis    Currently patient with continued ileus however improving    Softer abdomen today, still with flatus and diarrhea    Plan:  Care per primary  Recommend d/c NGT and place on clears   - I did discuss with the patient the need to ambulate as much as possible to make sure ileus improves    Has been off pressor medication for 24 hours  Subjective/Objective   Chief Complaint:     Subjective:  Patient has been off pressor medication for greater than 24 hours now  He is doing well  He is passing flatus and having diarrhea still  His abdomen does feel better to him however it is still distended but much improved from previous days  Objective:     Blood pressure 112/54, pulse 98, temperature 99 1 °F (37 3 °C), temperature source Temporal, resp  rate (!) 28, height 5' 6" (1 676 m), weight 87 3 kg (192 lb 7 4 oz), SpO2 95 %  ,Body mass index is 31 06 kg/m²  Intake/Output Summary (Last 24 hours) at 6/20/2021 0748  Last data filed at 6/20/2021 0600  Gross per 24 hour   Intake 3524 26 ml   Output 2275 ml   Net 1249 26 ml       Invasive Devices     Central Venous Catheter Line            CVC Central Lines 06/17/21 Triple Right Femoral 2 days          Peripheral Intravenous Line            Peripheral IV Distal;Left;Upper;Ventral (anterior) Arm -- days          Arterial Line            Arterial Line 06/17/21 Right Radial 2 days          Drain            NG/OG/Enteral Tube Nasogastric 18 Fr Right nares 2 days    Urethral Catheter Non-latex; Double-lumen 16 Fr  2 days                Physical Exam: General: AAOx3  Head: normocephalic, atraumatic  Neck: supple, trachea midline   Respiratory: BS b/l  Abdomen: Soft, distended, mild tenderness in LLQ  Heart: RRR, S1s2  Ext: Warm no cyanosis         Lab, Imaging and other studies:  I have personally reviewed pertinent lab results    , CBC:   Lab Results   Component Value Date    WBC 6 84 06/20/2021    HGB 11 6 (L) 06/20/2021    HCT 34 1 (L) 06/20/2021    MCV 88 06/20/2021     06/20/2021    MCH 30 1 06/20/2021    MCHC 34 0 06/20/2021    RDW 14 5 06/20/2021    MPV 9 0 06/20/2021   , CMP:   Lab Results   Component Value Date    SODIUM 143 06/20/2021    K 2 8 (L) 06/20/2021     06/20/2021    CO2 28 06/20/2021    BUN 28 (H) 06/20/2021    CREATININE 0 90 06/20/2021    CALCIUM 7 1 (L) 06/20/2021    EGFR 93 06/20/2021     VTE Pharmacologic Prophylaxis: Heparin  VTE Mechanical Prophylaxis: sequential compression device

## 2021-06-21 NOTE — PLAN OF CARE
Problem: PHYSICAL THERAPY ADULT  Goal: Performs mobility at highest level of function for planned discharge setting  See evaluation for individualized goals  Description: Treatment/Interventions: Functional transfer training, LE strengthening/ROM, Elevations, Therapeutic exercise, Endurance training, Patient/family training, Equipment eval/education, Bed mobility, Gait training, Compensatory technique education, Spoke to nursing, Spoke to case management, OT  Equipment Recommended: [employer]       See flowsheet documentation for full assessment, interventions and recommendations  6/21/2021 1307 by Violetta Childs, PT  Note: Prognosis: Good  Problem List: Decreased strength, Decreased endurance, Impaired balance, Decreased mobility, Pain  Pt is a 62 y o  male seen for PT evaluation s/p admission to [address] [place] [address] on 6/17/2021 with Severe sepsis with septic shock ([facility] [address] )  Order placed for PT services  Upon evaluation: Pt is presenting with impaired functional mobility due to pain, decreased strength, decreased endurance, impaired balance, gait deviations and fall risk requiring supervision assistance for bed mobility, transfers, and ambulation with RW  Pt's clinical presentation is currently unstable/unpredictable given the functional mobility deficits above coupled with fall risks as indicated by AM-PAC 6-Clicks: 19/21 as well as hx of falls, impaired balance and polypharmacy and combined with medical complications of abnormal renal lab values, abnormal potassium values and need for input for mobility technique/safety  Pt's PMHx and comorbidities that may affect physical performance and progress include: DM, HTN and PTSD  Personal factors affecting pt at time of IE include: anxiety, depression, limited home support, past experience, inability to navigate level surfaces without external assistance, inability to navigate community distances and recent fall(s)/fall history   Pt will benefit from continued skilled PT services to address deficits as defined above and to maximize level of functional mobility to facilitate return toward PLOF and improved QOL  From PT/mobility standpoint, recommendation at time of d/c would be Home PT pending progress in order to reduce fall risk and maximize pt's functional independence and consistency with mobility in order to facilitate return to PLOF  Recommend trial with walker next 1-2 sessions and ther ex next 1-2 sessions  Barriers to Discharge: Decreased caregiver support        PT Discharge Recommendation: Home with home health rehabilitation     PT - OK to Discharge:  (when medically clear)    See flowsheet documentation for full assessment

## 2021-06-21 NOTE — PLAN OF CARE
Problem: PAIN - ADULT  Goal: Verbalizes/displays adequate comfort level or baseline comfort level  Description: Interventions:  - Encourage patient to monitor pain and request assistance  - Assess pain using appropriate pain scale  - Administer analgesics based on type and severity of pain and evaluate response  - Implement non-pharmacological measures as appropriate and evaluate response  - Consider cultural and social influences on pain and pain management  - Notify physician/advanced practitioner if interventions unsuccessful or patient reports new pain  Outcome: Progressing     Problem: INFECTION - ADULT  Goal: Absence or prevention of progression during hospitalization  Description: INTERVENTIONS:  - Assess and monitor for signs and symptoms of infection  - Monitor lab/diagnostic results  - Monitor all insertion sites, i e  indwelling lines, tubes, and drains  - Monitor endotracheal if appropriate and nasal secretions for changes in amount and color  - [place] appropriate cooling/warming therapies per order  - Administer medications as ordered  - Instruct and encourage patient and family to use good hand hygiene technique  - Identify and instruct in appropriate isolation precautions for identified infection/condition  Outcome: Progressing     Problem: SAFETY ADULT  Goal: Patient will remain free of falls  Description: INTERVENTIONS:  - Educate patient/family on patient safety including physical limitations  - Instruct patient to call for assistance with activity   - Consult OT/PT to assist with strengthening/mobility   - Keep Call bell within reach  - Keep bed low and locked with side rails adjusted as appropriate  - Keep care items and personal belongings within reach  - Initiate and maintain comfort rounds  - Make Fall Risk Sign visible to staff  - - Apply yellow socks and bracelet for high fall risk patients  - Consider moving patient to room near nurses station  Outcome: Progressing  Goal: Maintain or return to baseline ADL function  Description: INTERVENTIONS:  -  Assess patient's ability to carry out ADLs; assess patient's baseline for ADL function and identify physical deficits which impact ability to perform ADLs (bathing, care of mouth/teeth, toileting, grooming, dressing, etc )  - Assess/evaluate cause of self-care deficits   - Assess range of motion  - Assess patient's mobility; develop plan if impaired  - Assess patient's need for assistive devices and provide as appropriate  - Encourage maximum independence but intervene and supervise when necessary  - Involve family in performance of ADLs  - Assess for home care needs following discharge   - Consider OT consult to assist with ADL evaluation and planning for discharge  - Provide patient education as appropriate  Outcome: Progressing  Goal: Maintains/Returns to pre admission functional level  Description: INTERVENTIONS:  - Perform BMAT or MOVE assessment daily    - Set and communicate daily mobility goal to care team and patient/family/caregiver     - Collaborate with rehabilitation services on mobility goals if consulted  - Record patient progress and toleration of activity level   Outcome: Progressing     Problem: DISCHARGE PLANNING  Goal: Discharge to home or other facility with appropriate resources  Description: INTERVENTIONS:  - Identify barriers to discharge w/patient and caregiver  - Arrange for needed discharge resources and transportation as appropriate  - Identify discharge learning needs (meds, wound care, etc )  - Arrange for interpretive services to assist at discharge as needed  - Refer to Case Management Department for coordinating discharge planning if the patient needs post-hospital services based on physician/advanced practitioner order or complex needs related to functional status, cognitive ability, or social support system  Outcome: Progressing     Problem: Knowledge Deficit  Goal: Patient/family/caregiver demonstrates understanding of disease process, treatment plan, medications, and discharge instructions  Description: Complete learning assessment and assess knowledge base    Interventions:  - Provide teaching at level of understanding  - Provide teaching via preferred learning methods  Outcome: Progressing     Problem: GASTROINTESTINAL - ADULT  Goal: Minimal or absence of nausea and/or vomiting  Description: INTERVENTIONS:  - Administer IV fluids if ordered to ensure adequate hydration  - Maintain NPO status until nausea and vomiting are resolved  - Nasogastric tube if ordered  - Administer ordered antiemetic medications as needed  - Provide nonpharmacologic comfort measures as appropriate  - Advance diet as tolerated, if ordered  - Consider nutrition services referral to assist patient with adequate nutrition and appropriate food choices  Outcome: Progressing  Goal: Maintains or returns to baseline bowel function  Description: INTERVENTIONS:  - Assess bowel function  - Encourage oral fluids to ensure adequate hydration  - Administer IV fluids if ordered to ensure adequate hydration  - Administer ordered medications as needed  - Encourage mobilization and activity  - Consider nutritional services referral to assist patient with adequate nutrition and appropriate food choices  Outcome: Progressing  Goal: Maintains adequate nutritional intake  Description: INTERVENTIONS:  - Monitor percentage of each meal consumed  - Identify factors contributing to decreased intake, treat as appropriate  - Assist with meals as needed  - Monitor I&O, weight, and lab values if indicated  - Obtain nutrition services referral as needed  Outcome: Progressing  Goal: Oral mucous membranes remain intact  Description: INTERVENTIONS  - Assess oral mucosa and hygiene practices  - Implement preventative oral hygiene regimen  - Implement oral medicated treatments as ordered  - Initiate Nutrition services referral as needed  Outcome: Progressing     Problem: METABOLIC, FLUID AND ELECTROLYTES - ADULT  Goal: Electrolytes maintained within normal limits  Description: INTERVENTIONS:  - Monitor labs and assess patient for signs and symptoms of electrolyte imbalances  - Administer electrolyte replacement as ordered  - Monitor response to electrolyte replacements, including repeat lab results as appropriate  - Instruct patient on fluid and nutrition as appropriate  Outcome: Progressing  Goal: Fluid balance maintained  Description: INTERVENTIONS:  - Monitor labs   - Monitor I/O and WT  - Instruct patient on fluid and nutrition as appropriate  - Assess for signs & symptoms of volume excess or deficit  Outcome: Progressing  Goal: Glucose maintained within target range  Description: INTERVENTIONS:  - Monitor Blood Glucose as ordered  - Assess for signs and symptoms of hyperglycemia and hypoglycemia  - Administer ordered medications to maintain glucose within target range  - Assess nutritional intake and initiate nutrition service referral as needed  Outcome: Progressing     Problem: Potential for Falls  Goal: Patient will remain free of falls  Description: INTERVENTIONS:  - Educate patient/family on patient safety including physical limitations  - Instruct patient to call for assistance with activity   - Consult OT/PT to assist with strengthening/mobility   - Keep Call bell within reach  - Keep bed low and locked with side rails adjusted as appropriate  - Keep care items and personal belongings within reach  - Initiate and maintain comfort rounds  - Make Fall Risk Sign visible to staff  - Apply yellow socks and bracelet for high fall risk patients  - Consider moving patient to room near nurses station  Outcome: Progressing     Problem: MOBILITY - ADULT  Goal: Maintain or return to baseline ADL function  Description: INTERVENTIONS:  -  Assess patient's ability to carry out ADLs; assess patient's baseline for ADL function and identify physical deficits which impact ability to perform ADLs (bathing, care of mouth/teeth, toileting, grooming, dressing, etc )  - Assess/evaluate cause of self-care deficits   - Assess range of motion  - Assess patient's mobility; develop plan if impaired  - Assess patient's need for assistive devices and provide as appropriate  - Encourage maximum independence but intervene and supervise when necessary  - Involve family in performance of ADLs  - Assess for home care needs following discharge   - Consider OT consult to assist with ADL evaluation and planning for discharge  - Provide patient education as appropriate  Outcome: Progressing  Goal: Maintains/Returns to pre admission functional level  Description: INTERVENTIONS:  - Perform BMAT or MOVE assessment daily    - Set and communicate daily mobility goal to care team and patient/family/caregiver     - Collaborate with rehabilitation services on mobility goals if consulted  -- Out of bed for meals 3 times a day  - Out of bed for toileting  - Record patient progress and toleration of activity level   Outcome: Progressing     Problem: Prexisting or High Potential for Compromised Skin Integrity  Goal: Skin integrity is maintained or improved  Description: INTERVENTIONS:  - Identify patients at risk for skin breakdown  - Assess and monitor skin integrity  - Assess and monitor nutrition and hydration status  - Monitor labs   - Assess for incontinence   - Turn and reposition patient  - Assist with mobility/ambulation  - Relieve pressure over bony prominences  - Avoid friction and shearing  - Provide appropriate hygiene as needed including keeping skin clean and dry  - Evaluate need for skin moisturizer/barrier cream  - Collaborate with interdisciplinary team   - Patient/family teaching  - Consider wound care consult   Outcome: Progressing     Problem: Nutrition/Hydration-ADULT  Goal: Nutrient/Hydration intake appropriate for improving, restoring or maintaining nutritional needs  Description: Monitor and assess patient's nutrition/hydration status for malnutrition  Collaborate with interdisciplinary team and initiate plan and interventions as ordered  Monitor patient's weight and dietary intake as ordered or per policy  Utilize nutrition screening tool and intervene as necessary  Determine patient's food preferences and provide high-protein, high-caloric foods as appropriate       INTERVENTIONS:  - Monitor oral intake, urinary output, labs, and treatment plans  - Assess nutrition and hydration status and recommend course of action  - Evaluate amount of meals eaten  - Assist patient with eating if necessary   - Allow adequate time for meals  - Recommend/ encourage appropriate diets, oral nutritional supplements, and vitamin/mineral supplements  - Order, calculate, and assess calorie counts as needed  - Recommend, monitor, and adjust tube feedings and TPN/PPN based on assessed needs  - Assess need for intravenous fluids  - Provide specific nutrition/hydration education as appropriate  - Include patient/family/caregiver in decisions related to nutrition  Outcome: Progressing

## 2021-06-21 NOTE — CASE MANAGEMENT
LOS 3 days, no bundle, no 30 days readmission and unplanned readmission score is 11  Pt stated his PCP is Dr Alicia Hernandez  This was added to face sheet  Pt lives in [place] alone in apartment with 15 steps  Pt was independent with ADLs, drives a car, and on disability  DME:   SPC  Pt is not open with any VNA  Pt uses [facility] in [address]  For medications and Rx plan  Pt has hx of anxiety, depression and PTSD, but no D&A hx   Pt does not have a POA, but stated his brother Aiden Hollins would make medical decisions if needed  Pt's friend will transport home  Pt is agreeable to VNA for RN, PT and OT  Referral made and will f/u to see how can accept case  Pt will also need RW and is asking for bars in bathroom  Informed pt he would need to hire someone to put the bars up  Pt stated his friend can do that he will get his own bars on line  Sent order through [employer] for RW  A post acute care recommendation was made by your care team for [facility]  Discussed [place] of Choice with patient  List of agencies given to patient via in person  patient aware the list is custom filtered for them by insurance and that [facility]'s post acute providers are designated

## 2021-06-21 NOTE — OCCUPATIONAL THERAPY NOTE
Occupational Therapy Evaluation(time=0810-0822)   06/21/21 0810   Note Type   Note type Evaluation   Restrictions/Precautions   Weight Bearing Precautions Per Order No   Other Precautions Fall Risk   Pain Assessment   Pain Assessment Tool 0-10   Pain Score 6   Pain Location/Orientation Location: Abdomen   Home Living   Type of Home Apartment  (2nd--[address] with railing)   Home Layout One level   Bathroom Shower/Tub Tub/shower unit   Bathroom Toilet Standard   Home Equipment Cane   Prior Function   Lives With Alone   ADL Assistance Independent   Falls in the last 6 months 1 to 4  (1)   Comments PTA pt states independence with all aspects of his ADLs, transfers, ambulation--w/o device; +, +falls=1   Lifestyle   Autonomy PTA pt states independence with all aspects of his ADLs, transfers, ambulation--w/o device; +, +falls=1   Reciprocal Relationships supportive brother, friend   Service to Others severed in the [employer]--25yrs   Intrinsic Gratification watching TV   Psychosocial   Psychosocial (WDL) WDL   Patient Behaviors/Mood Flat affect; Cooperative   Subjective   Subjective "It would be better to use the bathroom rather than the bedpan "   ADL   Where Assessed Edge of bed   Eating Assistance 6  Modified independent   Grooming Assistance 6  [address] 5  Supervision/Setup    [address] 5  Supervision/Setup   LB Dressing Assistance 4  [address]  Supervision   Supine to Sit 5  Supervision   Additional items Increased time required; Bedrails;Verbal cues   Transfers   Sit to Stand 5  Supervision   Additional items Verbal cues; Bedrails   Stand to Sit 5  Supervision   Additional items Verbal cues   Functional Mobility   Functional Mobility 5  Supervision   Additional items Rolling walker   Balance   Static Sitting Good   Dynamic Sitting Fair +   Static Standing Fair   Dynamic Standing Fair -   Activity Tolerance   Activity Tolerance Patient limited by fatigue;Patient limited by pain   Medical Staff Made Aware nsg, P T , CM   RUE Assessment   RUE Assessment WFL  (limited shr AROM in endranges(i e beyond 90*flex))   RUE Strength   RUE Overall Strength Within Functional Limits - able to perform ADL tasks with strength  (shr=3+/5, elbow-distal=4/5)   LUE Assessment   LUE Assessment WFL   LUE Strength   LUE Overall Strength Within Functional Limits - able to perform ADL tasks with strength  (4/5 throughout)   Hand Function   Gross Motor Coordination Functional   Fine Motor Coordination Functional   Sensation   Light Touch No apparent deficits   Proprioception   Proprioception No apparent deficits   Vision-Basic Assessment   Current Vision Wears contacts   Vision - Complex Assessment   Acuity   (impaired)   Perception   Inattention/Neglect Appears intact   Cognition   Overall Cognitive Status WFL   Arousal/Participation Alert   Attention Within functional limits   Orientation Level Oriented X4   Memory Within functional limits   Following Commands Follows all commands and directions without difficulty   Assessment   Limitation Decreased ADL status; Decreased UE strength;Decreased Safe judgement during ADL;Decreased endurance;Decreased high-level ADLs   Prognosis Good   Assessment Pt is a 60y/o male admitted to the hospital 2* symptoms of dizziness, vomiting, diarrhea, and recent falls  Pt noted with severe sepsis with shock, colitis, ileus, KAYLEE, and cystitis  Pt with PMH anxiety, depression, HTN, PTSD, multiple knee sx, shr sx, and detached retinal repair  PTA pt states independence with all aspects of his ADLs, transfers, ambulation--w/o device; +, +falls=1  During initial eval, pt demonstrated slight deficits with his functional balance, functional mobility, ADL status, transfer safety, b/l UE strength, and activity tolerance(currently fair=15-20mins)   Pt would benefit from continued OT tx for the above deficits  3-5xwk/1-2wks  Goals   Patient Goals "to go home"   STG Time Frame   (1-5 days)   Short Term Goal #1 Pt will demonstrate improved activity tolerance to good(20-30mins) and standing tolerance to 3-5mins to assist with ADLs  Short Term Goal #2 Pt will demonstrate proper walker/transfer safety 100% of the time  Short Term Goal  Pt will demonstrate mod I with their UE and LE bathing/dresssing  LTG Time Frame   (5-10days)   Long Term Goal #1 Pt will demonstrate g/g- balance with all functional activities  Long Term Goal #2 Pt will independently verbalize 2-3 potential fall risks/transfer safety hazards and their appropriate compensation techniques  Plan   Treatment Interventions ADL retraining;Functional transfer training; Endurance training;UE strengthening/ROM; Patient/family training;Equipment evaluation/education; Compensatory technique education   Goal Expiration Date 07/02/21   OT Treatment Day 0   OT Frequency 3-5x/wk   Recommendation   OT Discharge Recommendation   (continue P T  )   Equipment Recommended   (RW)   AM-PAC Daily Activity Inpatient   Lower Body Dressing 3   Bathing 3   Toileting 3   Upper Body Dressing 4   Grooming 4   Eating 4   Daily Activity Raw Score 21   Daily Activity Standardized Score (Calc for Raw Score >=11) 44 27   AM-PAC Applied Cognition Inpatient   Following a Speech/Presentation 4   Understanding Ordinary Conversation 4   Taking Medications 4   Remembering Where Things Are Placed or Put Away 4   Remembering List of 4-5 Errands 4   Taking Care of Complicated Tasks 4   Applied Cognition Raw Score 24   Applied Cognition Standardized Score 62 21        Patient Name: Eugenie Morales  Today's Date: 6/21/2021  Problem List  Principal Problem:    Severe sepsis with septic shock ([facility] [address] )  Active Problems:    Anxiety    Colitis    Acute cystitis without hematuria    Essential hypertension    [facility]E [facility]acute [facility]) [facility] )    Type 2 diabetes mellitus ([facility] [address] )    Past Medical History  Past Medical History:   Diagnosis Date    Allergic rhinitis     Anxiety     Depression     Diabetes (HCC)     Hypertension     PTSD (post-traumatic stress disorder)     Sleep difficulties      Past Surgical History  Past Surgical History:   Procedure Laterality Date    INGUINAL HERNIA REPAIR      KNEE SURGERY      RETINAL DETACHMENT SURGERY      SHOULDER ARTHROSCOPY           06/21/21 0810   Note Type   Note type Evaluation   Restrictions/Precautions   Weight Bearing Precautions Per Order No   Other Precautions Fall Risk   Pain Assessment   Pain Assessment Tool 0-10   Pain Score 6   Pain Location/Orientation Location: Abdomen   Home Living   Type of Home Apartment  (2nd--[address] with railing)   Home Layout One level   Bathroom Shower/Tub Tub/shower unit   Bathroom Toilet Standard   Home Equipment Cane   Prior Function   Lives With Alone   ADL Assistance Independent   Falls in the last 6 months 1 to 4  (1)   Comments PTA pt states independence with all aspects of his ADLs, transfers, ambulation--w/o device; +, +falls=1   Lifestyle   Autonomy PTA pt states independence with all aspects of his ADLs, transfers, ambulation--w/o device; +, +falls=1   Reciprocal Relationships supportive brother, friend   Service to Others severed in the [employer]--25yrs   Intrinsic Gratification watching TV   Psychosocial   Psychosocial (WDL) WDL   Patient Behaviors/Mood Flat affect; Cooperative   Subjective   Subjective "It would be better to use the bathroom rather than the bedpan "   ADL   Where Assessed Edge of bed   Eating Assistance 6  Modified independent   Grooming Assistance 6  Modified Independent   UB Bathing Assistance 5  Supervision/Setup   LB Bathing Assistance 4  [address]  Supervision/Setup   LB Dressing Assistance 4  Minimal Assistance   Bed Mobility   Rolling R 5  Supervision   Rolling L 5  Supervision   Supine to Sit 5 Supervision   Additional items Increased time required; Bedrails;Verbal cues   Transfers   Sit to Stand 5  Supervision   Additional items Verbal cues; Bedrails   Stand to Sit 5  Supervision   Additional items Verbal cues   Functional Mobility   Functional Mobility 5  Supervision   Additional items Rolling walker   Balance   Static Sitting Good   Dynamic Sitting Fair +   Static Standing Fair   Dynamic Standing Fair -   Activity Tolerance   Activity Tolerance Patient limited by fatigue;Patient limited by pain   Medical Staff Made Aware nsg, P T , CM   RUE Assessment   RUE Assessment WFL  (limited shr AROM in endranges(i e beyond 90*flex))   RUE Strength   RUE Overall Strength Within Functional Limits - able to perform ADL tasks with strength  (shr=3+/5, elbow-distal=4/5)   LUE Assessment   LUE Assessment WFL   LUE Strength   LUE Overall Strength Within Functional Limits - able to perform ADL tasks with strength  (4/5 throughout)   Hand Function   Gross Motor Coordination Functional   Fine Motor Coordination Functional   Sensation   Light Touch No apparent deficits   Proprioception   Proprioception No apparent deficits   Vision-Basic Assessment   Current Vision Wears contacts   Vision - Complex Assessment   Acuity   (impaired)   Perception   Inattention/Neglect Appears intact   Cognition   Overall Cognitive Status WFL   Arousal/Participation Alert   Attention Within functional limits   Orientation Level Oriented X4   Memory Within functional limits   Following Commands Follows all commands and directions without difficulty   Assessment   Limitation Decreased ADL status; Decreased UE strength;Decreased Safe judgement during ADL;Decreased endurance;Decreased high-level ADLs   Prognosis Good   Assessment Pt is a 60y/o male admitted to the hospital 2* symptoms of dizziness, vomiting, diarrhea, and recent falls  Pt noted with severe sepsis with shock, colitis, ileus, KAYLEE, and cystitis   Pt with PMH anxiety, depression, HTN, PTSD, multiple knee sx, shr sx, and detached retinal repair  PTA pt states independence with all aspects of his ADLs, transfers, ambulation--w/o device; +, +falls=1  During initial eval, pt demonstrated slight deficits with his functional balance, functional mobility, ADL status, transfer safety, b/l UE strength, and activity tolerance(currently fair=15-20mins)  Pt would benefit from continued OT tx for the above deficits  3-5xwk/1-2wks  Goals   Patient Goals "to go home"   STG Time Frame   (1-5 days)   Short Term Goal #1 Pt will demonstrate improved activity tolerance to good(20-30mins) and standing tolerance to 3-5mins to assist with ADLs  Short Term Goal #2 Pt will demonstrate proper walker/transfer safety 100% of the time  Short Term Goal  Pt will demonstrate mod I with their UE and LE bathing/dresssing  LTG Time Frame   (5-10days)   Long Term Goal #1 Pt will demonstrate g/g- balance with all functional activities  Long Term Goal #2 Pt will independently verbalize 2-3 potential fall risks/transfer safety hazards and their appropriate compensation techniques  Plan   Treatment Interventions ADL retraining;Functional transfer training; Endurance training;UE strengthening/ROM; Patient/family training;Equipment evaluation/education; Compensatory technique education   Goal Expiration Date 07/02/21   OT Treatment Day 0   OT Frequency 3-5x/wk   Recommendation   OT Discharge Recommendation   (continue P T  )   Equipment Recommended   (RW)   AM-PAC Daily Activity Inpatient   Lower Body Dressing 3   Bathing 3   Toileting 3   Upper Body Dressing 4   Grooming 4   Eating 4   Daily Activity Raw Score 21   Daily Activity Standardized Score (Calc for Raw Score >=11) 44 27   AM-PAC Applied Cognition Inpatient   Following a Speech/Presentation 4   Understanding Ordinary Conversation 4   Taking Medications 4   Remembering Where Things Are Placed or Put Away 4   Remembering List of 4-5 Errands 4   Taking Care of Complicated Tasks 4   Applied Cognition Raw Score 24   Applied Cognition Standardized Score 62 21   Leeanne Sacks, OT

## 2021-06-21 NOTE — PHYSICAL THERAPY NOTE
PHYSICAL THERAPY EVALUATION  NAME:  Tea Dover  DATE: 06/21/21    AGE:   62 y o   Mrn:   [id number]  ADMIT DX:  Severe sepsis with septic shock [R65 [id number]]    Past Medical History:   Diagnosis Date    Allergic rhinitis     Anxiety     Depression     Diabetes ([address] )     Hypertension     PTSD (post-traumatic stress disorder)     Sleep difficulties      Length Of Stay: 4  Performed at least 2 patient identifiers during session: Name and Birthday  PHYSICAL THERAPY EVALUATION :        06/21/21 7189   Note Type   Note type Evaluation   Pain Assessment   Pain Assessment Tool 0-10   Pain Score 5   Pain Location/Orientation Other (Comment)  (stomach)   Home Living   Type of Home Apartment  (2nd floor, 13 CAROL with HR)   Home Layout One level;Performs ADLs on one level; Able to live on main level with bedroom/bathroom;Stairs to enter with rails   Bathroom Shower/Tub Tub/shower unit   [address] [facility]   Additional Comments Pt lives alone in 2nd floor apartment, pt owns [facility] but does not use at baseline   Prior Function   Level of [place] Independent with ADLs and functional mobility   Lives With Alone   Receives Help From Family;Friend(s)   ADL Assistance Independent   IADLs Independent   Falls in the last 6 months 1 to 4  (1 PTA)   Vocational On disability   Comments Pt reports being I for ADLs, IADLs, mobility without AD, and driving PTA   Restrictions/Precautions   Other Precautions Bed Alarm; Fall Risk;Pain   Cognition   Overall Cognitive Status WFL   Orientation Level Oriented X4   Following Commands Follows one step commands without difficulty   RLE Assessment   RLE Assessment WFL  (4/5 strength)   LLE Assessment   LLE Assessment WFL  (4/5 strength)   Bed Mobility   Supine to Sit 5  Supervision   Additional items Increased time required;Verbal cues;HOB elevated; Bedrails   Sit to Supine 5  Supervision   Additional items Increased time required;Verbal cues;HOB elevated; Bedrails Additional Comments Pt compled supine <> sit with use of bedrails and HOB elevated  Incidental VC for safety   Transfers   Sit to Stand 5  Supervision   Additional items Verbal cues   Stand to Sit 5  Supervision   Additional items Verbal cues   Additional Comments Pt used RW for all transfers  Incidental VC for safety and hand placement   Ambulation/Elevation   Gait pattern Short stride; Excessively slow;Decreased foot clearance; Wide RJ   Gait Assistance 5  Supervision   Additional items Verbal cues   Assistive Device Rolling walker   Distance 60' Pt ambulated with RW, min VC for RW management  Pt limited by fatigue, no LOB   Balance   Static Sitting Good   Dynamic Sitting Fair +   Static Standing Fair   Dynamic Standing Fair -   Ambulatory Fair -   Endurance Deficit   Endurance Deficit Yes   Endurance Deficit Description fatigue   Activity Tolerance   Activity Tolerance Patient limited by fatigue;Patient limited by pain   Medical Staff Made Aware OTCortez   Nurse Made Aware RNKim   Assessment   Prognosis Good   Problem List Decreased strength;Decreased endurance; Impaired balance;Decreased mobility;Pain   Barriers to Discharge Decreased caregiver support   Goals   Patient Goals "Go home"   STG Expiration Date 07/01/21   Plan   Treatment/Interventions Functional transfer training;LE strengthening/ROM; Elevations; Therapeutic exercise; Endurance training;Patient/family training;Equipment eval/education; Bed mobility;Gait training; Compensatory technique education;Spoke to nursing;Spoke to case management;OT   PT Frequency Other (Comment)  (3-5x/wk)   Recommendation   PT Discharge Recommendation Home with home health rehabilitation   Equipment Recommended [place] walker   Change/add to [employer]?  No   PT - OK to Discharge   (when medically clear)   Additional Comments At end of session, pt returned back to bed per pt preference with bed alarm engaged, all needs in reach AM-PAC Basic Mobility Inpatient   Turning in Bed Without Bedrails 4   Lying on Back to Sitting on Edge of Flat Bed 4   Moving Bed to Chair 3   Standing Up From Chair 3   Walk in Room 3   Climb 3-5 Stairs 3   Basic Mobility Inpatient Raw Score 20   Basic Mobility Standardized Score 43 99     The patient's AM-[facility] Basic Mobility Inpatient Short Form Raw Score is 20  , Standardized Score is 43 99    A standardized score greater than 42 9 suggests the patient may benefit from discharge to home  Please also refer to the recommendation of the Physical Therapist for safe discharge planning  (Please find full objective findings from PT assessment regarding body systems outlined above)  Assessment: Pt is a 62 y o  male seen for PT evaluation s/p admission to [address] [place] [address] on 6/17/2021 with Severe sepsis with septic shock ([facility] [address] )  Order placed for PT services  Upon evaluation: Pt is presenting with impaired functional mobility due to pain, decreased strength, decreased endurance, impaired balance, gait deviations and fall risk requiring supervision assistance for bed mobility, transfers, and ambulation with RW  Pt's clinical presentation is currently unstable/unpredictable given the functional mobility deficits above coupled with fall risks as indicated by AM-PAC 6-Clicks: 29/61 as well as hx of falls, impaired balance and polypharmacy and combined with medical complications of abnormal renal lab values, abnormal potassium values and need for input for mobility technique/safety  Pt's PMHx and comorbidities that may affect physical performance and progress include: DM, HTN and PTSD  Personal factors affecting pt at time of IE include: anxiety, depression, limited home support, past experience, inability to navigate level surfaces without external assistance, inability to navigate community distances and recent fall(s)/fall history   Pt will benefit from continued skilled PT services to address deficits as defined above and to maximize level of functional mobility to facilitate return toward PLOF and improved QOL  From PT/mobility standpoint, recommendation at time of d/c would be Home PT pending progress in order to reduce fall risk and maximize pt's functional independence and consistency with mobility in order to facilitate return to PLOF  Recommend trial with walker next 1-2 sessions and ther ex next 1-2 sessions  Goals: Pt will: Perform bed mobility tasks with modified I to reposition in bed and prepare for transfers  Pt will perform transfers with modified I to increase Indep in home alone environment and prepare for ambulation  Pt will ambulate with LRAD for >/= 150' with  modified I  to improve gait quality and promote proper use of assistive device and to access home environment  Pt will complete >/= 13 steps with with unilateral handrail with modified I to return to home with CAROL  Increase bilateral LE strength 1/2 grade to facilitate independent mobility and Increase all balance 1/2 grade to decrease risk for falls          Yomaira Lao, PT,DPT

## 2021-06-21 NOTE — PROGRESS NOTES
Progress Note - Lauren Patel 62 y o  male MRN: [id number]    Unit/Bed#: E4 -01 Encounter: [id number]      Subjective: The patient feels significantly better  He has less abdominal pain  He has no nausea or vomiting  Diarrhea is less  He is tolerating clear liquids  Has no chest pain or shortness of breath  Physical Exam:   Temp:  [98 1 °F (36 7 °C)-101 °F (38 3 °C)] 98 1 °F (36 7 °C)  HR:  [] 88  Resp:  [18-27] 18  BP: (121-146)/(75-92) 146/86    Gen:  Well-developed, well-nourished, in no distress  Neck:  Supple  No lymphadenopathy, goiter, or bruit  Heart:  Regular rhythm  I heard no murmur, gallop, or rub  Lungs:  Clear to auscultation and percussion  No wheezing, rales, or rhonchi    Abd:  Soft with active bowel sounds  Somewhat distended  No mass, tenderness, or organomegaly  Extremities:  No clubbing, cyanosis, or edema  No calf tenderness  Neuro:  Alert and oriented  No focal sign  Skin:  Warm and dry      LABS:   CBC:   Lab Results   Component Value Date    WBC 7 34 06/21/2021    HGB 12 7 06/21/2021    HCT 38 4 06/21/2021    MCV 90 06/21/2021     06/21/2021    MCH 29 7 06/21/2021    MCHC 33 1 06/21/2021    RDW 14 1 06/21/2021    MPV 9 1 06/21/2021   , CMP:   Lab Results   Component Value Date    SODIUM 141 06/21/2021    K 3 0 (L) 06/21/2021     06/21/2021    CO2 24 06/21/2021    BUN 26 (H) 06/21/2021    CREATININE 0 81 06/21/2021    CALCIUM 7 8 (L) 06/21/2021    EGFR 97 06/21/2021         Assessment/Plan:  1  Colitis, presumed infectious  2  Severe sepsis with septic shock on admission, improved  3  Acute kidney injury secondary to 2 , resolved  4  Type 2 diabetes, poorly controlled   5  Hypertension  6  History of depression and PTSD  7  Hypokalemia secondary to GI losses    The patient has improved significantly  He is hemodynamically stable  His kidney function has normalized  His potassium has improved somewhat  His glycemic control is satisfactory    He remains on ciprofloxacin and metronidazole  His diet will be advanced as tolerated        VTE Pharmacologic Prophylaxis: Heparin  VTE Mechanical Prophylaxis: sequential compression device

## 2021-06-21 NOTE — PLAN OF CARE
Problem: OCCUPATIONAL THERAPY ADULT  Goal: Performs self-care activities at highest level of function for planned discharge setting  See evaluation for individualized goals  Description: Treatment Interventions: ADL retraining, Functional transfer training, Endurance training, UE strengthening/ROM, Patient/family training, Equipment evaluation/education, Compensatory technique education  Equipment Recommended:  (RW)       See flowsheet documentation for full assessment, interventions and recommendations  Note: Limitation: Decreased ADL status, Decreased UE strength, Decreased Safe judgement during ADL, Decreased endurance, Decreased high-level ADLs  Prognosis: Good  Assessment: Pt is a 58y/o male admitted to the hospital 2* symptoms of dizziness, vomiting, diarrhea, and recent falls  Pt noted with severe sepsis with shock, colitis, ileus, KAYLEE, and cystitis  Pt with PMH anxiety, depression, HTN, PTSD, multiple knee sx, shr sx, and detached retinal repair  PTA pt states independence with all aspects of his ADLs, transfers, ambulation--w/o device; +, +falls=1  During initial eval, pt demonstrated slight deficits with his functional balance, functional mobility, ADL status, transfer safety, b/l UE strength, and activity tolerance(currently fair=15-20mins)  Pt would benefit from continued OT tx for the above deficits  3-5xwk/1-2wks        OT Discharge Recommendation:  (continue P T  )

## 2021-06-22 NOTE — PHYSICAL THERAPY NOTE
06/22/21 1002   PT Last Visit   PT Visit Date 06/22/21   Note Type   Note Type Treatment   Pain Assessment   Pain Assessment Tool 0-10   Pain Score 5   Pain Location/Orientation Location: Abdomen   Pain Onset/Description Onset: Ongoing; Descriptor: Discomfort   Effect of Pain on Daily Activities limits activity   Patient's Stated Pain Goal No pain   Hospital Pain Intervention(s) Repositioned; Ambulation/increased activity; Rest   Multiple Pain Sites No   Restrictions/Precautions   Weight Bearing Precautions Per Order No   Other Precautions Bed Alarm; Fall Risk;Pain   General   Chart Reviewed Yes   Family/Caregiver Present No   Cognition   Overall Cognitive Status WFL   Arousal/Participation Alert; Cooperative   Attention Within functional limits   Orientation Level Oriented X4   Memory Within functional limits   Following Commands Follows one step commands without difficulty   Comments pt agreed to PT session   Subjective   Subjective "My belly hurts  I hope I can get something to eat instead of this clear liquid diet  I hope to go home soon too "   Bed Mobility   Supine to Sit 5  Supervision   Additional items HOB elevated; Bedrails; Increased time required;Verbal cues   Sit to Supine 5  Supervision   Additional items Assist x 1;HOB elevated; Bedrails; Increased time required;Verbal cues   Additional Comments vcs for safety & technique   Transfers   Sit to Stand 5  Supervision   Additional items Verbal cues   Stand to Sit 5  Supervision   Additional items Verbal cues   Additional Comments vcs for safety & technique, RW used also   Ambulation/Elevation   Gait pattern Wide RJ; Decreased foot clearance; Short stride; Excessively slow   Gait Assistance 5  Supervision   Additional items Verbal cues   Assistive Device Rolling walker   Distance 120 feet   Stair Management Assistance Not tested   Balance   Static Sitting Good   Dynamic Sitting Fair +   Static Standing Fair   Dynamic Standing Thang Orte[place] 0771 - Endurance Deficit   Endurance Deficit Yes   Endurance Deficit Description fatigue   Activity Tolerance   Activity Tolerance Patient limited by fatigue;Patient limited by pain   Nurse Made Aware LAWRENCE Clark   Exercises   Hip Flexion Sitting;10 reps;AROM; Bilateral   Hip Abduction Sitting;10 reps;AROM; Bilateral   Hip Adduction Sitting;10 reps;AROM; Bilateral  (isometric)   Knee AROM Long Arc Quad Sitting;10 reps;AROM; Bilateral   Ankle Pumps Sitting;20 reps;AROM; Bilateral   Balance training  sitting EOB x 12 min   Assessment   Prognosis Good   Problem List Decreased strength;Decreased endurance; Impaired balance;Decreased mobility;Pain   Assessment Pt seen for PT treatment session this date with interventions consisting of gait training w/ emphasis on improving pt's ability to ambulate level surfaces x 120 feet with close S provided by therapist with RW and Therapeutic exercise consisting of: AROM 10-20 reps B LE in sitting position  Pt agreeable to PT treatment session upon arrival, pt found supine in bed w/ HOB elevated, in no apparent distress  In comparison to previous session, pt with improvements in amb distance & ex tolerance  Post session: pt returned BTB, bed alarm engaged and all needs in reach  Continue to recommend home with home health rehabilitation at time of d/c in order to maximize pt's functional independence and safety w/ mobility  Pt continues to be functioning below baseline level, and remains limited 2* factors listed above and including decreased strength, endurance & safe functional mobility  PT will continue to see pt during current hospitalization in order to address the deficits listed above and provide interventions consistent w/ POC in effort to achieve STGs  Barriers to Discharge Decreased caregiver support   Goals   Patient Goals to get some real food & go home   PT Treatment Day 1   Plan   Treatment/Interventions Functional transfer training;LE strengthening/ROM; Elevations; Therapeutic exercise; Endurance training;Patient/family training;Equipment eval/education; Bed mobility;Gait training;Spoke to nursing   Progress Progressing toward goals   PT Frequency   (3-5 x week)   Recommendation   PT Discharge Recommendation Home with home health rehabilitation   Equipment Recommended [address] [facility] Recommended Wheeled walker   Change/add to nxtControl? No   PT - OK to Discharge Yes  (when med cleared)   Sana 8 in Bed Without Bedrails 4   Lying on Back to Sitting on Edge of Flat Bed 4   Moving Bed to Chair 3   Standing Up From Chair 3   Walk in Room 3   Climb 3-5 Stairs 3   Basic Mobility Inpatient Raw Score 20   Basic Mobility Standardized Score 43 99   The patient's AM-PAC Basic Mobility Inpatient Short Form Raw Score is 20, Standardized Score is 43 99  A standardized score greater than 42 9 suggests the patient may benefit from discharge to home with HHPT   Please also refer to the recommendation of the Physical Therapist for safe discharge planning as discussed with Physical Therapist   Nicola Deras, SHANDRA

## 2021-06-22 NOTE — PLAN OF CARE
Problem: PAIN - ADULT  Goal: Verbalizes/displays adequate comfort level or baseline comfort level  Description: Interventions:  - Encourage patient to monitor pain and request assistance  - Assess pain using appropriate pain scale  - Administer analgesics based on type and severity of pain and evaluate response  - Implement non-pharmacological measures as appropriate and evaluate response  - Consider cultural and social influences on pain and pain management  - Notify physician/advanced practitioner if interventions unsuccessful or patient reports new pain  Outcome: Progressing     Problem: INFECTION - ADULT  Goal: Absence or prevention of progression during hospitalization  Description: INTERVENTIONS:  - Assess and monitor for signs and symptoms of infection  - Monitor lab/diagnostic results  - Monitor all insertion sites, i e  indwelling lines, tubes, and drains  - Monitor endotracheal if appropriate and nasal secretions for changes in amount and color  - [place] appropriate cooling/warming therapies per order  - Administer medications as ordered  - Instruct and encourage patient and family to use good hand hygiene technique  - Identify and instruct in appropriate isolation precautions for identified infection/condition  Outcome: Progressing     Problem: SAFETY ADULT  Goal: Patient will remain free of falls  Description: INTERVENTIONS:  - Educate patient/family on patient safety including physical limitations  - Instruct patient to call for assistance with activity   - Consult OT/PT to assist with strengthening/mobility   - Keep Call bell within reach  - Keep bed low and locked with side rails adjusted as appropriate  - Keep care items and personal belongings within reach  - Initiate and maintain comfort rounds  - Make Fall Risk Sign visible to staff  - Offer Toileting every 2 Hours, in advance of need  - Initiate/Maintain bedalarm  - Apply yellow socks and bracelet for high fall risk patients  - Consider moving patient to room near nurses station  Outcome: Progressing  Goal: Maintain or return to baseline ADL function  Description: INTERVENTIONS:  -  Assess patient's ability to carry out ADLs; assess patient's baseline for ADL function and identify physical deficits which impact ability to perform ADLs (bathing, care of mouth/teeth, toileting, grooming, dressing, etc )  - Assess/evaluate cause of self-care deficits   - Assess range of motion  - Assess patient's mobility; develop plan if impaired  - Assess patient's need for assistive devices and provide as appropriate  - Encourage maximum independence but intervene and supervise when necessary  - Involve family in performance of ADLs  - Assess for home care needs following discharge   - Consider OT consult to assist with ADL evaluation and planning for discharge  - Provide patient education as appropriate  Outcome: Progressing  Goal: Maintains/Returns to pre admission functional level  Description: INTERVENTIONS:  - Perform BMAT or MOVE assessment daily    - Set and communicate daily mobility goal to care team and patient/family/caregiver  - Collaborate with rehabilitation services on mobility goals if consulted  - Perform Range of Motion 3 times a day    - Stand patient 3 times a day  - Ambulate patient 3 times a day  - Out of bed to chair 3times a day   - Out of bed for meals 3 times a day  - Out of bed for toileting  - Record patient progress and toleration of activity level   Outcome: Progressing     Problem: DISCHARGE PLANNING  Goal: Discharge to home or other facility with appropriate resources  Description: INTERVENTIONS:  - Identify barriers to discharge w/patient and caregiver  - Arrange for needed discharge resources and transportation as appropriate  - Identify discharge learning needs (meds, wound care, etc )  - Arrange for interpretive services to assist at discharge as needed  - Refer to Case Management Department for coordinating discharge planning if the patient needs post-hospital services based on physician/advanced practitioner order or complex needs related to functional status, cognitive ability, or social support system  Outcome: Progressing     Problem: Knowledge Deficit  Goal: Patient/family/caregiver demonstrates understanding of disease process, treatment plan, medications, and discharge instructions  Description: Complete learning assessment and assess knowledge base    Interventions:  - Provide teaching at level of understanding  - Provide teaching via preferred learning methods  Outcome: Progressing     Problem: GASTROINTESTINAL - ADULT  Goal: Minimal or absence of nausea and/or vomiting  Description: INTERVENTIONS:  - Administer IV fluids if ordered to ensure adequate hydration  - Maintain NPO status until nausea and vomiting are resolved  - Nasogastric tube if ordered  - Administer ordered antiemetic medications as needed  - Provide nonpharmacologic comfort measures as appropriate  - Advance diet as tolerated, if ordered  - Consider nutrition services referral to assist patient with adequate nutrition and appropriate food choices  Outcome: Progressing  Goal: Maintains or returns to baseline bowel function  Description: INTERVENTIONS:  - Assess bowel function  - Encourage oral fluids to ensure adequate hydration  - Administer IV fluids if ordered to ensure adequate hydration  - Administer ordered medications as needed  - Encourage mobilization and activity  - Consider nutritional services referral to assist patient with adequate nutrition and appropriate food choices  Outcome: Progressing  Goal: Maintains adequate nutritional intake  Description: INTERVENTIONS:  - Monitor percentage of each meal consumed  - Identify factors contributing to decreased intake, treat as appropriate  - Assist with meals as needed  - Monitor I&O, weight, and lab values if indicated  - Obtain nutrition services referral as needed  Outcome: Progressing  Goal: Oral mucous membranes remain intact  Description: INTERVENTIONS  - Assess oral mucosa and hygiene practices  - Implement preventative oral hygiene regimen  - Implement oral medicated treatments as ordered  - Initiate Nutrition services referral as needed  Outcome: Progressing     Problem: METABOLIC, FLUID AND ELECTROLYTES - ADULT  Goal: Electrolytes maintained within normal limits  Description: INTERVENTIONS:  - Monitor labs and assess patient for signs and symptoms of electrolyte imbalances  - Administer electrolyte replacement as ordered  - Monitor response to electrolyte replacements, including repeat lab results as appropriate  - Instruct patient on fluid and nutrition as appropriate  Outcome: Progressing  Goal: Fluid balance maintained  Description: INTERVENTIONS:  - Monitor labs   - Monitor I/O and WT  - Instruct patient on fluid and nutrition as appropriate  - Assess for signs & symptoms of volume excess or deficit  Outcome: Progressing  Goal: Glucose maintained within target range  Description: INTERVENTIONS:  - Monitor Blood Glucose as ordered  - Assess for signs and symptoms of hyperglycemia and hypoglycemia  - Administer ordered medications to maintain glucose within target range  - Assess nutritional intake and initiate nutrition service referral as needed  Outcome: Progressing     Problem: Potential for Falls  Goal: Patient will remain free of falls  Description: INTERVENTIONS:  - Educate patient/family on patient safety including physical limitations  - Instruct patient to call for assistance with activity   - Consult OT/PT to assist with strengthening/mobility   - Keep Call bell within reach  - Keep bed low and locked with side rails adjusted as appropriate  - Keep care items and personal belongings within reach  - Initiate and maintain comfort rounds  - Make Fall Risk Sign visible to staff  - Offer Toileting every 2 Hours, in advance of need  - Initiate/Maintain bedalarm  - Apply yellow socks and bracelet for high fall risk patients  - Consider moving patient to room near nurses station  Outcome: Progressing     Problem: MOBILITY - ADULT  Goal: Maintain or return to baseline ADL function  Description: INTERVENTIONS:  -  Assess patient's ability to carry out ADLs; assess patient's baseline for ADL function and identify physical deficits which impact ability to perform ADLs (bathing, care of mouth/teeth, toileting, grooming, dressing, etc )  - Assess/evaluate cause of self-care deficits   - Assess range of motion  - Assess patient's mobility; develop plan if impaired  - Assess patient's need for assistive devices and provide as appropriate  - Encourage maximum independence but intervene and supervise when necessary  - Involve family in performance of ADLs  - Assess for home care needs following discharge   - Consider OT consult to assist with ADL evaluation and planning for discharge  - Provide patient education as appropriate  Outcome: Progressing  Goal: Maintains/Returns to pre admission functional level  Description: INTERVENTIONS:  - Perform BMAT or MOVE assessment daily    - Set and communicate daily mobility goal to care team and patient/family/caregiver  - Collaborate with rehabilitation services on mobility goals if consulted  - Perform Range of Motion 3 times a day  - Reposition patient every 2hours    - Stand patient 3 times a day  - Ambulate patient 3 times a day  - Out of bed to chair 3 times a day   - Out of bed for meals 3 times a day  - Out of bed for toileting  - Record patient progress and toleration of activity level   Outcome: Progressing     Problem: Prexisting or High Potential for Compromised Skin Integrity  Goal: Skin integrity is maintained or improved  Description: INTERVENTIONS:  - Identify patients at risk for skin breakdown  - Assess and monitor skin integrity  - Assess and monitor nutrition and hydration status  - Monitor labs   - Assess for incontinence   - Turn and reposition patient  - Assist with mobility/ambulation  - Relieve pressure over bony prominences  - Avoid friction and shearing  - Provide appropriate hygiene as needed including keeping skin clean and dry  - Evaluate need for skin moisturizer/barrier cream  - Collaborate with interdisciplinary team   - Patient/family teaching  - Consider wound care consult   Outcome: Progressing     Problem: Nutrition/Hydration-ADULT  Goal: Nutrient/Hydration intake appropriate for improving, restoring or maintaining nutritional needs  Description: Monitor and assess patient's nutrition/hydration status for malnutrition  Collaborate with interdisciplinary team and initiate plan and interventions as ordered  Monitor patient's weight and dietary intake as ordered or per policy  Utilize nutrition screening tool and intervene as necessary  Determine patient's food preferences and provide high-protein, high-caloric foods as appropriate       INTERVENTIONS:  - Monitor oral intake, urinary output, labs, and treatment plans  - Assess nutrition and hydration status and recommend course of action  - Evaluate amount of meals eaten  - Assist patient with eating if necessary   - Allow adequate time for meals  - Recommend/ encourage appropriate diets, oral nutritional supplements, and vitamin/mineral supplements  - Order, calculate, and assess calorie counts as needed  - Recommend, monitor, and adjust tube feedings and TPN/PPN based on assessed needs  - Assess need for intravenous fluids  - Provide specific nutrition/hydration education as appropriate  - Include patient/family/caregiver in decisions related to nutrition  Outcome: Progressing

## 2021-06-22 NOTE — PROGRESS NOTES
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 MRN: [id number]    Unit/Bed#: E4 -01 Encounter: [id number]      Subjective: The patient is feeling pretty well  He denies abdominal pain, nausea, vomiting, or diarrhea  He slept pretty well  He has had no chest pain or shortness of breath  His diet has been increased but he has not yet had any solid food  Physical Exam:   Temp:  [98 °F (36 7 °C)-98 5 °F (36 9 °C)] 98 °F (36 7 °C)  HR:  [91-93] 91  Resp:  [16-18] 18  BP: (145-153)/(75-85) 150/75    Gen:  Well-developed, well-nourished, in no distress  Neck:  Supple  No lymphadenopathy, goiter, or bruit  Heart:  Regular rhythm  No murmur, gallop, or rub  Lungs:  Clear to auscultation and percussion  No wheezing, rales, or rhonchi    Abd:  Soft with active bowel sounds  No mass, tenderness, or organomegaly  Extremities:  No clubbing, cyanosis, or edema  No calf tenderness  Neuro:  Alert and oriented  No focal sign  Skin:  Warm and dry      LABS:  No new labs        Patient Active Problem List   Diagnosis    Current severe episode of major depressive disorder without psychotic features without prior episode ([facility])    PTSD (post-traumatic stress disorder)    Anxiety    Severe sepsis with septic shock ([facility])    Colitis    Acute cystitis without hematuria    HHNC (hyperglycemic hyperosmolar nonketotic coma) ([facility])    Essential hypertension    Hypercalcemia    Right knee pain    Syncope and collapse    Abnormal CT scan of head    KAYLEE (acute kidney injury) ([facility] [address] )    Type 2 diabetes mellitus ([facility])       Assessment/Plan:  1  Colitis, presumed infectious, resolved  2  Severe sepsis with septic shock on admission, secondary to 1 , resolved  3  Acute kidney injury secondary to 2 , resolved  4  Type 2 diabetes, poorly controlled  5  Hypertension  6  History of depression PTSD  7  Hypokalemia secondary to GI losses    The patient is doing much better  He he has been advanced to a diabetic diet    He is hemodynamically stable  His diabetic control is much improved  We will recheck his potassium tomorrow morning  If he is able to tolerate a solid diet and his potassium is satisfactory, I believe the discharge tomorrow will be reasonable        VTE Pharmacologic Prophylaxis: Heparin  VTE Mechanical Prophylaxis: sequential compression device

## 2021-06-22 NOTE — PLAN OF CARE
Problem: PHYSICAL THERAPY ADULT  Goal: Performs mobility at highest level of function for planned discharge setting  See evaluation for individualized goals  Description: Treatment/Interventions: Functional transfer training, LE strengthening/ROM, Elevations, Therapeutic exercise, Endurance training, Patient/family training, Equipment eval/education, Bed mobility, Gait training, Compensatory technique education, Spoke to nursing, Spoke to case management, OT  Equipment Recommended: Pippa Maldonado       See flowsheet documentation for full assessment, interventions and recommendations  Outcome: Progressing  Note: Prognosis: Good  Problem List: Decreased strength, Decreased endurance, Impaired balance, Decreased mobility, Pain  Assessment: Pt seen for PT treatment session this date with interventions consisting of gait training w/ emphasis on improving pt's ability to ambulate level surfaces x 120 feet with close S provided by therapist with RW and Therapeutic exercise consisting of: AROM 10-20 reps B LE in sitting position  Pt agreeable to PT treatment session upon arrival, pt found supine in bed w/ HOB elevated, in no apparent distress  In comparison to previous session, pt with improvements in amb distance & ex tolerance  Post session: pt returned BTB, bed alarm engaged and all needs in reach  Continue to recommend home with home health rehabilitation at time of d/c in order to maximize pt's functional independence and safety w/ mobility  Pt continues to be functioning below baseline level, and remains limited 2* factors listed above and including decreased strength, endurance & safe functional mobility  PT will continue to see pt during current hospitalization in order to address the deficits listed above and provide interventions consistent w/ POC in effort to achieve STGs    Barriers to Discharge: Decreased caregiver support        PT Discharge Recommendation: Home with home health rehabilitation     PT - OK to Discharge: Yes (when med cleared)    See flowsheet documentation for full assessment

## 2021-06-22 NOTE — PROGRESS NOTES
The ciprofloxacin and metronidazole has  been converted to Oral per [facility] IV-to-PO Auto-Conversion Protocol for Adults as approved by the Pharmacy and Therapeutics Committee  The patient met all eligible criteria:  3 Age = 26years old   2) Received at least one dose of the IV form   3) Receiving at least one other scheduled oral/enteral medication   4) Tolerating an oral/enteral diet   and did not have any exclusions:   1) Critical care patient   2) Active GI bleed (IF assessing H2RAs or PPIs)   3) Continuous tube feeding (IF assessing cipro, doxycycline, levofloxacin, minocycline, rifampin, or voriconazole)   4) Receiving PO vancomycin (IF assessing metronidazole)   5) Persistent nausea and/or vomiting   6) Ileus or gastrointestinal obstruction   7) Antonio/nasogastric tube set for continuous suction   8) Specific order not to automatically convert to PO (in the order's comments or if discussed in the most recent Infectious Disease or primary team's progress notes)

## 2021-06-22 NOTE — PLAN OF CARE
Problem: PAIN - ADULT  Goal: Verbalizes/displays adequate comfort level or baseline comfort level  Description: Interventions:  - Encourage patient to monitor pain and request assistance  - Assess pain using appropriate pain scale  - Administer analgesics based on type and severity of pain and evaluate response  - Implement non-pharmacological measures as appropriate and evaluate response  - Consider cultural and social influences on pain and pain management  - Notify physician/advanced practitioner if interventions unsuccessful or patient reports new pain  Outcome: Progressing     Problem: INFECTION - ADULT  Goal: Absence or prevention of progression during hospitalization  Description: INTERVENTIONS:  - Assess and monitor for signs and symptoms of infection  - Monitor lab/diagnostic results  - Monitor all insertion sites, i e  indwelling lines, tubes, and drains  - Monitor endotracheal if appropriate and nasal secretions for changes in amount and color  - [place] appropriate cooling/warming therapies per order  - Administer medications as ordered  - Instruct and encourage patient and family to use good hand hygiene technique  - Identify and instruct in appropriate isolation precautions for identified infection/condition  Outcome: Progressing       Goal: Maintain or return to baseline ADL function  Description: INTERVENTIONS:  -  Assess patient's ability to carry out ADLs; assess patient's baseline for ADL function and identify physical deficits which impact ability to perform ADLs (bathing, care of mouth/teeth, toileting, grooming, dressing, etc )  - Assess/evaluate cause of self-care deficits   - Assess range of motion  - Assess patient's mobility; develop plan if impaired  - Assess patient's need for assistive devices and provide as appropriate  - Encourage maximum independence but intervene and supervise when necessary  - Involve family in performance of ADLs  - Assess for home care needs following discharge   - Consider OT consult to assist with ADL evaluation and planning for discharge  - Provide patient education as appropriate  Outcome: Progressing    Problem: DISCHARGE PLANNING  Goal: Discharge to home or other facility with appropriate resources  Description: INTERVENTIONS:  - Identify barriers to discharge w/patient and caregiver  - Arrange for needed discharge resources and transportation as appropriate  - Identify discharge learning needs (meds, wound care, etc )  - Arrange for interpretive services to assist at discharge as needed  - Refer to Case Management Department for coordinating discharge planning if the patient needs post-hospital services based on physician/advanced practitioner order or complex needs related to functional status, cognitive ability, or social support system  Outcome: Progressing     Problem: Knowledge Deficit  Goal: Patient/family/caregiver demonstrates understanding of disease process, treatment plan, medications, and discharge instructions  Description: Complete learning assessment and assess knowledge base    Interventions:  - Provide teaching at level of understanding  - Provide teaching via preferred learning methods  Outcome: Progressing     Problem: GASTROINTESTINAL - ADULT  Goal: Minimal or absence of nausea and/or vomiting  Description: INTERVENTIONS:  - Administer IV fluids if ordered to ensure adequate hydration  - Maintain NPO status until nausea and vomiting are resolved  - Nasogastric tube if ordered  - Administer ordered antiemetic medications as needed  - Provide nonpharmacologic comfort measures as appropriate  - Advance diet as tolerated, if ordered  - Consider nutrition services referral to assist patient with adequate nutrition and appropriate food choices  Outcome: Progressing  Goal: Maintains or returns to baseline bowel function  Description: INTERVENTIONS:  - Assess bowel function  - Encourage oral fluids to ensure adequate hydration  - Administer IV fluids if ordered to ensure adequate hydration  - Administer ordered medications as needed  - Encourage mobilization and activity  - Consider nutritional services referral to assist patient with adequate nutrition and appropriate food choices  Outcome: Progressing  Goal: Maintains adequate nutritional intake  Description: INTERVENTIONS:  - Monitor percentage of each meal consumed  - Identify factors contributing to decreased intake, treat as appropriate  - Assist with meals as needed  - Monitor I&O, weight, and lab values if indicated  - Obtain nutrition services referral as needed  Outcome: Progressing  Goal: Oral mucous membranes remain intact  Description: INTERVENTIONS  - Assess oral mucosa and hygiene practices  - Implement preventative oral hygiene regimen  - Implement oral medicated treatments as ordered  - Initiate Nutrition services referral as needed  Outcome: Progressing     Problem: METABOLIC, FLUID AND ELECTROLYTES - ADULT  Goal: Electrolytes maintained within normal limits  Description: INTERVENTIONS:  - Monitor labs and assess patient for signs and symptoms of electrolyte imbalances  - Administer electrolyte replacement as ordered  - Monitor response to electrolyte replacements, including repeat lab results as appropriate  - Instruct patient on fluid and nutrition as appropriate  Outcome: Progressing  Goal: Fluid balance maintained  Description: INTERVENTIONS:  - Monitor labs   - Monitor I/O and WT  - Instruct patient on fluid and nutrition as appropriate  - Assess for signs & symptoms of volume excess or deficit  Outcome: Progressing  Goal: Glucose maintained within target range  Description: INTERVENTIONS:  - Monitor Blood Glucose as ordered  - Assess for signs and symptoms of hyperglycemia and hypoglycemia  - Administer ordered medications to maintain glucose within target range  - Assess nutritional intake and initiate nutrition service referral as needed  Outcome: Progressing        Problem: MOBILITY - ADULT  Goal: Maintain or return to baseline ADL function  Description: INTERVENTIONS:  -  Assess patient's ability to carry out ADLs; assess patient's baseline for ADL function and identify physical deficits which impact ability to perform ADLs (bathing, care of mouth/teeth, toileting, grooming, dressing, etc )  - Assess/evaluate cause of self-care deficits   - Assess range of motion  - Assess patient's mobility; develop plan if impaired  - Assess patient's need for assistive devices and provide as appropriate  - Encourage maximum independence but intervene and supervise when necessary  - Involve family in performance of ADLs  - Assess for home care needs following discharge   - Consider OT consult to assist with ADL evaluation and planning for discharge  - Provide patient education as appropriate  Outcome: Progressing    Problem: Prexisting or High Potential for Compromised Skin Integrity  Goal: Skin integrity is maintained or improved  Description: INTERVENTIONS:  - Identify patients at risk for skin breakdown  - Assess and monitor skin integrity  - Assess and monitor nutrition and hydration status  - Monitor labs   - Assess for incontinence   - Turn and reposition patient  - Assist with mobility/ambulation  - Relieve pressure over bony prominences  - Avoid friction and shearing  - Provide appropriate hygiene as needed including keeping skin clean and dry  - Evaluate need for skin moisturizer/barrier cream  - Collaborate with interdisciplinary team   - Patient/family teaching  - Consider wound care consult   Outcome: Progressing     Problem: Nutrition/Hydration-ADULT  Goal: Nutrient/Hydration intake appropriate for improving, restoring or maintaining nutritional needs  Description: Monitor and assess patient's nutrition/hydration status for malnutrition  Collaborate with interdisciplinary team and initiate plan and interventions as ordered    Monitor patient's weight and dietary intake as ordered or per policy  Utilize nutrition screening tool and intervene as necessary  Determine patient's food preferences and provide high-protein, high-caloric foods as appropriate       INTERVENTIONS:  - Monitor oral intake, urinary output, labs, and treatment plans  - Assess nutrition and hydration status and recommend course of action  - Evaluate amount of meals eaten  - Assist patient with eating if necessary   - Allow adequate time for meals  - Recommend/ encourage appropriate diets, oral nutritional supplements, and vitamin/mineral supplements  - Order, calculate, and assess calorie counts as needed  - Recommend, monitor, and adjust tube feedings and TPN/PPN based on assessed needs  - Assess need for intravenous fluids  - Provide specific nutrition/hydration education as appropriate  - Include patient/family/caregiver in decisions related to nutrition  Outcome: Progressing

## 2021-06-23 NOTE — DISCHARGE SUMMARY
Discharge Summary Sebas Pastor, 1960, [id number]        Admission Date: 6/17/2021  Discharge Date: 6/23/2021    Discharge Diagnosis:   1  Infectious colitis, precise etiology unclear  2  Severe sepsis with septic shock secondary to 1   3  Acute kidney injury secondary to 2   4  Type 2 diabetes, poorly controlled  5  Hypertension  6  History of depression and PTSD  7  Hypokalemia     Consulting Physicians:  1  Dr Angelina Moreno, general surgery  2  Dr Fatuma Harris, pulmonary Medicine     Procedures Performed:   1  Femoral vein catheter  2  Arterial line     HPI:  The patient is 79-year-old man who presented to the [address] [place] [address] Emergency Room with abdominal pain and intractable diarrhea  He was found to be hypotensive with lactic acidosis  He was thought to be in septic shock  He was started on fluid resuscitation and pressors  He was transferred to [address] [address] for further care  Hospital Course: The patient was admitted to the ICU on the Critical Care Service  Vigorous fluid resuscitation continued  Stool studies were obtained and he was started on broad-spectrum antibiotics  He was evaluated by General surgery  They suspected infectious colitis and saw no indication for surgical intervention  Fortunately, with hemodynamic support, the patient's blood pressure improved  His lactic acidosis cleared  His kidney function returned to normal     Multiple stool studies were obtained including C difficile, bacterial pathogens, neuro virus, and Giardia antigen  All of these were negative  Giardia antigen is currently pending  The patient was continued on ciprofloxacin and metronidazole  Fortunately, his diarrhea and abdominal pain gradually improved  By the time of his discharge they had resolved  The patient has a history of type 2 diabetes which has not been well controlled  His diabetic control improved markedly during his stay      The patient has a history of depression and PTSD which presented no problems during his hospitalization  At the time of discharge the patient was feeling well  Vital signs were stable  Lungs were clear  Cardiac exam revealed regular rhythm  The abdomen was soft and nontender  There was +1 edema  Disposition:  The patient was discharged home on 6/23/2021  He was asked to continue a diabetic diet  His activity will be as tolerated  He will continue with ciprofloxacin and metronidazole for 3 more days  He was asked to arrange follow-up with his primary care physician at the [facility] within 1 week  Discharge instructions/Information to patient and family:   See after visit summary for information provided to patient and family  Provisions for Follow-Up Care:  See after visit summary for information related to follow-up care and any pertinent home health orders  Planned Readmission: No    Discharge Statement   I spent 40 minutes discharging the patient  This time was spent on the day of discharge  I had direct contact with the patient on the day of discharge  Discharge Medications:  See after visit summary for reconciled discharge medications provided to patient and family

## 2021-06-23 NOTE — PLAN OF CARE
Problem: PAIN - ADULT  Goal: Verbalizes/displays adequate comfort level or baseline comfort level  Description: Interventions:  - Encourage patient to monitor pain and request assistance  - Assess pain using appropriate pain scale  - Administer analgesics based on type and severity of pain and evaluate response  - Implement non-pharmacological measures as appropriate and evaluate response  - Consider cultural and social influences on pain and pain management  - Notify physician/advanced practitioner if interventions unsuccessful or patient reports new pain  Outcome: Progressing     Problem: INFECTION - ADULT  Goal: Absence or prevention of progression during hospitalization  Description: INTERVENTIONS:  - Assess and monitor for signs and symptoms of infection  - Monitor lab/diagnostic results  - Monitor all insertion sites, i e  indwelling lines, tubes, and drains  - Monitor endotracheal if appropriate and nasal secretions for changes in amount and color  - [place] appropriate cooling/warming therapies per order  - Administer medications as ordered  - Instruct and encourage patient and family to use good hand hygiene technique  - Identify and instruct in appropriate isolation precautions for identified infection/condition  Outcome: Progressing     Problem: SAFETY ADULT  Goal: Patient will remain free of falls  Description: INTERVENTIONS:  - Educate patient/family on patient safety including physical limitations  - Instruct patient to call for assistance with activity   - Consult OT/PT to assist with strengthening/mobility   - Keep Call bell within reach  - Keep bed low and locked with side rails adjusted as appropriate  - Keep care items and personal belongings within reach  - Initiate and maintain comfort rounds  - Make Fall Risk Sign visible to staff  - Offer Toileting every Hours, in advance of need  - Initiate/Maintain alarm  - Obtain necessary fall risk management equipment:   - Apply yellow socks and bracelet for high fall risk patients  - Consider moving patient to room near nurses station  Outcome: Progressing  Goal: Maintain or return to baseline ADL function  Description: INTERVENTIONS:  -  Assess patient's ability to carry out ADLs; assess patient's baseline for ADL function and identify physical deficits which impact ability to perform ADLs (bathing, care of mouth/teeth, toileting, grooming, dressing, etc )  - Assess/evaluate cause of self-care deficits   - Assess range of motion  - Assess patient's mobility; develop plan if impaired  - Assess patient's need for assistive devices and provide as appropriate  - Encourage maximum independence but intervene and supervise when necessary  - Involve family in performance of ADLs  - Assess for home care needs following discharge   - Consider OT consult to assist with ADL evaluation and planning for discharge  - Provide patient education as appropriate  Outcome: Progressing  Goal: Maintains/Returns to pre admission functional level  Description: INTERVENTIONS:  - Perform BMAT or MOVE assessment daily    - Set and communicate daily mobility goal to care team and patient/family/caregiver  - Collaborate with rehabilitation services on mobility goals if consulted  - Perform Range of Motion times a day  - Reposition patient every hours    - Dangle patient times a day  - Stand patient times a day  - Ambulate patient times a day  - Out of bed to chair times a day   - Out of bed for meals times a day  - Out of bed for toileting  - Record patient progress and toleration of activity level   Outcome: Progressing     Problem: DISCHARGE PLANNING  Goal: Discharge to home or other facility with appropriate resources  Description: INTERVENTIONS:  - Identify barriers to discharge w/patient and caregiver  - Arrange for needed discharge resources and transportation as appropriate  - Identify discharge learning needs (meds, wound care, etc )  - Arrange for interpretive services to assist at discharge as needed  - Refer to Case Management Department for coordinating discharge planning if the patient needs post-hospital services based on physician/advanced practitioner order or complex needs related to functional status, cognitive ability, or social support system  Outcome: Progressing     Problem: Knowledge Deficit  Goal: Patient/family/caregiver demonstrates understanding of disease process, treatment plan, medications, and discharge instructions  Description: Complete learning assessment and assess knowledge base    Interventions:  - Provide teaching at level of understanding  - Provide teaching via preferred learning methods  Outcome: Progressing     Problem: GASTROINTESTINAL - ADULT  Goal: Minimal or absence of nausea and/or vomiting  Description: INTERVENTIONS:  - Administer IV fluids if ordered to ensure adequate hydration  - Maintain NPO status until nausea and vomiting are resolved  - Nasogastric tube if ordered  - Administer ordered antiemetic medications as needed  - Provide nonpharmacologic comfort measures as appropriate  - Advance diet as tolerated, if ordered  - Consider nutrition services referral to assist patient with adequate nutrition and appropriate food choices  Outcome: Progressing  Goal: Maintains or returns to baseline bowel function  Description: INTERVENTIONS:  - Assess bowel function  - Encourage oral fluids to ensure adequate hydration  - Administer IV fluids if ordered to ensure adequate hydration  - Administer ordered medications as needed  - Encourage mobilization and activity  - Consider nutritional services referral to assist patient with adequate nutrition and appropriate food choices  Outcome: Progressing  Goal: Maintains adequate nutritional intake  Description: INTERVENTIONS:  - Monitor percentage of each meal consumed  - Identify factors contributing to decreased intake, treat as appropriate  - Assist with meals as needed  - Monitor I&O, weight, and lab values if indicated  - Obtain nutrition services referral as needed  Outcome: Progressing  Goal: Oral mucous membranes remain intact  Description: INTERVENTIONS  - Assess oral mucosa and hygiene practices  - Implement preventative oral hygiene regimen  - Implement oral medicated treatments as ordered  - Initiate Nutrition services referral as needed  Outcome: Progressing     Problem: METABOLIC, FLUID AND ELECTROLYTES - ADULT  Goal: Electrolytes maintained within normal limits  Description: INTERVENTIONS:  - Monitor labs and assess patient for signs and symptoms of electrolyte imbalances  - Administer electrolyte replacement as ordered  - Monitor response to electrolyte replacements, including repeat lab results as appropriate  - Instruct patient on fluid and nutrition as appropriate  Outcome: Progressing  Goal: Fluid balance maintained  Description: INTERVENTIONS:  - Monitor labs   - Monitor I/O and WT  - Instruct patient on fluid and nutrition as appropriate  - Assess for signs & symptoms of volume excess or deficit  Outcome: Progressing  Goal: Glucose maintained within target range  Description: INTERVENTIONS:  - Monitor Blood Glucose as ordered  - Assess for signs and symptoms of hyperglycemia and hypoglycemia  - Administer ordered medications to maintain glucose within target range  - Assess nutritional intake and initiate nutrition service referral as needed  Outcome: Progressing     Problem: Potential for Falls  Goal: Patient will remain free of falls  Description: INTERVENTIONS:  - Educate patient/family on patient safety including physical limitations  - Instruct patient to call for assistance with activity   - Consult OT/PT to assist with strengthening/mobility   - Keep Call bell within reach  - Keep bed low and locked with side rails adjusted as appropriate  - Keep care items and personal belongings within reach  - Initiate and maintain comfort rounds  - Make Fall Risk Sign visible to staff  - Offer Toileting every Hours, in advance of need  - Initiate/Maintain alarm  - Obtain necessary fall risk management equipment:   - Apply yellow socks and bracelet for high fall risk patients  - Consider moving patient to room near nurses station  Outcome: Progressing     Problem: MOBILITY - ADULT  Goal: Maintain or return to baseline ADL function  Description: INTERVENTIONS:  -  Assess patient's ability to carry out ADLs; assess patient's baseline for ADL function and identify physical deficits which impact ability to perform ADLs (bathing, care of mouth/teeth, toileting, grooming, dressing, etc )  - Assess/evaluate cause of self-care deficits   - Assess range of motion  - Assess patient's mobility; develop plan if impaired  - Assess patient's need for assistive devices and provide as appropriate  - Encourage maximum independence but intervene and supervise when necessary  - Involve family in performance of ADLs  - Assess for home care needs following discharge   - Consider OT consult to assist with ADL evaluation and planning for discharge  - Provide patient education as appropriate  Outcome: Progressing  Goal: Maintains/Returns to pre admission functional level  Description: INTERVENTIONS:  - Perform BMAT or MOVE assessment daily    - Set and communicate daily mobility goal to care team and patient/family/caregiver  - Collaborate with rehabilitation services on mobility goals if consulted  - Perform Range of Motion times a day  - Reposition patient every hours    - Dangle patient times a day  - Stand patient times a day  - Ambulate patient times a day  - Out of bed to chair times a day   - Out of bed for meals times a day  - Out of bed for toileting  - Record patient progress and toleration of activity level   Outcome: Progressing     Problem: Prexisting or High Potential for Compromised Skin Integrity  Goal: Skin integrity is maintained or improved  Description: INTERVENTIONS:  - Identify patients at risk for skin breakdown  - Assess and monitor skin integrity  - Assess and monitor nutrition and hydration status  - Monitor labs   - Assess for incontinence   - Turn and reposition patient  - Assist with mobility/ambulation  - Relieve pressure over bony prominences  - Avoid friction and shearing  - Provide appropriate hygiene as needed including keeping skin clean and dry  - Evaluate need for skin moisturizer/barrier cream  - Collaborate with interdisciplinary team   - Patient/family teaching  - Consider wound care consult   Outcome: Progressing     Problem: Nutrition/Hydration-ADULT  Goal: Nutrient/Hydration intake appropriate for improving, restoring or maintaining nutritional needs  Description: Monitor and assess patient's nutrition/hydration status for malnutrition  Collaborate with interdisciplinary team and initiate plan and interventions as ordered  Monitor patient's weight and dietary intake as ordered or per policy  Utilize nutrition screening tool and intervene as necessary  Determine patient's food preferences and provide high-protein, high-caloric foods as appropriate       INTERVENTIONS:  - Monitor oral intake, urinary output, labs, and treatment plans  - Assess nutrition and hydration status and recommend course of action  - Evaluate amount of meals eaten  - Assist patient with eating if necessary   - Allow adequate time for meals  - Recommend/ encourage appropriate diets, oral nutritional supplements, and vitamin/mineral supplements  - Order, calculate, and assess calorie counts as needed  - Recommend, monitor, and adjust tube feedings and TPN/PPN based on assessed needs  - Assess need for intravenous fluids  - Provide specific nutrition/hydration education as appropriate  - Include patient/family/caregiver in decisions related to nutrition  Outcome: Progressing

## 2021-06-23 NOTE — CASE MANAGEMENT
MD has discharged pt home today  Pt will need RW and VNA  [facility] stated [facility] will accept case, but LSW will need to call the [place] to get approval  Joseph Segura stated pt needs to go through the [place] for RW  TC to [phone] at the [facility] for assistance  They will have someone from the [place] call this LSW  TC to [place] ([phone] ext [phone]) and left message that pt will need RW and VNA  [facility] is agreeable,but will need auth numbers for them  Also informed them pt would be discharge today

## 2021-06-23 NOTE — CASE MANAGEMENT
[facility] willing to accept case if [place] auth  Made 6 phone calls leaving messages about home PT/RN/OT and also that pt will need RW  Talked with Samira Dumont at [phone] ext [phone] stating she can see that walker as been order and waiting on PCP to put in order for VNA  Informed her [facility] accepted the case and she will f/u with them  FAX AVS to [facility] and informed [facility] that [place] will be in touch  Pt and  RN is aware of this

## 2021-08-10 NOTE — PROGRESS NOTES
MARGARETTE Davis 62 y o  male   Date:  8/10/2021    TCM Call (since 7/10/2021)     None      TCM Call (since 7/10/2021)     None        Hospital records were reviewed  Medications upon discharge reviewed/updated  Discharge Disposition:    Follow up visits with other specialists:       Assessment and Plan: infectious colitis resolved at a allergy unclear  Infectious colitis complicated with severe sepsis with septic shock and acute kidney injury    Diabetes mellitus type 2 poorly controlled with hemoglobin A1c at 12 5 the patient is taking Lantus 18 units subcutaneously daily the patient states that he is making arrangements to follow with endocrinology for his diabetes    History of depression and posttraumatic stress disorder treated with Zoloft 50 mg    Bilateral maxillary sinusitis will be treated with Zithromax    B12 deficiency the patient will be receiving supplemental B12 1000 mcg intramuscularly every 30 days    Annabella Segundo was seen today for establish care and follow-up  Diagnoses and all orders for this visit:    Subacute maxillary sinusitis  -     azithromycin (ZITHROMAX) 500 MG tablet; Take 1 tablet (500 mg total) by mouth daily for 4 days    Major depressive disorder, single episode, severe without psychotic features [facility])            HPI:   Patient presents  To establish care he had been hospitalized in June of this year with infectious colitis and septic shock, and diabetes mellitus  uncontrolled      ROS: Review of Systems   Constitutional: Negative for chills and fever  HENT: Positive for sinus pressure, sinus pain and sore throat  Negative for ear pain  Eyes: Negative for pain and visual disturbance  Respiratory: Negative for cough and shortness of breath  Cardiovascular: Negative for chest pain and palpitations  Gastrointestinal: Negative for abdominal pain and vomiting  Genitourinary: Negative for dysuria and hematuria  Musculoskeletal: Negative for arthralgias and back pain  Skin: Negative for color change and rash  Neurological: Negative for seizures and syncope  All other systems reviewed and are negative  Past Medical History:   Diagnosis Date    Allergic rhinitis     Anxiety     Depression     Diabetes (HCC)     Hypertension     PTSD (post-traumatic stress disorder)     Sleep difficulties        Past Surgical History:   Procedure Laterality Date    INGUINAL HERNIA REPAIR      KNEE SURGERY      RETINAL DETACHMENT SURGERY      SHOULDER ARTHROSCOPY         Social History     Socioeconomic History    Marital status: Single     Spouse name: None    Number of children: None    Years of education: None    Highest education level: None   Occupational History    None   Tobacco Use    Smoking status: Former Smoker     Packs/day: 0 25     Years: 5 00     Pack years: 1 25     Types: Cigarettes     Quit date: 1988     Years since quittin 9    Smokeless tobacco: Former User     Quit date: 1988    Tobacco comment: patient quit smoking 30 yrs ago   Vaping Use    Vaping Use: Former   Substance and Sexual Activity    Alcohol use: Not Currently    Drug use: No    Sexual activity: Not Currently   Other Topics Concern    None   Social History Narrative    None     Social Determinants of Health     Financial Resource Strain:     Difficulty of Paying Living Expenses:    Food Insecurity:     Worried About Running Out of Food in the Last Year:     Ran Out of Food in the Last Year:    Transportation Needs:     Lack of Transportation (Medical):      Lack of Transportation (Non-Medical):    Physical Activity:     Days of Exercise per Week:     Minutes of Exercise per Session:    Stress:     Feeling of Stress :    Social Connections:     Frequency of Communication with Friends and Family:     Frequency of Social Gatherings with Friends and Family:     Attends Spiritism Services:     Active Member of Clubs or Organizations:     Attends Club or Organization Meetings:     Marital Status:    Intimate Partner Violence:     Fear of Current or Ex-Partner:     Emotionally Abused:     Physically Abused:     Sexually Abused:        Family History   Problem Relation Age of Onset    Psychiatric Illness Father        Allergies   Allergen Reactions    Amoxicillin GI Intolerance         Current Outpatient Medications:     insulin glargine (LANTUS) 100 units/mL subcutaneous injection, Inject 18 Units under the skin daily at bedtime (Patient taking differently: Inject 20 Units under the skin daily at bedtime ), Disp: 10 mL, Rfl: 0    loratadine (CLARITIN) 10 mg tablet, Take 10 mg by mouth daily, Disp: , Rfl:     sertraline (ZOLOFT) 50 mg tablet, Take 3 tablets (150 mg total) by mouth daily (Patient taking differently: Take 150 mg by mouth daily (Max of 3 tablets a day)), Disp: 90 tablet, Rfl: 0    azithromycin (ZITHROMAX) 500 MG tablet, Take 1 tablet (500 mg total) by mouth daily for 4 days, Disp: 4 tablet, Rfl: 0    cholecalciferol 1000 units tablet, Take 1 tablet (1,000 Units total) by mouth daily Start after finishing Vitamin D2 66800J (Patient not taking: Reported on 8/10/2021), Disp: 30 tablet, Rfl: 0    cyanocobalamin 1,000 mcg/mL, Inject 1 mL (1,000 mcg total) into a muscle every 30 (thirty) days (Patient not taking: Reported on 8/10/2021), Disp: 1 mL, Rfl: 1      Physical Exam:  /84   Pulse 84   Temp (!) 97 4 °F (36 3 °C)   Resp 20   Ht 5' 6" (1 676 m)   Wt 74 4 kg (164 lb)   BMI 26 47 kg/m²     Physical Exam  Constitutional:       Appearance: He is well-developed  HENT:      Head: Normocephalic  Nose:      Right Sinus: Maxillary sinus tenderness present  Left Sinus: Maxillary sinus tenderness present  Eyes:      Pupils: Pupils are equal, round, and reactive to light  Cardiovascular:      Rate and Rhythm: Normal rate and regular rhythm  Heart sounds: Normal heart sounds     Pulmonary:      Effort: Pulmonary effort is normal       Breath sounds: Normal breath sounds  Abdominal:      General: Bowel sounds are normal       Palpations: Abdomen is soft  Tenderness: There is no abdominal tenderness  Musculoskeletal:      Cervical back: Normal range of motion  Skin:     General: Skin is warm  Neurological:      Mental Status: He is alert and oriented to person, place, and time               Labs:  Lab Results   Component Value Date    WBC 7 34 06/21/2021    HGB 12 7 06/21/2021    HCT 38 4 06/21/2021    MCV 90 06/21/2021     06/21/2021     Lab Results   Component Value Date    K 3 3 (L) 06/23/2021     06/23/2021    CO2 22 06/23/2021    BUN 16 06/23/2021    CREATININE 0 68 06/23/2021    CALCIUM 7 7 (L) 06/23/2021    CORRECTEDCA 9 1 06/19/2021    AST 67 (H) 06/19/2021    ALT 98 (H) 06/19/2021    ALKPHOS 54 06/19/2021    EGFR 105 06/23/2021

## 2022-01-01 ENCOUNTER — IMMUNIZATIONS (OUTPATIENT)
Dept: FAMILY MEDICINE CLINIC | Facility: HOSPITAL | Age: 62
End: 2022-01-01

## 2022-01-01 ENCOUNTER — HOSPITAL ENCOUNTER (EMERGENCY)
Facility: HOSPITAL | Age: 62
End: 2022-02-27
Attending: EMERGENCY MEDICINE | Admitting: EMERGENCY MEDICINE
Payer: OTHER GOVERNMENT

## 2022-01-01 VITALS — TEMPERATURE: 94.7 F | BODY MASS INDEX: 27.44 KG/M2 | WEIGHT: 170 LBS

## 2022-01-01 DIAGNOSIS — Z23 ENCOUNTER FOR IMMUNIZATION: Primary | ICD-10-CM

## 2022-01-01 DIAGNOSIS — I46.9 CARDIAC ARREST (HCC): Primary | ICD-10-CM

## 2022-01-01 LAB — GLUCOSE SERPL-MCNC: >500 MG/DL (ref 65–140)

## 2022-01-01 PROCEDURE — 31500 INSERT EMERGENCY AIRWAY: CPT | Performed by: EMERGENCY MEDICINE

## 2022-01-01 PROCEDURE — 91306 COVID-19 MODERNA VACC 0.25 ML BOOSTER: CPT

## 2022-01-01 PROCEDURE — 99285 EMERGENCY DEPT VISIT HI MDM: CPT

## 2022-01-01 PROCEDURE — 0064A COVID-19 MODERNA VACC 0.25 ML BOOSTER: CPT

## 2022-01-01 PROCEDURE — 99291 CRITICAL CARE FIRST HOUR: CPT | Performed by: EMERGENCY MEDICINE

## 2022-01-01 PROCEDURE — 82948 REAGENT STRIP/BLOOD GLUCOSE: CPT

## 2022-01-01 PROCEDURE — 92950 HEART/LUNG RESUSCITATION CPR: CPT

## 2022-01-01 RX ORDER — CALCIUM CHLORIDE 100 MG/ML
SYRINGE (ML) INTRAVENOUS CODE/TRAUMA/SEDATION MEDICATION
Status: COMPLETED | OUTPATIENT
Start: 2022-01-01 | End: 2022-01-01

## 2022-01-01 RX ORDER — EPINEPHRINE 0.1 MG/ML
SYRINGE (ML) INJECTION CODE/TRAUMA/SEDATION MEDICATION
Status: COMPLETED | OUTPATIENT
Start: 2022-01-01 | End: 2022-01-01

## 2022-01-01 RX ORDER — SODIUM BICARBONATE 84 MG/ML
INJECTION, SOLUTION INTRAVENOUS CODE/TRAUMA/SEDATION MEDICATION
Status: COMPLETED | OUTPATIENT
Start: 2022-01-01 | End: 2022-01-01

## 2022-01-01 RX ADMIN — SODIUM BICARBONATE 50 MEQ: 84 INJECTION, SOLUTION INTRAVENOUS at 11:39

## 2022-01-01 RX ADMIN — EPINEPHRINE 1 MG: 0.1 INJECTION, SOLUTION ENDOTRACHEAL; INTRACARDIAC; INTRAVENOUS at 11:57

## 2022-01-01 RX ADMIN — CALCIUM CHLORIDE 1 G: 100 INJECTION PARENTERAL at 11:39

## 2022-01-01 RX ADMIN — EPINEPHRINE 1 MG: 0.1 INJECTION, SOLUTION ENDOTRACHEAL; INTRACARDIAC; INTRAVENOUS at 11:42

## 2022-01-01 RX ADMIN — EPINEPHRINE 1 MG: 0.1 INJECTION, SOLUTION ENDOTRACHEAL; INTRACARDIAC; INTRAVENOUS at 11:38

## 2022-01-01 RX ADMIN — EPINEPHRINE 1 MG: 0.1 INJECTION, SOLUTION ENDOTRACHEAL; INTRACARDIAC; INTRAVENOUS at 11:47

## 2022-02-27 NOTE — ED PROVIDER NOTES
Final Diagnosis:  1  Cardiac arrest [facility])        Chief Complaint   Patient presents with    Cardiac Arrest     HPI  Patient presents in cardiac arrest   Initially hip history provided by BLS to arise the patient  They state that they were called to the patient after a witnessed collapse  According to them that when police originally responded that the patient was down but breathing  Upon their arrival he had no independent respirations and was found to be pulseless  CPR was started at that time  The patient was not in shockable rhythm  They then tell me that they work the patient up for approximately 20 minutes  They attempted to reach out to AL last who was unable to come to the location for an extended period time  Given this they then made the decision to come into the hospital as they were located only a short time way  Remainder of history was provided by friends  They state that they saw the patient this morning and that he was in his baseline health  They had breakfast with him and that 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  As far as they are where he offered them no complaints over the course of the morning  In his baseline health  Unless otherwise specified:  - No language barrier    - History obtained from patient  - There are no limitations to the history obtained  - Previous charting was reviewed    PMH:   has a past medical history of Allergic rhinitis, Anxiety, Depression, Diabetes ([address] ), Hypertension, PTSD (post-traumatic stress disorder), and Sleep difficulties  PSH:   has a past surgical history that includes Shoulder arthroscopy; Retinal detachment surgery; Knee surgery; and Inguinal hernia repair  ROS:  Review of Systems   - none responsive  - 13 point ROS was performed and all are normal unless stated in the history above  - Nursing note reviewed  Vitals reviewed  - Orders placed by myself and/or advanced practitioner / resident          PE: Vitals:    02/27/22 1149 02/27/22 1150 02/27/22 1156 02/27/22 1200   BP:  (!) 0/0  (!) 0/0   Pulse: (!) 0 (!) 0 (!) 0 (!) 0   Resp:  (!) 0  (!) 0   Temp:       TempSrc:       SpO2:  (!) 0%     Weight:         Vitals reviewed by me  Patient arrives with [facility] device on chest   Compressions ongoing  Patient with oral airway in being bagged  Emesis from mouth  Face and head mottled  No obvious signs of head trauma  No obvious signs of chest trauma  Abdomen is large, distended  No significant peripheral edema  Patient is unresponsive  Pupils are dilated and nonresponsive  No corneal reflex  No gag  No response to pain  Unless otherwise specified above:    General: VS reviewed      Head: Normocephalic, atraumatic  Eyes:   No exudate  ENT: Atraumatic external nose and ears  Neck: No JVD  Lungs:   respiratory distress        MSK:     No obvious deformity    Skin: Dry, intact           Psychiatric/Behavioral: Appropriate mood and affect   Exam: deferred    Physical Exam     CriticalCare Time  Performed by: Steve Prakash MD  Authorized by: Steve Prakash MD     Critical care provider statement:     Critical care time (minutes):  35    Critical care was necessary to treat or prevent imminent or life-threatening deterioration of the following conditions:  Cardiac failure, circulatory failure and respiratory failure    Critical care was time spent personally by me on the following activities:  Obtaining history from patient or surrogate, evaluation of patient's response to treatment, examination of patient and interpretation of cardiac output measurements  Intubation    Date/Time: 2/27/2022 1:08 PM  Performed by: Steve Prakash MD  Authorized by: Stvee Prakash MD     Patient location:  ED  Consent:     Consent obtained:  Emergent situation  Pre-procedure details:     Patient status:  Unresponsive    Mallampati score:  2  Indications:     Indications for intubation: respiratory failure    Procedure details:     Preoxygenation:  Bag valve mask    CPR in progress: yes      Intubation method:  Oral    Oral intubation technique:  Direct and glidescope    Laryngoscope blade: Mac 3    Tube size (mm):  8 0    Tube type:  Cuffed    Number of attempts:  2    Ventilation between attempts: yes      Cricoid pressure: no      Tube visualized through cords: yes    Placement assessment:     ETT to teeth:  26    Placement verification: ETCO2 detector and tube exhalation         A:  - Nursing note reviewed  No orders to display     Orders Placed This Encounter   Procedures    Critical Care    Intubation    CBC and differential    Comprehensive metabolic panel    HS Troponin 0hr (reflex protocol)    Protime-INR    APTT    Lactic acid, plasma    D-dimer, quantitative    Notify Physician: Increase in chest pain, symptomatic hypotension, or change in cardiac rhythm, or O2 less than 90%    Insert peripheral IV    Continuous cardiac monitoring    Continuous pulse oximetry    ECG 12 lead     Labs Reviewed - No data to display      Final Diagnosis:  1  Cardiac arrest ([facility] [address] )        P:  - upon arrival to the emergency department compressions were continued  Two IVs were placed  Labs were drawn  Patient was provided with bicarbonate as well as calcium  Patient was intubated on 2nd pass by myself  Large amount of emesis in oropharynx  When a view of the vocal cords was eventually obtained each compression cause some amount of emesis to be expelled  Received approximately 3 doses of epinephrine  Over this course of time at each pulse check and rhythm check there was cardiac standstill on ultrasound, no palpable pulses, and no organized rhythm on bedside ultrasound    During 1 of the pauses there appeared to be a rhythm on the monitor which was consistent with PE a as there were no pulses palpable at this time by myself and there was no perfusing rhythm seen on bedside echo  No obvious effusion on echocardiogram   -no further intervention to be provided emergency department  Overall down time of approximately 1 hour  Time of death was called at 11:59 a m     I then went discussed with the patient's friends who were here at the hospital   They state that the patient is like family to them and that they are the closest people to him in this area  I discussed with them reaching out to family to inform them as he has a brother who lives in [place]  They felt that the family would prefer to hear from them which is reasonable      Medications   EPINEPHrine (ADRENALIN) injection (1 mg Intravenous Given 22 1157)   sodium bicarbonate 50 mEq/50 mL injection (50 mEq Intravenous Given 22 1139)   sodium chloride 0 9 % bolus (0 mL Intravenous Stopped 22 113)   calcium chloride 1 g/10 mL injection (1 g Intravenous Given 22)     Time reflects when diagnosis was documented in both MDM as applicable and the Disposition within this note     Time User Action Codes Description Comment    2022  1:09 PM Ángela Quinones Add [I46 9] Cardiac arrest [facility])       ED Disposition     ED Disposition Condition Date/Time Comment      Sun 2022 12:51 PM       Follow-up Information    None       Discharge Medication List as of 2022  1:00 PM      CONTINUE these medications which have NOT CHANGED    Details   cholecalciferol 1000 units tablet Take 1 tablet (1,000 Units total) by mouth daily Start after finishing Vitamin D2 64967A, Starting 2018, Print      cyanocobalamin 1,000 mcg/mL Inject 1 mL (1,000 mcg total) into a muscle every 30 (thirty) days, Starting Fri 10/19/2018, Print      doxepin (SINEquan) 75 MG capsule Take 75 mg by mouth daily at bedtime, Starting 2021, Historical Med      insulin glargine (LANTUS) 100 units/mL subcutaneous injection Inject 18 Units under the skin daily at bedtime, Starting 2018, Print lisinopril-hydrochlorothiazide (PRINZIDE,ZESTORETIC) 10-12 5 MG per tablet Take 1 tablet by mouth daily, Historical Med      loratadine (CLARITIN) 10 mg tablet Take 10 mg by mouth daily, Until Discontinued, Historical Med      meloxicam (Mobic) 15 mg tablet Take 1 tablet (15 mg total) by mouth daily, Starting Fri 11/19/2021, Normal      sertraline (ZOLOFT) 50 mg tablet Take 3 tablets (150 mg total) by mouth daily, Starting Tue 10/2/2018, Print           No discharge procedures on file  Prior to Admission Medications   Prescriptions Last Dose Informant Patient Reported? Taking? cholecalciferol 1000 units tablet   No No   Sig: Take 1 tablet (1,000 Units total) by mouth daily Start after finishing Vitamin D2 55959R   Patient not taking: Reported on 8/10/2021   cyanocobalamin 1,000 mcg/mL   No No   Sig: Inject 1 mL (1,000 mcg total) into a muscle every 30 (thirty) days   Patient not taking: Reported on 8/10/2021   doxepin (SINEquan) 75 MG capsule   Yes No   Sig: Take 75 mg by mouth daily at bedtime   insulin glargine (LANTUS) 100 units/mL subcutaneous injection   No No   Sig: Inject 18 Units under the skin daily at bedtime   Patient taking differently: Inject 20 Units under the skin daily at bedtime    lisinopril-hydrochlorothiazide (PRINZIDE,ZESTORETIC) 10-12 5 MG per tablet   Yes No   Sig: Take 1 tablet by mouth daily   loratadine (CLARITIN) 10 mg tablet   Yes No   Sig: Take 10 mg by mouth daily   Patient not taking: Reported on 11/19/2021    meloxicam (Mobic) 15 mg tablet   No No   Sig: Take 1 tablet (15 mg total) by mouth daily   sertraline (ZOLOFT) 50 mg tablet   No No   Sig: Take 3 tablets (150 mg total) by mouth daily   Patient not taking: Reported on 11/19/2021       Facility-Administered Medications Last Administration Doses Remaining   dexamethasone (DECADRON) injection 10 mg None recorded 1          Portions of the record may have been created with voice recognition software   Occasional wrong word or "sound a like" substitutions may have occurred due to the inherent limitations of voice recognition software  Read the chart carefully and recognize, using context, where substitutions have occurred      Electronically signed by:  Cheryle Areas, MD Diana Havens, MD  02/27/22 8594

## 2022-02-27 NOTE — CODE DOCUMENTATION
CPR stated by EMS prior to arrival  Approximate code start 20 minutes prior to arrival  Pulse check at this time  No pulse  CPR restarted

## 2022-02-27 NOTE — ED NOTES
Called and left message for medical records regarding patients expiration     Brenda Hill RN  02/27/22 2934

## 2022-02-27 NOTE — ED NOTES
[facility] called at this time  Krik Mauro RN spoke to [place]   Patient eligible for donation     Saloni Badillo RN  02/27/22 300 [facility] Veronica Multani RN  02/27/22 3021

## 2022-02-27 NOTE — CODE DOCUMENTATION
Lab calling Charge LAWRENCE Tesfaye stating that labs were of questionable quality  Provider made aware

## 2022-02-28 NOTE — ED NOTES
Spoke with Trudy Hernandez from [facility] on 22 @ 8815  Trudy Hernandez asked this RN to be a witness to the pt's brother consenting to tissue donation from the  patient  Pt's brother Molly Tre consented to tissue donation via recorded call with Frogdice and myself       Len Childers, LAWRENCE  [phone]

## 2022-03-28 NOTE — PROGRESS NOTES
Daily Note     Today's date: 2020  Patient name: Keneth Spurling  : 1960  MRN: [id number]  Referring provider: Hannah Ghosh MD  Dx:   Encounter Diagnosis     ICD-10-CM    1  Arthralgia of right shoulder region M25 511               Subjective: The e-stim helped with the pain last visit  I still have pain in my sh but it has been better since the injections  Objective: See treatment diary below    Assessment: Tolerated treatment well  Pt reports some soreness t/o TE but able to complete and also add S/L ER  Pt  Reports overall reduction pain levels and improved ROM since injections  Patient would benefit from continued PT    Plan: Continue per plan of care        Precautions: None  Re-Eval DUE: 20  Specialty Daily Treatment Diary      Manual  19   PROM - all planes - R shdr 10 minutes 10' 10 minutes 10 minutes 10 minutes         Exercise Diary  19   UBE Alt 10 minutes Alt 10' Alt 10 minutes Alt 10 minutes Alt 10 minutes   Pulleys - flex/abd 1 x 30 each 1 x 30 each 1 x 30 each 1 x 30 each 1 x 30 each   Finger Ladder - flex/abd D/C DC 1 x 3 each 1 x 3 each 1 x 3 each  D/C NV   MTP/LTP Black 2 x 10 each Black 2 x 10 each Blue 2 x 10 each Blue 2 x 10 each Blue  2 x 10 each Black NV   Tband - ER/IR Blue 2 x 10 each Blue 1 x 15 ea Blue 2 x 10 ea Blue 2 x 10 each Blue  2 x 10 ea   Tband punch Black 1 x 20  Black 1 x 15 Blue 1 x 15  Blue 1 x 15 Blue  1 x 15  Black NV   Shrugs/Rolls/ Retractions 1 x 10 each  3# 3#  1 x 20 each 1 x 10 each 1 x 10 each 1 x 10 each  Weight NV   Wall Pushups 1 x 15 1 x 15 1 x 15 1 x 15 1 x 15   Wall Slides - flex/abd 1 x 15 each 1 x 15 each 1 x 15 each  1 x 15 each 1 x 15 each   Isometrics - all planes 5" x 5 ea 5" x 5 each 5" x 5 each 5" 1 x 5 each  5" x 5 ea   Stand - FF and Abd  x10 ea side   x 10 ea      NV   Table Slides - flex/abd             Corner Stretch             Supine Cane TE - Patient is a 34yo m with history of type 1 DM, polysubstance abuse (heroin, cocaine, MJ), CKD IIIb, presenting due to N/V and abdominal pain. Patient states over the past 24hrs, he has been having multiple bouts of nonbloody, nonbillious N/V with abdominal pain. Denies any fever or chills. Admits not very compliant with his insulin regimen. He uses heroin (snorting), and smokes cigarette and MJ on a regular basis. Last use of heroin was yesterday.   He is found to be in mild DKA and is being admitted for management    - Marielle Preston MD   flex/abd/ER Flex/abd/ER  1 x 10 each  3# Flex/Abd/ER  1 x 10 each  3# Flex/abd/ER  1 x 10 ea 2# Flex/Abd 1 x 10 each 2# Flex/Abd/ 1 x 10 each 2#  3# NV   S/L ER and Abd    1 x 15      NV   Prone - flex/ext/rows         NV   Prone - T & V's             Ball Roll on Wall-up/down/L/R/cw/ccw         NV         Modalities 1/14/19 1-16-20 12/16/19 1/7/20 1/9/20   Estim/IFC with HP/CP HP/e-stim  15 minutes IFC/HP     15' HP 10 minutes CP only   10 minutes HP 10 minutes    Ultrasound - R trap/shoulder D/C DC DC D/C D/C

## 2022-06-14 NOTE — TREATMENT PLAN
TREATMENT PLAN REVIEW - [facility] 65 [facility]  1960 male MRN: [id number]    [address] [facility] Room / Bed: Naveen Mckeon [employer] 469-30 Encounter: [id number]          Admit Date/Time:  9/18/2018  5:56 PM    Treatment Team: Attending Provider: Vikki Frank DO; Consulting Physician: Sushila Ambrose MD; Patient Care Assistant: Xochitl Li; Patient Care Assistant: Collin Reyes; Patient Care Assistant: Samra Real; Recreational Therapist: Sapphire Olson; Physician Assistant: Delcie Curling, PA-C; Registered Nurse:  Meryle Oram, RN; Patient Care Assistant: Briana Appiah    Diagnosis: Principal Problem:    Current severe episode of major depressive disorder without psychotic features without prior episode [facility])  Active Problems:    PTSD (post-traumatic stress disorder)    Anxiety      Patient Strengths/Assets: ability for insight, capable of independent living, cooperative, compliant with medication, family ties, motivated, patient is on a voluntary commitment, Jew affiliation, supportive family/friends, work skills    Patient Barriers/Limitations: difficulty adapting, financial instability, lack of financial means, poor physical health    Short Term Goals: decrease in depressive symptoms, decrease in anxiety symptoms, decrease in suicidal thoughts, improvement in insight, improvement in reasoning ability    Long Term Goals: improvement in depression, improvement in anxiety, free of suicidal thoughts, improvement in reasoning ability, improved insight, acceptance of need for psychiatric medications, acceptance of need for psychiatric treatment, acceptance of need for psychiatric follow up after discharge    Progress Towards Goals: starting psychiatric medications as prescribed    Recommended Treatment: medication management, patient medication education, group therapy, milieu therapy, continued Behavioral Health psychiatric evaluation/assessment process    Treatment Frequency: daily medication monitoring, group and milieu therapy daily, monitoring through interdisciplinary rounds, monitoring through weekly patient care conferences    Expected Discharge Date:  6 days    Discharge Plan: discharge to home, referral to [address] [place] [address] Team for close psychiatric monitoring, referral to Extended Acute Care unit for a long term psychiatric treatment, referrals as indicated    Treatment Plan Created/Updated By: Rachelle Edge III, DO Cimetidine Counseling:  I discussed with the patient the risks of Cimetidine including but not limited to gynecomastia, headache, diarrhea, nausea, drowsiness, arrhythmias, pancreatitis, skin rashes, psychosis, bone marrow suppression and kidney toxicity.

## 2022-10-19 NOTE — PROGRESS NOTES
Daily Note     Today's date: 2019  Patient name: Wally Flannery  : 1960  MRN: [id number]  Referring provider: Calista Victor MD  Dx:   Encounter Diagnosis     ICD-10-CM    1  Arthralgia of right shoulder region M25 511               Subjective: Pt states " it still hurts "       Objective: See treatment diary below      Assessment: Tolerated treatment fairly well  A few verbal cues needed for correct form with TE  Pain noted R shoulder t/o TE  ROM deficits noted with manual therapy and pain at end ranges all directions  Patient would benefit from continued PT      Plan: Continue per plan of care        Precautions: None  Re-Eval DUE: 19  Specialty Daily Treatment Diary      Manual  11/4/19 11-8-19 11/12/19 10/28/19 10-31-19   PROM - all planes - R shdr 10 minutes 10' 10 min 10 minutes 10'         Exercise Diary  11/4/19 11-8-19 11/12/19 10/28/19 10-31-19   UBE Alt 10 minutes Alt 10' Alt 10' Alt 10 minutes Alt 10'   Pulleys - flex/abd 1 x 30 each 1 x 30 each 1 x 30 ea 1 x 30 each 1 x 30 each   Finger Ladder - flex/abd 1 x 3 each 1 x 3 each 1 x 3 ea  1 x 3 each 1 x 3 each   Pendulums - all directions D/C D/C  DC  D/C DC   MTP/LTP Blue 2 x 10 each Blue 2 x 10 each Red 2 x 10 ea Red 1 x 10 each Red 2 x 10 each   Tband - ER/IR Blue 1 x 10 each Blue 1 x 15 ea Red 12x 10 ea Red 1 x 10 each  Red 1 x 10 ea   Tband punch Blue 1 x 10  Blue 1 x 15 Blue 1 x 15   Red 1 x 10   Shrugs/Rolls/ Retractions 1 x 10 ea 1 x 10 each 1 x 10 ea  1 x 10 each 1 x 10 each   Wall Pushups 1 x 10 1 x 15 1 x 15 1 x 10 1 x 10    Wall Slides - flex/abd 1 x 10 ea 1 x 15 each 1 x 15 ea  1 x 10 each 1 x 10 each   Isometrics - all planes 5" x 5 ea 5" x 5 each 5" x 5 ea 5" 1 x 5 each  5" x 5 ea   Stand - FF and Abd             Table Slides - flex/abd             Corner Stretch             Supine Cane TE - flex/abd/ER Flex/abd/ER  1 x 10 each  2# Flex/Abd/ER  1 x 10 each  2# Flex/abd/ER  1 x 10 ea 2# Flex/Abd 1 x 10 each Flex/Abd/ 1 x 10 each   Supine Serratus Punches D/C DC DC 1 x 10  DC 2* added tband punch    S/L ER and Abd             Prone - flex/ext/rows/ horiz abd             Prone - T & V's             Ball Roll on Wall-up/down/L/R/cw/ccw                   Modalities 11/4/19 11-8-19 11/12/19 10/28/19 10-31-19   Estim/IFC with HP/CP HP only  10 minutes HP 10 minutes HP 10 min HP only   10 minutes HP 10'   Ultrasound - R trap/shoulder D/C DC DC D/C D/C               None